# Patient Record
Sex: MALE | Race: BLACK OR AFRICAN AMERICAN | Employment: OTHER | URBAN - METROPOLITAN AREA
[De-identification: names, ages, dates, MRNs, and addresses within clinical notes are randomized per-mention and may not be internally consistent; named-entity substitution may affect disease eponyms.]

---

## 2017-03-22 ENCOUNTER — HOSPITAL ENCOUNTER (OUTPATIENT)
Dept: LAB | Age: 72
Discharge: HOME | End: 2017-03-22

## 2017-03-22 ENCOUNTER — OFFICE VISIT (OUTPATIENT)
Dept: CARDIOLOGY CLINIC | Age: 72
End: 2017-03-22

## 2017-03-22 VITALS
DIASTOLIC BLOOD PRESSURE: 74 MMHG | BODY MASS INDEX: 23.37 KG/M2 | OXYGEN SATURATION: 99 % | HEIGHT: 66 IN | WEIGHT: 145.4 LBS | HEART RATE: 68 BPM | SYSTOLIC BLOOD PRESSURE: 109 MMHG

## 2017-03-22 DIAGNOSIS — I10 ESSENTIAL HYPERTENSION, BENIGN: Primary | ICD-10-CM

## 2017-03-22 DIAGNOSIS — I25.10 ATHEROSCLEROSIS OF NATIVE CORONARY ARTERY OF NATIVE HEART WITHOUT ANGINA PECTORIS: ICD-10-CM

## 2017-03-22 DIAGNOSIS — Z98.61 S/P PTCA (PERCUTANEOUS TRANSLUMINAL CORONARY ANGIOPLASTY): ICD-10-CM

## 2017-03-22 DIAGNOSIS — E78.5 DYSLIPIDEMIA: ICD-10-CM

## 2017-03-22 DIAGNOSIS — M15.9 PRIMARY OSTEOARTHRITIS INVOLVING MULTIPLE JOINTS: ICD-10-CM

## 2017-03-22 PROCEDURE — 99001 SPECIMEN HANDLING PT-LAB: CPT | Performed by: PHYSICIAN ASSISTANT

## 2017-03-22 NOTE — PROGRESS NOTES
[facility]   [address]                                          NEW PATIENT HPI/FOLLOW-UP      NAME:  Carolina Pickard   :   1945   MRN:   [id number]   PCP:  Isela Duque MD           Subjective: The patient is a 66y.o. year old male  who returns for a routine follow-up. Since the last visit, patient reports no new symptoms. Denies change in exercise tolerance, chest pain, edema, medication intolerance, palpitations, shortness of breath, PND/orthopnea wheezing, sputum, syncope, dizziness or light headedness. Doing satisfactorily. Review of Systems  General: Pt denies excessive weight gain or loss. Pt is able to conduct ADL's. Respiratory: Denies shortness of breath, ÁLVAREZ, wheezing or stridor.   Cardiovascular: Denies precordial pain, palpitations, edema or PND  Gastrointestinal: Denies poor appetite, indigestion, abdominal pain or blood in stool  Peripheral vascular: Denies claudication, leg cramps  Neuropsychiatric: Denies paresthesias,tingling,numbness,anxiety,depression,fatigue  Musculoskeletal: Denies pain,tenderness, soreness,swelling      Past Medical History:   Diagnosis Date    CAD (coronary artery disease)     Chest pain, unspecified     Chest pain, unspecified     Chest pain, unspecified     Coronary atherosclerosis of native coronary artery     Coronary atherosclerosis of native coronary artery     Essential hypertension     Essential hypertension, benign     Mixed hyperlipidemia     Other ill-defined conditions(799.89)     HIGH CHOLESTEROL    Other malaise and fatigue     Pure hypercholesterolemia      Patient Active Problem List    Diagnosis Date Noted    Drug therapy 2015    Fatigue 2015    Dizziness 2014    Encephalopathy, unspecified 2013    Intractable pain 2013    Right lumbar radiculopathy 2013    S/P PTCA (percutaneous transluminal coronary angioplasty) 08/03/2013    Chest pain, unspecified 03/05/2013    Essential hypertension, benign 03/05/2013    Dyslipidemia 03/05/2013    Esophageal reflux 03/05/2013    DJD (degenerative joint disease) 07/17/2012    Postsurgical percutaneous transluminal coronary angioplasty status 07/17/2012    Coronary atherosclerosis of native coronary artery 07/17/2012    Carotid sinus syndrome 07/17/2012      Past Surgical History:   Procedure Laterality Date    HX BACK SURGERY      THORACIC SPINE SURGERY  X2    HX GI      COLONOSCOPY    HX HEART CATHETERIZATION      STENT (DR Pebbles Nuñez)    HX LUMBAR LAMINECTOMY  8/2013    HX PTCA       Allergies   Allergen Reactions    Statins-Hmg-Coa Reductase Inhibitors Other (comments)     Body aches      Family History   Problem Relation Age of Onset    Cancer Mother      OVARIAN    Cancer Father      PROSTATE    Anesth Problems Neg Hx       Social History     Social History    Marital status:      Spouse name: N/A    Number of children: N/A    Years of education: N/A     Occupational History    Not on file. Social History Main Topics    Smoking status: Never Smoker    Smokeless tobacco: Never Used    Alcohol use No    Drug use: No    Sexual activity: Yes     Partners: Female     Other Topics Concern    Not on file     Social History Narrative      Current Outpatient Prescriptions   Medication Sig    DOCUSATE CALCIUM (STOOL SOFTENER PO) Take 100 mg by mouth as needed.  lisinopril-hydrochlorothiazide (PRINZIDE, ZESTORETIC) 20-12.5 mg per tablet TAKE ONE TABLET BY MOUTH ONCE DAILY    MULTIVITAMIN (MULTIPLE VITAMIN PO) Take  by mouth daily.  cholestyramine-sucrose (QUESTRAN) 4 gram powder Take 4 g by mouth two (2) times daily (with meals).  red yeast rice extract 600 mg cap Take 600 mg by mouth daily.  aspirin delayed-release 81 mg tablet Take 81 mg by mouth daily.  ibuprofen (MOTRIN) 200 mg tablet Take  by mouth daily as needed.     flaxseed 1,000 mg cap Take 1 Cap by mouth daily.  omega-3 fatty acids-vitamin e (FISH OIL) 1,000 mg Cap Take 1 Cap by mouth as needed. No current facility-administered medications for this visit. I have reviewed the MAs notes, vitals, problem list, allergy list, medical history, family medical, social history and medications. Objective:     Physical Exam:     Vitals:    03/22/17 0928   BP: 109/74   Pulse: 68   SpO2: 99%   Weight: 145 lb 6.4 oz (66 kg)   Height: 5' 6\" (1.676 m)    Body mass index is 23.47 kg/(m^2). General: WDWN elderly male, in no acute distress. Pleasant affect. HEENT: No carotid bruits, no JVD, trach is midline. Heart:  Normal S1/S2 negative S3 or S4. Regular, no murmur, gallop or rub.   Respiratory: Clear bilaterally, no wheezing or rales  Abdomen:   Soft, non-tender, bowel sounds are active.   Extremities:  No edema, normal cap refill, no cyanosis. Neuro: A&Ox3, speech clear, gait stable. Skin: Skin color is normal. No rashes or lesions. No diaphoresis. Vascular: 2+ pulses symmetric in all extremities      Data Review:       Cardiographics:    EKG: NSR, old anterior infarct, early repolarization    Cardiology Labs:    Results for orders placed or performed during the hospital encounter of 08/03/13   EKG, 12 LEAD, INITIAL   Result Value Ref Range    Ventricular Rate 102 BPM    Atrial Rate 102 BPM    P-R Interval 150 ms    QRS Duration 84 ms    Q-T Interval 336 ms    QTC Calculation (Bezet) 437 ms    Calculated P Axis 64 degrees    Calculated R Axis -7 degrees    Calculated T Axis 53 degrees    Diagnosis       Sinus tachycardia  When compared with ECG of 04-AUG-2013 23:45,  Vent.  rate has increased BY  44 BPM  Nonspecific T wave abnormality no longer evident in Inferior leads  Confirmed by Coletta Krabbe, M.D., [facility] ([id number]) on 8/9/2013 12:47:22 PM       Lab Results   Component Value Date/Time    Cholesterol, total 181 07/21/2016 09:23 AM    HDL Cholesterol 45 07/21/2016 09:23 AM    LDL, calculated 114 07/21/2016 09:23 AM    Triglyceride 109 07/21/2016 09:23 AM    CHOL/HDL Ratio 3.2 08/07/2013 04:24 AM       Lab Results   Component Value Date/Time    Sodium 144 03/07/2016 12:00 AM    Potassium 4.0 03/07/2016 12:00 AM    Chloride 104 03/07/2016 12:00 AM    CO2 23 03/07/2016 12:00 AM    Anion gap 7 01/10/2014 05:17 PM    Glucose 99 03/07/2016 12:00 AM    BUN 14 03/07/2016 12:00 AM    Creatinine 1.15 03/07/2016 12:00 AM    BUN/Creatinine ratio 12 03/07/2016 12:00 AM    GFR est AA 73 03/07/2016 12:00 AM    GFR est non-AA 63 03/07/2016 12:00 AM    Calcium 9.2 03/07/2016 12:00 AM    Bilirubin, total 0.4 07/21/2016 09:23 AM    AST (SGOT) 15 07/21/2016 09:23 AM    Alk. phosphatase 58 07/21/2016 09:23 AM    Protein, total 6.6 07/21/2016 09:23 AM    Albumin 4.2 07/21/2016 09:23 AM    Globulin 3.5 01/10/2014 05:17 PM    A-G Ratio 1.9 03/07/2016 12:00 AM    ALT (SGPT) 12 07/21/2016 09:23 AM          Assessment:       ICD-10-CM ICD-9-CM    1. Essential hypertension, benign I10 401.1 AMB POC EKG ROUTINE W/ 12 LEADS, INTER & REP      LIPID PANEL      METABOLIC PANEL, COMPREHENSIVE   2. Chest pain, unspecified R07.9 786.50 AMB POC EKG ROUTINE W/ 12 LEADS, INTER & REP      LIPID PANEL      METABOLIC PANEL, COMPREHENSIVE   3. Atherosclerosis of native coronary artery of native heart with angina pectoris ([facility] [address] I25.119 414.01 LIPID PANEL     650.2 METABOLIC PANEL, COMPREHENSIVE   4. S/P PTCA (percutaneous transluminal coronary angioplasty) Z98.61 V45.82 LIPID PANEL      METABOLIC PANEL, COMPREHENSIVE         Discussion: Patient presents at this time stable from a cardiac perspective. Pleased with present status. Plan: 1. Continue same meds. Lipid profile and labs followed by this office. -- Repeat Lipids and CMP   -- Will follow up any test results by phone and/or f/u here in office if needed. Pal Kaiser       2.Encouraged to exercise to tolerance,  and follow low fat, low cholesterol, low sodium predominantly Plant-based (consider Mediterranean) diet. 3.Follow up: 6 months   -- Call with questions or concerns. I have discussed the diagnosis with the patient and the intended plan as seen in the above orders. The patient has received an after-visit summary and questions were answered concerning future plans. I have discussed any concerning medication side effects and warnings with the patient as well. Patient seen and examined. All pertinent data reviewed. I have reviewed detailed note as outlined by Irwin Henry. Case discussed with Nursing/medical assistant staff and Irwin Henry. Patient cardiac stable. Plans as outlined.     Carlota Valentin PA-C  3/22/2017     LISANDRA Blackwell

## 2017-03-22 NOTE — MR AVS SNAPSHOT
Visit Information Date & Time Provider Department Dept. Phone Encounter #  
 3/22/2017  9:30 AM Julio Pittman MD [facility] [address] [address] [id number] Upcoming Health Maintenance Date Due DTaP/Tdap/Td series (1 - Tdap) 10/4/1964 FOBT Q 1 YEAR AGE 50-75 10/4/1993 ZOSTER VACCINE AGE 60> 10/4/2003 GLAUCOMA SCREENING Q2Y 10/4/2008 MEDICARE YEARLY EXAM 10/4/2008 Pneumococcal 65+ Low/Medium Risk (2 of 2 - PCV13) 8/13/2014 INFLUENZA AGE 9 TO ADULT 8/1/2016 Allergies as of 3/22/2017  Review Complete On: 3/22/2017 By: Chanel Hernandez Severity Noted Reaction Type Reactions Statins-hmg-coa Reductase Inhibitors  07/17/2012    Other (comments) Body aches Current Immunizations  Reviewed on 8/12/2013 Name Date Pneumococcal Polysaccharide (PPSV-23) 8/13/2013  1:04 PM  
  
 Not reviewed this visit You Were Diagnosed With   
  
 Codes Comments Essential hypertension, benign    -  Primary ICD-10-CM: I10 
ICD-9-CM: 251. 1 Chest pain, unspecified     ICD-10-CM: R07.9 ICD-9-CM: 786.50 Atherosclerosis of native coronary artery of native heart with angina pectoris ([facility] [address]     ICD-10-CM: I25.119 ICD-9-CM: 414.01, 413.9 S/P PTCA (percutaneous transluminal coronary angioplasty)     ICD-10-CM: Z98.61 ICD-9-CM: V45.82 Vitals BP Pulse Height(growth percentile) Weight(growth percentile) SpO2 BMI  
 109/74 68 5' 6\" (1.676 m) 145 lb 6.4 oz (66 kg) 99% 23.47 kg/m2 Smoking Status Never Smoker Vitals History BMI and BSA Data Body Mass Index Body Surface Area  
 23.47 kg/m 2 1.75 m 2 Preferred Pharmacy Pharmacy Name Phone [facility] [address] [phone] Your Updated Medication List  
  
   
This list is accurate as of: 3/22/17 10:09 AM.  Always use your most recent med list.  
  
  
  
  
 aspirin delayed-release 81 mg tablet Take 81 mg by mouth daily. cholestyramine-sucrose 4 gram powder Commonly known as:  Reese Form Take 4 g by mouth two (2) times daily (with meals). FISH OIL 1,000 mg Cap Generic drug:  omega-3 fatty acids-vitamin e Take 1 Cap by mouth as needed. flaxseed 1,000 mg Cap Take 1 Cap by mouth daily. ibuprofen 200 mg tablet Commonly known as:  MOTRIN Take  by mouth daily as needed. lisinopril-hydroCHLOROthiazide 20-12.5 mg per tablet Commonly known as:  PRINZIDE, ZESTORETIC  
TAKE ONE TABLET BY MOUTH ONCE DAILY MULTIPLE VITAMIN PO Take  by mouth daily. red yeast rice extract 600 mg Cap Take 600 mg by mouth daily. STOOL SOFTENER PO Take 100 mg by mouth as needed. We Performed the Following AMB POC EKG ROUTINE W/ 12 LEADS, INTER & REP [69025 CPT(R)] LIPID PANEL [90792 CPT(R)] METABOLIC PANEL, COMPREHENSIVE [51296 CPT(R)] Introducing [facility]! [facility] introduces 7-bites patient portal. Now you can access parts of your medical record, email your doctor's office, and request medication refills online. 1. In your internet browser, go to [email or web address] 2. Click on the First Time User? Click Here link in the Sign In box. You will see the New Member Sign Up page. 3. Enter your 7-bites Access Code exactly as it appears below. You will not need to use this code after youve completed the sign-up process. If you do not sign up before the expiration date, you must request a new code. · 7-bites Access Code: [email or web address] Expires: 6/20/2017  9:52 AM 
 
4. Enter the last four digits of your Social Security Number (xxxx) and Date of Birth (mm/dd/yyyy) as indicated and click Submit. You will be taken to the next sign-up page. 5. Create a 7-bites ID.  This will be your 7-bites login ID and cannot be changed, so think of one that is secure and easy to remember. 6. Create a Socialare password. You can change your password at any time. 7. Enter your Password Reset Question and Answer. This can be used at a later time if you forget your password. 8. Enter your e-mail address. You will receive e-mail notification when new information is available in [address] 9. Click Sign Up. You can now view and download portions of your medical record. 10. Click the Download Summary menu link to download a portable copy of your medical information. If you have questions, please visit the Frequently Asked Questions section of the Socialare website. Remember, Socialare is NOT to be used for urgent needs. For medical emergencies, dial 911. Now available from your iPhone and Android! Please provide this summary of care documentation to your next provider. Your primary care clinician is listed as Elvis Gillette. If you have any questions after today's visit, please call [phone].

## 2017-03-23 LAB
ALBUMIN SERPL-MCNC: 4.6 G/DL (ref 3.5–4.8)
ALBUMIN/GLOB SERPL: 1.7 {RATIO} (ref 1.2–2.2)
ALP SERPL-CCNC: 61 IU/L (ref 39–117)
ALT SERPL-CCNC: 23 IU/L (ref 0–44)
AST SERPL-CCNC: 30 IU/L (ref 0–40)
BILIRUB SERPL-MCNC: 0.6 MG/DL (ref 0–1.2)
BUN SERPL-MCNC: 13 MG/DL (ref 8–27)
BUN/CREAT SERPL: 11 (ref 10–22)
CALCIUM SERPL-MCNC: 9.8 MG/DL (ref 8.6–10.2)
CHLORIDE SERPL-SCNC: 99 MMOL/L (ref 96–106)
CHOLEST SERPL-MCNC: 233 MG/DL (ref 100–199)
CO2 SERPL-SCNC: 25 MMOL/L (ref 18–29)
CREAT SERPL-MCNC: 1.2 MG/DL (ref 0.76–1.27)
GLOBULIN SER CALC-MCNC: 2.7 G/DL (ref 1.5–4.5)
GLUCOSE SERPL-MCNC: 89 MG/DL (ref 65–99)
HDLC SERPL-MCNC: 69 MG/DL
LDLC SERPL CALC-MCNC: 149 MG/DL (ref 0–99)
POTASSIUM SERPL-SCNC: 4.2 MMOL/L (ref 3.5–5.2)
PROT SERPL-MCNC: 7.3 G/DL (ref 6–8.5)
SODIUM SERPL-SCNC: 142 MMOL/L (ref 134–144)
TRIGL SERPL-MCNC: 74 MG/DL (ref 0–149)
VLDLC SERPL CALC-MCNC: 15 MG/DL (ref 5–40)

## 2017-03-28 DIAGNOSIS — E78.5 DYSLIPIDEMIA: Primary | ICD-10-CM

## 2017-03-28 RX ORDER — FENOFIBRATE 48 MG/1
48 TABLET, COATED ORAL DAILY
Qty: 30 TAB | Refills: 3 | Status: SHIPPED | OUTPATIENT
Start: 2017-03-28 | End: 2017-12-26 | Stop reason: SDUPTHER

## 2017-04-04 DIAGNOSIS — E78.5 DYSLIPIDEMIA: ICD-10-CM

## 2017-04-04 RX ORDER — CHOLESTYRAMINE 4 G/9G
4 POWDER, FOR SUSPENSION ORAL 2 TIMES DAILY WITH MEALS
Qty: 30 G | Refills: 11 | Status: SHIPPED | OUTPATIENT
Start: 2017-04-04 | End: 2018-03-20 | Stop reason: SDUPTHER

## 2017-04-19 ENCOUNTER — TELEPHONE (OUTPATIENT)
Dept: CARDIOLOGY CLINIC | Age: 72
End: 2017-04-19

## 2017-04-19 RX ORDER — LISINOPRIL AND HYDROCHLOROTHIAZIDE 12.5; 2 MG/1; MG/1
TABLET ORAL
Qty: 90 TAB | Refills: 1 | Status: SHIPPED | OUTPATIENT
Start: 2017-04-19 | End: 2017-12-26 | Stop reason: SDUPTHER

## 2017-04-19 NOTE — TELEPHONE ENCOUNTER
Outgoing call to patient to confirm that he's currently taking Triicor 45 mg daily per. Dr. Danny Lagunas. Patient stated \"that he's currently taking medication.

## 2017-09-20 ENCOUNTER — OFFICE VISIT (OUTPATIENT)
Dept: CARDIOLOGY CLINIC | Age: 72
End: 2017-09-20

## 2017-09-20 VITALS
HEIGHT: 66 IN | BODY MASS INDEX: 23.43 KG/M2 | WEIGHT: 145.8 LBS | HEART RATE: 59 BPM | DIASTOLIC BLOOD PRESSURE: 70 MMHG | OXYGEN SATURATION: 95 % | SYSTOLIC BLOOD PRESSURE: 108 MMHG

## 2017-09-20 DIAGNOSIS — E78.5 DYSLIPIDEMIA: ICD-10-CM

## 2017-09-20 DIAGNOSIS — I25.10 ATHEROSCLEROSIS OF NATIVE CORONARY ARTERY OF NATIVE HEART WITHOUT ANGINA PECTORIS: ICD-10-CM

## 2017-09-20 DIAGNOSIS — I10 ESSENTIAL HYPERTENSION, BENIGN: Primary | ICD-10-CM

## 2017-09-20 DIAGNOSIS — Z98.61 S/P PTCA (PERCUTANEOUS TRANSLUMINAL CORONARY ANGIOPLASTY): ICD-10-CM

## 2017-09-20 DIAGNOSIS — R07.9 CHEST PAIN, UNSPECIFIED: ICD-10-CM

## 2017-09-20 RX ORDER — CHOLESTYRAMINE 4 G/9G
POWDER, FOR SUSPENSION ORAL
COMMUNITY
Start: 2017-07-24 | End: 2017-09-20 | Stop reason: SDUPTHER

## 2017-09-20 NOTE — MR AVS SNAPSHOT
Visit Information Date & Time Provider Department Dept. Phone Encounter #  
 9/20/2017 10:35 AM MD [facility] [address] [place] [address] [phone] [id number] Your Appointments 3/20/2018 10:20 AM  
ESTABLISHED PATIENT with MD [facility]) Appt Note: 6 MO. F/U  
 [address] [place]  
[phone] [address] Upcoming Health Maintenance Date Due DTaP/Tdap/Td series (1 - Tdap) 10/4/1964 FOBT Q 1 YEAR AGE 50-75 10/4/1993 ZOSTER VACCINE AGE 60> 8/4/2003 GLAUCOMA SCREENING Q2Y 10/4/2008 MEDICARE YEARLY EXAM 10/4/2008 Pneumococcal 65+ Low/Medium Risk (2 of 2 - PCV13) 8/13/2014 INFLUENZA AGE 9 TO ADULT 8/1/2017 Allergies as of 9/20/2017  Review Complete On: 3/22/2017 By: Grecia Schneider MD  
  
 Severity Noted Reaction Type Reactions Statins-hmg-coa Reductase Inhibitors  07/17/2012    Other (comments) Body aches Current Immunizations  Reviewed on 8/12/2013 Name Date Pneumococcal Polysaccharide (PPSV-23) 8/13/2013  1:04 PM  
  
 Not reviewed this visit You Were Diagnosed With   
  
 Codes Comments Essential hypertension, benign    -  Primary ICD-10-CM: I10 
ICD-9-CM: 563. 1 Chest pain, unspecified     ICD-10-CM: R07.9 ICD-9-CM: 786.50 Atherosclerosis of native coronary artery of native heart without angina pectoris     ICD-10-CM: I25.10 ICD-9-CM: 414.01 Dyslipidemia     ICD-10-CM: E78.5 ICD-9-CM: 272.4 S/P PTCA (percutaneous transluminal coronary angioplasty)     ICD-10-CM: Z98.61 ICD-9-CM: V45.82 Vitals BP Pulse Height(growth percentile) Weight(growth percentile) SpO2 BMI  
 108/70 (!) 59 5' 6\" (1.676 m) 145 lb 12.8 oz (66.1 kg) 95% 23.53 kg/m2 Smoking Status Never Smoker Vitals History BMI and BSA Data Body Mass Index Body Surface Area  
 23.53 kg/m 2 1.75 m 2 Preferred Pharmacy Pharmacy Name Phone [facility] [address] , [address] [place] [address] [phone] Your Updated Medication List  
  
   
This list is accurate as of: 9/20/17 12:28 PM.  Always use your most recent med list.  
  
  
  
  
 aspirin delayed-release 81 mg tablet Take 81 mg by mouth daily. cholestyramine-sucrose 4 gram powder Commonly known as:  Addis Teresa Take 4 g by mouth two (2) times daily (with meals). fenofibrate nanocrystallized 48 mg tablet Commonly known as:  [employer] Take 1 Tab by mouth daily. Indications: hyperlipidemia  
  
 flaxseed 1,000 mg Cap Take 1 Cap by mouth daily. ibuprofen 200 mg tablet Commonly known as:  MOTRIN Take  by mouth daily as needed. lisinopril-hydroCHLOROthiazide 20-12.5 mg per tablet Commonly known as:  PRINZIDE, ZESTORETIC  
TAKE ONE TABLET BY MOUTH ONCE DAILY MULTIPLE VITAMIN PO Take  by mouth daily. red yeast rice extract 600 mg Cap Take 600 mg by mouth daily. STOOL SOFTENER PO Take 100 mg by mouth as needed. We Performed the Following AMB POC EKG ROUTINE W/ 12 LEADS, INTER & REP [63847 CPT(R)] Introducing [facility]! Jessica Carroll introduces e[employer]ao[employer] patient portal. Now you can access parts of your medical record, email your doctor's office, and request medication refills online. 1. In your internet browser, go to [email or web address] 2. Click on the First Time User? Click Here link in the Sign In box. You will see the New Member Sign Up page. 3. Enter your eBaoTech Access Code exactly as it appears below. You will not need to use this code after youve completed the sign-up process. If you do not sign up before the expiration date, you must request a new code. · [employer]ao[employer] Access Code: [email or web address] Expires: 12/19/2017 12:28 PM 
 
4. Enter the last four digits of your Social Security Number (xxxx) and Date of Birth (mm/dd/yyyy) as indicated and click Submit. You will be taken to the next sign-up page. 5. Create a ioSemantics ID. This will be your ioSemantics login ID and cannot be changed, so think of one that is secure and easy to remember. 6. Create a ioSemantics password. You can change your password at any time. 7. Enter your Password Reset Question and Answer. This can be used at a later time if you forget your password. 8. Enter your e-mail address. You will receive e-mail notification when new information is available in [address] 9. Click Sign Up. You can now view and download portions of your medical record. 10. Click the Download Summary menu link to download a portable copy of your medical information. If you have questions, please visit the Frequently Asked Questions section of the ioSemantics website. Remember, ioSemantics is NOT to be used for urgent needs. For medical emergencies, dial 911. Now available from your iPhone and Android! Please provide this summary of care documentation to your next provider. Your primary care clinician is listed as Benoit Gross. If you have any questions after today's visit, please call [phone].

## 2017-09-20 NOTE — PROGRESS NOTES
[facility]   [address] [facility], [address] [place] [address]                                          NEW PATIENT HPI/FOLLOW-UP      NAME:  Laurann Harada.   :   1945   MRN:   [id number]   PCP:  Jene Cogan, MD           Subjective: The patient is a 66y.o. year old male h/o CAD, s/p angioplasty, carotid sinus syndrome, htn, dyslipidemia with statin intolerance, DJD, who returns for a routine follow-up. Since the last visit, patient reports no new symptoms. Denies change in exercise tolerance, chest pain, edema, medication intolerance, palpitations, shortness of breath, PND/orthopnea, wheezing, sputum, syncope, dizziness or light headedness. Doing satisfactorily. Review of Systems  General: Pt denies excessive weight gain or loss. Pt is able to conduct ADL's. Respiratory: Denies shortness of breath, ÁLVAREZ, wheezing or stridor.   Cardiovascular: Denies precordial pain, palpitations, edema or PND  Gastrointestinal: Denies poor appetite, indigestion, abdominal pain or blood in stool  Peripheral vascular: Denies claudication, leg cramps  Neuropsychiatric: Denies paresthesias,tingling,numbness,anxiety,depression,fatigue  Musculoskeletal: Denies pain,tenderness, soreness,swelling      Past Medical History:   Diagnosis Date    CAD (coronary artery disease)     Chest pain, unspecified     Chest pain, unspecified     Chest pain, unspecified     Coronary atherosclerosis of native coronary artery     Coronary atherosclerosis of native coronary artery     Essential hypertension     Essential hypertension, benign     Mixed hyperlipidemia     Other ill-defined conditions     HIGH CHOLESTEROL    Other malaise and fatigue     Pure hypercholesterolemia      Patient Active Problem List    Diagnosis Date Noted    Drug therapy 2015    Fatigue 2015    Dizziness 2014    Encephalopathy, unspecified 2013    Intractable pain 2013    Right lumbar radiculopathy 08/03/2013    S/P PTCA (percutaneous transluminal coronary angioplasty) 08/03/2013    Chest pain, unspecified 03/05/2013    Essential hypertension, benign 03/05/2013    Dyslipidemia 03/05/2013    Esophageal reflux 03/05/2013    DJD (degenerative joint disease) 07/17/2012    Postsurgical percutaneous transluminal coronary angioplasty status 07/17/2012    Coronary atherosclerosis of native coronary artery 07/17/2012    Carotid sinus syndrome 07/17/2012      Past Surgical History:   Procedure Laterality Date    HX BACK SURGERY      THORACIC SPINE SURGERY  X2    HX GI      COLONOSCOPY    HX HEART CATHETERIZATION      STENT (DR Alpa Hernandez)    HX LUMBAR LAMINECTOMY  8/2013    HX PTCA       Allergies   Allergen Reactions    Statins-Hmg-Coa Reductase Inhibitors Other (comments)     Body aches      Family History   Problem Relation Age of Onset    Cancer Mother      OVARIAN    Cancer Father      PROSTATE    Anesth Problems Neg Hx       Social History     Social History    Marital status:      Spouse name: N/A    Number of children: N/A    Years of education: N/A     Occupational History    Not on file. Social History Main Topics    Smoking status: Never Smoker    Smokeless tobacco: Never Used    Alcohol use No    Drug use: No    Sexual activity: Yes     Partners: Female     Other Topics Concern    Not on file     Social History Narrative      Current Outpatient Prescriptions   Medication Sig    lisinopril-hydroCHLOROthiazide (PRINZIDE, ZESTORETIC) 20-12.5 mg per tablet TAKE ONE TABLET BY MOUTH ONCE DAILY    cholestyramine-sucrose (QUESTRAN) 4 gram powder Take 4 g by mouth two (2) times daily (with meals).  fenofibrate nanocrystallized (TRICOR) 48 mg tablet Take 1 Tab by mouth daily. Indications: hyperlipidemia    DOCUSATE CALCIUM (STOOL SOFTENER PO) Take 100 mg by mouth as needed.  MULTIVITAMIN (MULTIPLE VITAMIN PO) Take  by mouth daily.     red yeast rice extract 600 mg cap Take 600 mg by mouth daily.  aspirin delayed-release 81 mg tablet Take 81 mg by mouth daily.  ibuprofen (MOTRIN) 200 mg tablet Take  by mouth daily as needed.  flaxseed 1,000 mg cap Take 1 Cap by mouth daily. No current facility-administered medications for this visit. I have reviewed the MAs notes, vitals, problem list, allergy list, medical history, family medical, social history and medications. Objective:     Physical Exam:     Vitals:    09/20/17 1041   BP: 108/70   Pulse: (!) 59   SpO2: 95%   Weight: 145 lb 12.8 oz (66.1 kg)   Height: 5' 6\" (1.676 m)    Body mass index is 23.53 kg/(m^2). General: WDWN adult male, in no acute distress. Pleasant affect. HEENT: No carotid bruits, no JVD, trach is midline. Heart:  Normal S1/S2 negative S3 or S4. Regular but slow, no murmur, gallop or rub.   Respiratory: Clear bilaterally, no wheezing or rales  Abdomen:   Soft, non-tender, bowel sounds are active.   Extremities:  No edema, normal cap refill, no cyanosis. Neuro: A&Ox3, speech clear, gait stable. Skin: Skin color is normal. No rashes or lesions. No diaphoresis. Vascular: 2+ pulses symmetric in all extremities      Data Review:       Cardiographics:    EKG: Sinus  Bradycardia. Normal DE. Normal axis. Inferolateral ST-elevation -possible repolarization variant. ST segment consistent with EKG 3/22/17    Cardiology Labs:    Results for orders placed or performed during the hospital encounter of 08/03/13   EKG, 12 LEAD, INITIAL   Result Value Ref Range    Ventricular Rate 102 BPM    Atrial Rate 102 BPM    P-R Interval 150 ms    QRS Duration 84 ms    Q-T Interval 336 ms    QTC Calculation (Bezet) 437 ms    Calculated P Axis 64 degrees    Calculated R Axis -7 degrees    Calculated T Axis 53 degrees    Diagnosis       Sinus tachycardia  When compared with ECG of 04-AUG-2013 23:45,  Vent.  rate has increased BY  44 BPM  Nonspecific T wave abnormality no longer evident in Inferior leads  Confirmed by Benji Thomas M.D., Stevo Pearson ([id number]) on 8/9/2013 12:47:22 PM       Lab Results   Component Value Date/Time    Cholesterol, total 233 03/22/2017 12:00 AM    HDL Cholesterol 69 03/22/2017 12:00 AM    LDL, calculated 149 03/22/2017 12:00 AM    Triglyceride 74 03/22/2017 12:00 AM    CHOL/HDL Ratio 3.2 08/07/2013 04:24 AM       Lab Results   Component Value Date/Time    Sodium 142 03/22/2017 12:00 AM    Potassium 4.2 03/22/2017 12:00 AM    Chloride 99 03/22/2017 12:00 AM    CO2 25 03/22/2017 12:00 AM    Anion gap 7 01/10/2014 05:17 PM    Glucose 89 03/22/2017 12:00 AM    BUN 13 03/22/2017 12:00 AM    Creatinine 1.20 03/22/2017 12:00 AM    BUN/Creatinine ratio 11 03/22/2017 12:00 AM    GFR est AA 69 03/22/2017 12:00 AM    GFR est non-AA 60 03/22/2017 12:00 AM    Calcium 9.8 03/22/2017 12:00 AM    Bilirubin, total 0.6 03/22/2017 12:00 AM    AST (SGOT) 30 03/22/2017 12:00 AM    Alk. phosphatase 61 03/22/2017 12:00 AM    Protein, total 7.3 03/22/2017 12:00 AM    Albumin 4.6 03/22/2017 12:00 AM    Globulin 3.5 01/10/2014 05:17 PM    A-G Ratio 1.7 03/22/2017 12:00 AM    ALT (SGPT) 23 03/22/2017 12:00 AM          Assessment:       ICD-10-CM ICD-9-CM    1. Essential hypertension, benign I10 401.1 AMB POC EKG ROUTINE W/ 12 LEADS, INTER & REP   2. Chest pain, unspecified R07.9 786.50 AMB POC EKG ROUTINE W/ 12 LEADS, INTER & REP   3. Atherosclerosis of native coronary artery of native heart without angina pectoris I25.10 414.01    4. Dyslipidemia E78.5 272.4    5. S/P PTCA (percutaneous transluminal coronary angioplasty) Z98.61 V45.82          Discussion: Patient presents at this time stable from a cardiac perspective. Lipids not at goal. Pleased with present status. Plan: 1. Continue same meds. Lipid profile and labs followed by this office. -- Redraw in 6 months    2. Encouraged to exercise to tolerance, lose weight, and follow low fat, low cholesterol, low sodium predominantly Plant-based (consider Mediterranean) diet. 3.Follow up: 6 months    I have discussed the diagnosis with the patient and the intended plan as seen in the above orders. The patient has received an after-visit summary and questions were answered concerning future plans. I have discussed any concerning medication side effects and warnings with the patient as well. Patient seen and examined. All pertinent data reviewed. I have reviewed detailed note as outlined by Royal Zuniga. Case discussed with Nursing/medical assistant staff and Royal Zuniga. Patient presents with lipids not at goal. Consider Ropatha injectable. Patient will \"think about it\" and call with decision but concerned about cost. Plans as outlined.       Elidia Thakur PA-C  9/20/2017     LISANDRA Page

## 2017-09-29 ENCOUNTER — OFFICE VISIT (OUTPATIENT)
Dept: INTERNAL MEDICINE CLINIC | Age: 72
End: 2017-09-29

## 2017-09-29 VITALS
SYSTOLIC BLOOD PRESSURE: 111 MMHG | HEART RATE: 71 BPM | OXYGEN SATURATION: 98 % | HEIGHT: 66 IN | WEIGHT: 146.2 LBS | DIASTOLIC BLOOD PRESSURE: 70 MMHG | TEMPERATURE: 98 F | BODY MASS INDEX: 23.5 KG/M2 | RESPIRATION RATE: 18 BRPM

## 2017-09-29 DIAGNOSIS — R79.9 ABNORMAL FINDING OF BLOOD CHEMISTRY: ICD-10-CM

## 2017-09-29 DIAGNOSIS — R31.1 BENIGN ESSENTIAL MICROSCOPIC HEMATURIA: ICD-10-CM

## 2017-09-29 DIAGNOSIS — Z12.11 SCREEN FOR COLON CANCER: ICD-10-CM

## 2017-09-29 DIAGNOSIS — Z98.61 S/P PTCA (PERCUTANEOUS TRANSLUMINAL CORONARY ANGIOPLASTY): ICD-10-CM

## 2017-09-29 DIAGNOSIS — I10 ESSENTIAL HYPERTENSION, BENIGN: Primary | ICD-10-CM

## 2017-09-29 DIAGNOSIS — Z98.61 POSTSURGICAL PERCUTANEOUS TRANSLUMINAL CORONARY ANGIOPLASTY STATUS: ICD-10-CM

## 2017-09-29 DIAGNOSIS — M15.9 PRIMARY OSTEOARTHRITIS INVOLVING MULTIPLE JOINTS: ICD-10-CM

## 2017-09-29 NOTE — PROGRESS NOTES
1. Have you been to the ER, urgent care clinic since your last visit? Hospitalized since your last visit? No    2. Have you seen or consulted any other health care providers outside of the [address] [place] [address] since your last visit? Include any pap smears or colon screening.  No    Wants to have colonoscopy

## 2017-09-29 NOTE — PROGRESS NOTES
[facility]  Roger Castillo MD, [address] [address]  Phone:  [phone]  Fax: [phone]    Chief Complaint   Patient presents with    Hypertension       SUBJECTIVE:    Osmin Vargas is a 66 y.o. male Patient returns today ambulatory, alert, has the capacity to give an accurate history. We recall that we last saw him when hospitalized under the services of Britt Johnson. He had been admitted on 08/03/13, discharged on 08/13/13 with intractable pain due to lumbar spinal stenosis and spondylosis and underwent a lumbar laminectomy with instrumentation by Dr. Meredith Berrios on 08/03/13. More recently he was seen by Sonali Ledezma last week with history of coronary artery disease, status post angioplasty,  syndrome, hypertension, dyslipidemia with statin intolerance, DJD. He was advised to continue his current medications. He was encouraged to exercise to tolerance, use a plant based or Mediterranean diet and see him again in six months. Patient received a letter from the gastroenterologist requesting a repeat colonoscopy. He comes in today for evaluation in preparation for that. Current Outpatient Prescriptions   Medication Sig Dispense Refill    lisinopril-hydroCHLOROthiazide (PRINZIDE, ZESTORETIC) 20-12.5 mg per tablet TAKE ONE TABLET BY MOUTH ONCE DAILY 90 Tab 1    cholestyramine-sucrose (QUESTRAN) 4 gram powder Take 4 g by mouth two (2) times daily (with meals). 30 g 11    fenofibrate nanocrystallized (TRICOR) 48 mg tablet Take 1 Tab by mouth daily. Indications: hyperlipidemia 30 Tab 3    DOCUSATE CALCIUM (STOOL SOFTENER PO) Take 100 mg by mouth as needed.  MULTIVITAMIN (MULTIPLE VITAMIN PO) Take  by mouth daily.  red yeast rice extract 600 mg cap Take 600 mg by mouth daily.  aspirin delayed-release 81 mg tablet Take 81 mg by mouth daily.  ibuprofen (MOTRIN) 200 mg tablet Take  by mouth daily as needed.  flaxseed 1,000 mg cap Take 1 Cap by mouth daily.        Past Medical History:   Diagnosis Date    CAD (coronary artery disease)     Chest pain, unspecified     Chest pain, unspecified     Chest pain, unspecified     Coronary atherosclerosis of native coronary artery 07/17/2012    stent    Coronary atherosclerosis of native coronary artery     Essential hypertension     Essential hypertension, benign     Lumbar spinal stenosis 08/03/2013    SPINE LUMBAR LAMINECTOMY WITH INSTRUMENTATION - p tamiko    Mixed hyperlipidemia     Other ill-defined conditions     HIGH CHOLESTEROL    Other malaise and fatigue     Pure hypercholesterolemia      Past Surgical History:   Procedure Laterality Date    HX BACK SURGERY      THORACIC SPINE SURGERY  X2    HX GI      COLONOSCOPY    HX HEART CATHETERIZATION      STENT (DR Froylan Angelucci)    HX LUMBAR LAMINECTOMY  8/2013    HX PTCA       Allergies   Allergen Reactions    Statins-Hmg-Coa Reductase Inhibitors Other (comments)     Body aches       REVIEW OF SYSTEMS:  General: negative for - chills or fever  ENT: negative for - headaches, nasal congestion or tinnitus  Respiratory: negative for - cough, hemoptysis, shortness of breath or wheezing  Cardiovascular : negative for - chest pain, edema, palpitations or shortness of breath  Gastrointestinal: negative for - abdominal pain, blood in stools, heartburn or nausea/vomiting  Genito-Urinary: no dysuria, trouble voiding, or hematuria  Musculoskeletal: negative for - gait disturbance, joint pain, joint stiffness or joint swelling  Neurological: no TIA or stroke symptoms  Hematologic: no bruises, no bleeding, no swollen glands  Integument: no lumps, mole changes, nail changes or rash  Endocrine:no malaise/lethargy or unexpected weight changes      Social History     Social History    Marital status:      Spouse name: N/A    Number of children: N/A    Years of education: N/A     Social History Main Topics    Smoking status: Never Smoker    Smokeless tobacco: Never Used    Alcohol use No    Drug use: No    Sexual activity: Yes     Partners: Female     Other Topics Concern    None     Social History Narrative    Medical History: BPHEDelevated PSACholesterolHypertensionMRI 2011 leftw bonnie convexity along the mid to low er lumbar spine    and a rightw bonnie convexity along the thoracolumbar junction. 2 the patient has multilevel degenerative disc disease. 3 there is moderate spinal    stenosis at L3-4 and L4-5 levels. In addition there is facet arthropathy and asymmetrical disc protrusions at L3-4 and L4-5 levels. There is a    narrow ing of the lateral recesses w ith the potential for extrinsic compression upon the origins of the L4 and the L5 nerve roots respectivelyCT    abdomen 2003 malrotation left kidney otherw ise normalCarotid sinus syndrome    Surgical History: colonoscopy by Josefina Morin M.D. [id number] quiles md [id number] S1 laminectomy 2013    Hospitalization/Major Diagnostic Procedure: prolonged syncop'e 06    Family History: Mother: , cancerFather: , unknow nSister(s): aliveBrother(s): aliveDaughter(s): aliveGrandmother:    , natural causesGrandfather: , natural causes1 sister(s) - healthy. 3 son(s) , 3 daughter(s) . Social History: Alcohol Use Patient does not use alcohol. Smoking Status Patient is a never smoker. Caffeine: per day, coffee, soda, tea. Marital Status: . Lives w ith: alone. Children: yes, children. 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      Family History   Problem Relation Age of Onset    Cancer Mother      OVARIAN    Cancer Father      PROSTATE    Anesth Problems Neg Hx        OBJECTIVE:     Visit Vitals    /70    Pulse 71    Temp 98 °F (36.7 °C) (Oral)    Resp 18    Ht 5' 6\" (1.676 m)    Wt 146 lb 3.2 oz (66.3 kg)    SpO2 98%    BMI 23.6 kg/m2 CONSTITUTIONAL: well , well nourished, appears age appropriate  EYES: perrla, eom intact  ENMT:moist mucous membranes, pharynx clear  NECK: supple. Thyroid normal  RESPIRATORY: Chest: clear bilaterally  CARDIOVASCULAR: Heart: regular rate and rhythm  GASTROINTESTINAL: Abdomen: soft, bowel sounds active  HEMATOLOGIC: no pathological lymph nodes palpated  MUSCULOSKELETAL: Extremities: no edema, pulse 1+   INTEGUMENT: No unusual rashes or suspicious skin lesions noted. Nails appear normal.  NEUROLOGIC: non-focal exam   MENTAL STATUS: alert and oriented, appropriate affect     No visits with results within 3 Month(s) from this visit. Latest known visit with results is:    Office Visit on 03/22/2017   Component Date Value Ref Range Status    Cholesterol, total 03/22/2017 233* 100 - 199 mg/dL Final    Triglyceride 03/22/2017 74  0 - 149 mg/dL Final    HDL Cholesterol 03/22/2017 69  >39 mg/dL Final    VLDL, calculated 03/22/2017 15  5 - 40 mg/dL Final    LDL, calculated 03/22/2017 149* 0 - 99 mg/dL Final    Glucose 03/22/2017 89  65 - 99 mg/dL Final    BUN 03/22/2017 13  8 - 27 mg/dL Final    Creatinine 03/22/2017 1.20  0.76 - 1.27 mg/dL Final    GFR est non-AA 03/22/2017 60  >59 mL/min/1.73 Final    GFR est AA 03/22/2017 69  >59 mL/min/1.73 Final    BUN/Creatinine ratio 03/22/2017 11  10 - 22 Final    Sodium 03/22/2017 142  134 - 144 mmol/L Final    Potassium 03/22/2017 4.2  3.5 - 5.2 mmol/L Final    Chloride 03/22/2017 99  96 - 106 mmol/L Final    CO2 03/22/2017 25  18 - 29 mmol/L Final    Calcium 03/22/2017 9.8  8.6 - 10.2 mg/dL Final    Protein, total 03/22/2017 7.3  6.0 - 8.5 g/dL Final    Albumin 03/22/2017 4.6  3.5 - 4.8 g/dL Final    GLOBULIN, TOTAL 03/22/2017 2.7  1.5 - 4.5 g/dL Final    A-G Ratio 03/22/2017 1.7  1.2 - 2.2 Final                  **Please note reference interval change**    Bilirubin, total 03/22/2017 0.6  0.0 - 1.2 mg/dL Final    Alk.  phosphatase 03/22/2017 61  39 - 117 IU/L Final    AST (SGOT) 03/22/2017 30  0 - 40 IU/L Final    ALT (SGPT) 03/22/2017 23  0 - 44 IU/L Final       ASSESSMENT:   1. Essential hypertension, benign    2. Postsurgical percutaneous transluminal coronary angioplasty status    3. Primary osteoarthritis involving multiple joints    4. S/P PTCA (percutaneous transluminal coronary angioplasty)    5. Benign essential microscopic hematuria     6. Abnormal finding of blood chemistry     7. Screen for colon cancer      Patient's medical status is stable. Dr. Fiorella Cage is working on his cholesterol. Blood pressure control is at goal.    His BMI reflects ideal body weight. We suggest he not lose any further weight. He had a colonoscopy by Dr. Annette Mccartney in 2001, had another colonoscopy by a GI group, he doesn't recall the physician. Patient will be going to cardiologist on [address] [place] [address] for cardiac clearance and subsequently will be seen by the gastroenterologist.  He will give us both names for referrals. He'll return to see us in six months to a year, sooner if he has any problems. Appropriate laboratory studies were requested. PLAN:  .  Orders Placed This Encounter    URINALYSIS W/ RFLX MICROSCOPIC    CBC WITH AUTOMATED DIFF    METABOLIC PANEL, COMPREHENSIVE    LIPID PANEL    PROSTATE SPECIFIC AG    HEMOGLOBIN A1C WITH EAG    REFERRAL TO GASTROENTEROLOGY       Follow-up Disposition:  Return in about 6 months (around 3/29/2018). ATTENTION:   This medical record was transcribed using an electronic medical records system. Although proofread, it may and can contain electronic and spelling errors. Other human spelling and other errors may be present. Corrections may be executed at a later time. Please feel free to contact us for any clarifications as needed.

## 2017-09-29 NOTE — MR AVS SNAPSHOT
Visit Information Date & Time Provider Department Dept. Phone Encounter #  
 9/29/2017 11:00 AM Chente Lumakaylajenna Abmaia [facility] [phone] [id number] Follow-up Instructions Return in about 6 months (around 3/29/2018). Follow-up and Disposition History Your Appointments 3/20/2018 10:20 AM  
ESTABLISHED PATIENT with Lupe Roberts MD  
[facility]) Appt Note: 6 MO. F/U  
 [address]  
[phone] [address] Upcoming Health Maintenance Date Due DTaP/Tdap/Td series (1 - Tdap) 10/4/1964 FOBT Q 1 YEAR AGE 50-75 10/4/1993 ZOSTER VACCINE AGE 60> 8/4/2003 GLAUCOMA SCREENING Q2Y 10/4/2008 MEDICARE YEARLY EXAM 10/4/2008 Pneumococcal 65+ Low/Medium Risk (2 of 2 - PCV13) 8/13/2014 INFLUENZA AGE 9 TO ADULT 8/1/2017 Allergies as of 9/29/2017  Review Complete On: 9/29/2017 By: Cody Soto MD  
  
 Severity Noted Reaction Type Reactions Statins-hmg-coa Reductase Inhibitors  07/17/2012    Other (comments) Body aches Current Immunizations  Reviewed on 8/12/2013 Name Date Pneumococcal Polysaccharide (PPSV-23) 8/13/2013  1:04 PM  
  
 Not reviewed this visit You Were Diagnosed With   
  
 Codes Comments Essential hypertension, benign    -  Primary ICD-10-CM: I10 
ICD-9-CM: 401.1 Postsurgical percutaneous transluminal coronary angioplasty status     ICD-10-CM: Z98.61 ICD-9-CM: V45.82 Primary osteoarthritis involving multiple joints     ICD-10-CM: M15.0 ICD-9-CM: 715.09 S/P PTCA (percutaneous transluminal coronary angioplasty)     ICD-10-CM: Z98.61 ICD-9-CM: V45.82 Benign essential microscopic hematuria     ICD-10-CM: R31.1 ICD-9-CM: 599.72 Abnormal finding of blood chemistry     ICD-10-CM: R79.9 ICD-9-CM: 790.6 Screen for colon cancer     ICD-10-CM: Z12.11 ICD-9-CM: V76.51 Vitals BP Pulse Temp Resp Height(growth percentile) Weight(growth percentile) 111/70 71 98 °F (36.7 °C) (Oral) 18 5' 6\" (1.676 m) 146 lb 3.2 oz (66.3 kg) SpO2 BMI Smoking Status 98% 23.6 kg/m2 Never Smoker BMI and BSA Data Body Mass Index Body Surface Area  
 23.6 kg/m 2 1.76 m 2 Preferred Pharmacy Pharmacy Name Phone [facility] [address] , [address] [place] [address] [phone] Your Updated Medication List  
  
   
This list is accurate as of: 9/29/17  1:13 PM.  Always use your most recent med list.  
  
  
  
  
 aspirin delayed-release 81 mg tablet Take 81 mg by mouth daily. cholestyramine-sucrose 4 gram powder Commonly known as:  Juan Curet Take 4 g by mouth two (2) times daily (with meals). fenofibrate nanocrystallized 48 mg tablet Commonly known as:  [employer] Take 1 Tab by mouth daily. Indications: hyperlipidemia  
  
 flaxseed 1,000 mg Cap Take 1 Cap by mouth daily. ibuprofen 200 mg tablet Commonly known as:  MOTRIN Take  by mouth daily as needed. lisinopril-hydroCHLOROthiazide 20-12.5 mg per tablet Commonly known as:  PRINZIDE, ZESTORETIC  
TAKE ONE TABLET BY MOUTH ONCE DAILY MULTIPLE VITAMIN PO Take  by mouth daily. red yeast rice extract 600 mg Cap Take 600 mg by mouth daily. STOOL SOFTENER PO Take 100 mg by mouth as needed. We Performed the Following CBC WITH AUTOMATED DIFF [61629 CPT(R)] HEMOGLOBIN A1C WITH EAG [84339 CPT(R)] LIPID PANEL [86901 CPT(R)] METABOLIC PANEL, COMPREHENSIVE [82698 CPT(R)] TX COLLECTION VENOUS BLOOD,VENIPUNCTURE E3759069 CPT(R)] PSA, DIAGNOSTIC (PROSTATE SPECIFIC AG) B488338 CPT(R)] REFERRAL TO GASTROENTEROLOGY [CLT79 Custom] Comments:  
 Please evaluate patient for colonoscopy. URINALYSIS W/ RFLX MICROSCOPIC [60414 CPT(R)] Follow-up Instructions Return in about 6 months (around 3/29/2018). Referral Information Referral ID Referred By Referred To  
  
 [id number] Flex GLOVER Not Available Visits Status Start Date End Date 1 New Request 9/29/17 9/29/18 If your referral has a status of pending review or denied, additional information will be sent to support the outcome of this decision. Introducing \[facility]\"" & HEALTH SERVICES! Raiza Joaquin introduces [employer] patient portal. Now you can access parts of your medical record, email your doctor's office, and request medication refills online. 1. In your internet browser, go to [email or web address] 2. Click on the First Time User? Click Here link in the Sign In box. You will see the New Member Sign Up page. 3. Enter your YES[employer]TAP Access Code exactly as it appears below. You will not need to use this code after youve completed the sign-up process. If you do not sign up before the expiration date, you must request a new code. · YES.TAP Access Code: [email or web address] Expires: 12/19/2017 12:28 PM 
 
4. Enter the last four digits of your Social Security Number (xxxx) and Date of Birth (mm/dd/yyyy) as indicated and click Submit. You will be taken to the next sign-up page. 5. Create a YES.TAP ID. This will be your YES.TAP login ID and cannot be changed, so think of one that is secure and easy to remember. 6. Create a YES[employer]TAP password. You can change your password at any time. 7. Enter your Password Reset Question and Answer. This can be used at a later time if you forget your password. 8. Enter your e-mail address. You will receive e-mail notification when new information is available in [address] 9. Click Sign Up. You can now view and download portions of your medical record. 10. Click the Download Summary menu link to download a portable copy of your medical information. If you have questions, please visit the Frequently Asked Questions section of the [employer] website. Remember, [employer] is NOT to be used for urgent needs. For medical emergencies, dial 911. Now available from your iPhone and Android! Please provide this summary of care documentation to your next provider. Your primary care clinician is listed as Bhargavi Rangel. If you have any questions after today's visit, please call [phone].

## 2017-09-30 LAB
ALBUMIN SERPL-MCNC: 4.1 G/DL (ref 3.5–4.8)
ALBUMIN/GLOB SERPL: 1.4 {RATIO} (ref 1.2–2.2)
ALP SERPL-CCNC: 50 IU/L (ref 39–117)
ALT SERPL-CCNC: 18 IU/L (ref 0–44)
APPEARANCE UR: CLEAR
AST SERPL-CCNC: 23 IU/L (ref 0–40)
BASOPHILS # BLD AUTO: 0 X10E3/UL (ref 0–0.2)
BASOPHILS NFR BLD AUTO: 0 %
BILIRUB SERPL-MCNC: 0.4 MG/DL (ref 0–1.2)
BILIRUB UR QL STRIP: NEGATIVE
BUN SERPL-MCNC: 11 MG/DL (ref 8–27)
BUN/CREAT SERPL: 9 (ref 10–24)
CALCIUM SERPL-MCNC: 9.7 MG/DL (ref 8.6–10.2)
CHLORIDE SERPL-SCNC: 104 MMOL/L (ref 96–106)
CHOLEST SERPL-MCNC: 192 MG/DL (ref 100–199)
CO2 SERPL-SCNC: 27 MMOL/L (ref 18–29)
COLOR UR: YELLOW
CREAT SERPL-MCNC: 1.22 MG/DL (ref 0.76–1.27)
EOSINOPHIL # BLD AUTO: 0.1 X10E3/UL (ref 0–0.4)
EOSINOPHIL NFR BLD AUTO: 2 %
ERYTHROCYTE [DISTWIDTH] IN BLOOD BY AUTOMATED COUNT: 13.9 % (ref 12.3–15.4)
EST. AVERAGE GLUCOSE BLD GHB EST-MCNC: 97 MG/DL
GLOBULIN SER CALC-MCNC: 2.9 G/DL (ref 1.5–4.5)
GLUCOSE SERPL-MCNC: 85 MG/DL (ref 65–99)
GLUCOSE UR QL: NEGATIVE
HBA1C MFR BLD: 5 % (ref 4.8–5.6)
HCT VFR BLD AUTO: 37.9 % (ref 37.5–51)
HDLC SERPL-MCNC: 61 MG/DL
HGB BLD-MCNC: 13.1 G/DL (ref 12.6–17.7)
HGB UR QL STRIP: NEGATIVE
IMM GRANULOCYTES # BLD: 0 X10E3/UL (ref 0–0.1)
IMM GRANULOCYTES NFR BLD: 0 %
KETONES UR QL STRIP: NEGATIVE
LDLC SERPL CALC-MCNC: 113 MG/DL (ref 0–99)
LEUKOCYTE ESTERASE UR QL STRIP: NEGATIVE
LYMPHOCYTES # BLD AUTO: 2.1 X10E3/UL (ref 0.7–3.1)
LYMPHOCYTES NFR BLD AUTO: 42 %
MCH RBC QN AUTO: 29.9 PG (ref 26.6–33)
MCHC RBC AUTO-ENTMCNC: 34.6 G/DL (ref 31.5–35.7)
MCV RBC AUTO: 87 FL (ref 79–97)
MICRO URNS: NORMAL
MONOCYTES # BLD AUTO: 0.3 X10E3/UL (ref 0.1–0.9)
MONOCYTES NFR BLD AUTO: 6 %
NEUTROPHILS # BLD AUTO: 2.5 X10E3/UL (ref 1.4–7)
NEUTROPHILS NFR BLD AUTO: 50 %
NITRITE UR QL STRIP: NEGATIVE
PH UR STRIP: 6 [PH] (ref 5–7.5)
PLATELET # BLD AUTO: 274 X10E3/UL (ref 150–379)
POTASSIUM SERPL-SCNC: 4.1 MMOL/L (ref 3.5–5.2)
PROT SERPL-MCNC: 7 G/DL (ref 6–8.5)
PROT UR QL STRIP: NEGATIVE
PSA SERPL-MCNC: 2.6 NG/ML (ref 0–4)
RBC # BLD AUTO: 4.38 X10E6/UL (ref 4.14–5.8)
SODIUM SERPL-SCNC: 143 MMOL/L (ref 134–144)
SP GR UR: 1.02 (ref 1–1.03)
TRIGL SERPL-MCNC: 88 MG/DL (ref 0–149)
UROBILINOGEN UR STRIP-MCNC: 0.2 MG/DL (ref 0.2–1)
VLDLC SERPL CALC-MCNC: 18 MG/DL (ref 5–40)
WBC # BLD AUTO: 5 X10E3/UL (ref 3.4–10.8)

## 2017-12-07 DIAGNOSIS — Z12.11 SCREEN FOR COLON CANCER: Primary | ICD-10-CM

## 2017-12-26 DIAGNOSIS — E78.5 DYSLIPIDEMIA: ICD-10-CM

## 2018-01-02 RX ORDER — FENOFIBRATE 48 MG/1
TABLET, COATED ORAL
Qty: 90 TAB | Refills: 3 | Status: SHIPPED | OUTPATIENT
Start: 2018-01-02 | End: 2018-09-25 | Stop reason: SDUPTHER

## 2018-01-02 RX ORDER — LISINOPRIL AND HYDROCHLOROTHIAZIDE 12.5; 2 MG/1; MG/1
TABLET ORAL
Qty: 90 TAB | Refills: 1 | Status: SHIPPED | OUTPATIENT
Start: 2018-01-02 | End: 2018-10-21 | Stop reason: SDUPTHER

## 2018-01-17 ENCOUNTER — ANESTHESIA (OUTPATIENT)
Dept: ENDOSCOPY | Age: 73
End: 2018-01-17
Payer: MEDICARE

## 2018-01-17 ENCOUNTER — ANESTHESIA EVENT (OUTPATIENT)
Dept: ENDOSCOPY | Age: 73
End: 2018-01-17
Payer: MEDICARE

## 2018-01-17 ENCOUNTER — HOSPITAL ENCOUNTER (OUTPATIENT)
Age: 73
Setting detail: OUTPATIENT SURGERY
Discharge: HOME | End: 2018-01-17
Attending: INTERNAL MEDICINE | Admitting: INTERNAL MEDICINE
Payer: MEDICARE

## 2018-01-17 VITALS
HEART RATE: 58 BPM | DIASTOLIC BLOOD PRESSURE: 77 MMHG | TEMPERATURE: 98 F | SYSTOLIC BLOOD PRESSURE: 116 MMHG | HEIGHT: 66 IN | BODY MASS INDEX: 23.71 KG/M2 | WEIGHT: 147.56 LBS | OXYGEN SATURATION: 100 % | RESPIRATION RATE: 14 BRPM

## 2018-01-17 LAB
H PYLORI FROM TISSUE: NEGATIVE
KIT LOT NO., HCLOLOT: NORMAL
NEGATIVE CONTROL: NEGATIVE
POSITIVE CONTROL: POSITIVE

## 2018-01-17 PROCEDURE — 74011250636 HC RX REV CODE- 250/636

## 2018-01-17 PROCEDURE — 74011000250 HC RX REV CODE- 250

## 2018-01-17 PROCEDURE — 76060000031 HC ANESTHESIA FIRST 0.5 HR: Performed by: INTERNAL MEDICINE

## 2018-01-17 PROCEDURE — 77030027957 HC TBNG IRR ENDOGTR BUSS -B: Performed by: INTERNAL MEDICINE

## 2018-01-17 PROCEDURE — 77030009426 HC FCPS BIOP ENDOSC BSC -B: Performed by: INTERNAL MEDICINE

## 2018-01-17 PROCEDURE — 88305 TISSUE EXAM BY PATHOLOGIST: CPT | Performed by: INTERNAL MEDICINE

## 2018-01-17 PROCEDURE — 76040000019: Performed by: INTERNAL MEDICINE

## 2018-01-17 PROCEDURE — 74011250637 HC RX REV CODE- 250/637: Performed by: INTERNAL MEDICINE

## 2018-01-17 PROCEDURE — 87077 CULTURE AEROBIC IDENTIFY: CPT | Performed by: INTERNAL MEDICINE

## 2018-01-17 RX ORDER — SODIUM CHLORIDE 0.9 % (FLUSH) 0.9 %
5-10 SYRINGE (ML) INJECTION EVERY 8 HOURS
Status: DISCONTINUED | OUTPATIENT
Start: 2018-01-17 | End: 2018-01-17 | Stop reason: HOSPADM

## 2018-01-17 RX ORDER — SODIUM CHLORIDE 9 MG/ML
50 INJECTION, SOLUTION INTRAVENOUS CONTINUOUS
Status: DISCONTINUED | OUTPATIENT
Start: 2018-01-17 | End: 2018-01-17 | Stop reason: HOSPADM

## 2018-01-17 RX ORDER — MIDAZOLAM HYDROCHLORIDE 1 MG/ML
.25-5 INJECTION, SOLUTION INTRAMUSCULAR; INTRAVENOUS
Status: DISCONTINUED | OUTPATIENT
Start: 2018-01-17 | End: 2018-01-17 | Stop reason: HOSPADM

## 2018-01-17 RX ORDER — NALOXONE HYDROCHLORIDE 0.4 MG/ML
0.4 INJECTION, SOLUTION INTRAMUSCULAR; INTRAVENOUS; SUBCUTANEOUS
Status: DISCONTINUED | OUTPATIENT
Start: 2018-01-17 | End: 2018-01-17 | Stop reason: HOSPADM

## 2018-01-17 RX ORDER — PROPOFOL 10 MG/ML
INJECTION, EMULSION INTRAVENOUS AS NEEDED
Status: DISCONTINUED | OUTPATIENT
Start: 2018-01-17 | End: 2018-01-17 | Stop reason: HOSPADM

## 2018-01-17 RX ORDER — ATROPINE SULFATE 0.1 MG/ML
0.5 INJECTION INTRAVENOUS
Status: DISCONTINUED | OUTPATIENT
Start: 2018-01-17 | End: 2018-01-17 | Stop reason: HOSPADM

## 2018-01-17 RX ORDER — FENTANYL CITRATE 50 UG/ML
100 INJECTION, SOLUTION INTRAMUSCULAR; INTRAVENOUS
Status: DISCONTINUED | OUTPATIENT
Start: 2018-01-17 | End: 2018-01-17 | Stop reason: HOSPADM

## 2018-01-17 RX ORDER — LIDOCAINE HYDROCHLORIDE 20 MG/ML
INJECTION, SOLUTION EPIDURAL; INFILTRATION; INTRACAUDAL; PERINEURAL AS NEEDED
Status: DISCONTINUED | OUTPATIENT
Start: 2018-01-17 | End: 2018-01-17 | Stop reason: HOSPADM

## 2018-01-17 RX ORDER — DEXTROMETHORPHAN/PSEUDOEPHED 2.5-7.5/.8
1.2 DROPS ORAL
Status: DISCONTINUED | OUTPATIENT
Start: 2018-01-17 | End: 2018-01-17 | Stop reason: HOSPADM

## 2018-01-17 RX ORDER — SODIUM CHLORIDE 9 MG/ML
INJECTION, SOLUTION INTRAVENOUS
Status: DISCONTINUED | OUTPATIENT
Start: 2018-01-17 | End: 2018-01-17 | Stop reason: HOSPADM

## 2018-01-17 RX ORDER — EPINEPHRINE 0.1 MG/ML
1 INJECTION INTRACARDIAC; INTRAVENOUS
Status: DISCONTINUED | OUTPATIENT
Start: 2018-01-17 | End: 2018-01-17 | Stop reason: HOSPADM

## 2018-01-17 RX ORDER — SODIUM CHLORIDE 0.9 % (FLUSH) 0.9 %
5-10 SYRINGE (ML) INJECTION AS NEEDED
Status: DISCONTINUED | OUTPATIENT
Start: 2018-01-17 | End: 2018-01-17 | Stop reason: HOSPADM

## 2018-01-17 RX ORDER — FLUMAZENIL 0.1 MG/ML
0.2 INJECTION INTRAVENOUS
Status: DISCONTINUED | OUTPATIENT
Start: 2018-01-17 | End: 2018-01-17 | Stop reason: HOSPADM

## 2018-01-17 RX ADMIN — SODIUM CHLORIDE: 9 INJECTION, SOLUTION INTRAVENOUS at 12:43

## 2018-01-17 RX ADMIN — PROPOFOL 25 MG: 10 INJECTION, EMULSION INTRAVENOUS at 13:28

## 2018-01-17 RX ADMIN — LIDOCAINE HYDROCHLORIDE 50 MG: 20 INJECTION, SOLUTION EPIDURAL; INFILTRATION; INTRACAUDAL; PERINEURAL at 13:18

## 2018-01-17 RX ADMIN — PROPOFOL 25 MG: 10 INJECTION, EMULSION INTRAVENOUS at 13:34

## 2018-01-17 RX ADMIN — PROPOFOL 20 MG: 10 INJECTION, EMULSION INTRAVENOUS at 13:19

## 2018-01-17 RX ADMIN — PROPOFOL 20 MG: 10 INJECTION, EMULSION INTRAVENOUS at 13:21

## 2018-01-17 RX ADMIN — PROPOFOL 25 MG: 10 INJECTION, EMULSION INTRAVENOUS at 13:31

## 2018-01-17 RX ADMIN — PROPOFOL 70 MG: 10 INJECTION, EMULSION INTRAVENOUS at 13:18

## 2018-01-17 RX ADMIN — PROPOFOL 20 MG: 10 INJECTION, EMULSION INTRAVENOUS at 13:26

## 2018-01-17 RX ADMIN — PROPOFOL 20 MG: 10 INJECTION, EMULSION INTRAVENOUS at 13:24

## 2018-01-17 NOTE — ANESTHESIA PREPROCEDURE EVALUATION
Anesthetic History               Review of Systems / Medical History  Patient summary reviewed, nursing notes reviewed and pertinent labs reviewed    Pulmonary            Asthma        Neuro/Psych              Cardiovascular    Hypertension          CAD    Exercise tolerance: >4 METS  Comments: Normal stress 2015   GI/Hepatic/Renal     GERD          Comments: screen Endo/Other        Arthritis     Other Findings            Physical Exam    Airway  Mallampati: I  TM Distance: > 6 cm  Neck ROM: normal range of motion   Mouth opening: Normal     Cardiovascular    Rhythm: regular  Rate: normal         Dental         Pulmonary  Breath sounds clear to auscultation               Abdominal         Other Findings            Anesthetic Plan    ASA: 2  Anesthesia type: MAC          Induction: Intravenous  Anesthetic plan and risks discussed with: Patient

## 2018-01-17 NOTE — ANESTHESIA POSTPROCEDURE EVALUATION
Post-Anesthesia Evaluation and Assessment    Patient: Loyd Riedel. MRN: [id number]  SSN: [id number]    YOB: 1945  Age: 74 y.o. Sex: male       Cardiovascular Function/Vital Signs  Visit Vitals    BP 91/67    Pulse 63    Temp 36.9 °C (98.5 °F)    Resp 17    Ht 5' 6\" (1.676 m)    Wt 66.9 kg (147 lb 9 oz)    SpO2 97%    BMI 23.82 kg/m2       Patient is status post MAC anesthesia for Procedure(s):  ESOPHAGOGASTRODUODENOSCOPY (EGD), COLONOSCOPY  COLONOSCOPY  ESOPHAGOGASTRODUODENAL (EGD) BIOPSY. Nausea/Vomiting: None    Postoperative hydration reviewed and adequate. Pain:  Pain Scale 1: Numeric (0 - 10) (01/17/18 1305)  Pain Intensity 1: 0 (01/17/18 1305)   Managed    Neurological Status: At baseline    Mental Status and Level of Consciousness: Arousable    Pulmonary Status:   O2 Device: CO2 nasal cannula (01/17/18 1334)   Adequate oxygenation and airway patent    Complications related to anesthesia: None    Post-anesthesia assessment completed.  No concerns    Signed By: Glenn Mendoza MD     January 17, 2018

## 2018-01-17 NOTE — H&P
Jesse [address]  [address]                 Danyell Pretty is a  74 y.o.  male who presents with reflux symptoms and for screening.  .        Past Medical History:   Diagnosis Date    Asthma     hx asthma - no attack in 40+ yrs    CAD (coronary artery disease)     Chest pain, unspecified     Chest pain, unspecified     Chest pain, unspecified     Coronary atherosclerosis of native coronary artery 07/17/2012    stent    Coronary atherosclerosis of native coronary artery     Essential hypertension     Essential hypertension, benign     Lumbar spinal stenosis 08/03/2013    SPINE LUMBAR LAMINECTOMY WITH INSTRUMENTATION - p tamiko    Mixed hyperlipidemia     Other ill-defined conditions(799.89)     HIGH CHOLESTEROL    Other malaise and fatigue     Pure hypercholesterolemia      Past Surgical History:   Procedure Laterality Date    HX BACK SURGERY      THORACIC SPINE SURGERY  X2    HX GI      COLONOSCOPY    HX HEART CATHETERIZATION      STENT (DR LARSON)    HX LUMBAR LAMINECTOMY  8/2013    HX PTCA       Allergies   Allergen Reactions    Statins-Hmg-Coa Reductase Inhibitors Other (comments)     Body aches     Current Facility-Administered Medications   Medication Dose Route Frequency Provider Last Rate Last Dose    0.9% sodium chloride infusion  50 mL/hr IntraVENous CONTINUOUS Lanana Never, MD        sodium chloride (NS) flush 5-10 mL  5-10 mL IntraVENous Q8H Delphina Never, MD        sodium chloride (NS) flush 5-10 mL  5-10 mL IntraVENous PRN Delphina Never, MD        midazolam (VERSED) injection 0.25-5 mg  0.25-5 mg IntraVENous Multiple Lanana Never, MD        fentaNYL citrate (PF) injection 100 mcg  100 mcg IntraVENous Multiple Lanana Never, MD        naloxone [facility]) injection 0.4 mg  0.4 mg IntraVENous Multiple Lanana Never, MD        flumazenil (ROMAZICON) 0.1 mg/mL injection 0.2 mg  0.2 mg IntraVENous Karol Brown MD        [facility]) 21CT/8.3MR oral drops 80 mg  1.2 mL Oral Karol Brown MD        atropine injection 0.5 mg  0.5 mg IntraVENous ONCE PRN Cheo Brown MD        EPINEPHrine (ADRENALIN) 0.1 mg/mL syringe 1 mg  1 mg Endoscopically ONCE PRN Cheo Brown MD         Facility-Administered Medications Ordered in Other Encounters   Medication Dose Route Frequency Provider Last Rate Last Dose    0.9% sodium chloride infusion   IntraVENous CONTINUOUS Inell MARA Poole           Visit Vitals    /75    Pulse 62    Temp 98.5 °F (36.9 °C)    Resp 15    Ht 5' 6\" (1.676 m)    Wt 66.9 kg (147 lb 9 oz)    SpO2 97%    BMI 23.82 kg/m2           PHYSICAL EXAM:  General: WD, WN. Alert, cooperative, no acute distress    HEENT: NC, Atraumatic. PERRLA, EOMI. Anicteric sclerae. Mallampati score 2  Lungs:  CTA Bilaterally. No Wheezing/Rhonchi/Rales. Heart:  Regular  rhythm,  No murmur (), No Rubs, No Gallops  Abdomen: Soft, Non distended, Non tender.  +Bowel sounds, no HSM  Extremities: No c/c/e  Neurologic:  CN 2-12 gi, Alert and oriented X 3. No acute neurological distress   Psych:   Good insight. Not anxious nor agitated. Plan:   Endoscopic procedure with MAC.     Cheo Brown MD  1/17/2018  1:11 PM

## 2018-01-17 NOTE — PROCEDURES
[address]  [address] [facility], [address] [place] [address]                  :  King Robertson MD    Referring Provider: Ignacio Terry MD    Sedation:  MAC anesthesia Propofol      Prior to the procedure its objectives, risks, consequences and alternatives were discussed with the patient who then elected to proceed. The patient had the opportunity to ask questions and those questions were answered. A physical exam was performed. The heart, lungs, and mental status were examined prior to the procedure and found to be satisfactory for conscious sedation and for the procedure. Conscious sedation was initiated by the physician. Continuous pulse oximetry and blood pressure monitoring were used throughout the procedure. After appropriate pharyngeal anesthesia, the endoscope was passed into the esophagus without difficulty. The proximal esophagus is normal. In the distal esophagus there is grade 1 reflux esophagitis and a question of diamond's esophagus, multiple biopsies obtained. . The scope was then passed into the stomach without difficulty. The fundus is normal.  In the body and antrum there is mild gastritis. The pylorus, bulb and were unremarkable. There is a post bulbar narrowing, that would allow scope to pass. I elected not to dilate as it is not causing symptoms. A biopsy was obtained for Pyloritek from the gastric antrum and body. On slow withdrawal of the scope, retroflexion confirmed a normal cardia and fundus. He tolerated the procedure without complications and will be discharged later today. Specimen:  Biopsy for Pyloritek was obtained, biopsies of distal esophagus    Complications: None. EBL:  None.     King Robertson MD  1/17/2018 1:37 PM

## 2018-01-17 NOTE — PROCEDURES
Jesse 62  [address] [facility], [facility]         Procedure:  Colonoscopy    :  Laura Gillespie MD    Referring Provider: Armond Fu MD    Sedation:  MAC anesthesia Propofol      Prior to the procedure its objectives, risks, consequences and alternatives were discussed with the patient who then elected to proceed. The patient had the opportunity to ask questions and those questions were answered. A physical exam was performed. The heart, lungs, and mental status were examined prior to the procedure and found to be satisfactory for conscious sedation and for the procedure. Conscious sedation was initiated by the physician. Continuous pulse oximetry and blood pressure monitoring were used throughout the procedure. After appropriate analgesia and rectal exam, the colonoscope was passed into the anus and passed to the cecum without difficulty. The prep was good. On slow withdrawal of the scope, the cecum, ascending, colon, hepatic flexure, transverse colon, splenic flexure and descending colon were normal. In the sigmoid there were mild diverticulosis. The rectum was normal. Retroflexion exam of the rectum was normal He tolerated the procedure without complication and I recommend a repeat colonoscopy in 10 years. Specimen Removed:  None    Complications: None. EBL:  None.     Laura Gillespie MD  1/17/2018  1:41 PM

## 2018-01-17 NOTE — IP AVS SNAPSHOT
[address] 
[phone] Patient: Bettie Davila Sr. MRN: [id number] WBZ:[birth year] About your hospitalization You were admitted on:  January 17, 2018 You last received care in the:  [facility] ENDOSCOPY You were discharged on:  January 17, 2018 Why you were hospitalized Your primary diagnosis was:  Not on File Follow-up Information None Discharge Orders None A check abner indicates which time of day the medication should be taken. My Medications CONTINUE taking these medications Instructions Each Dose to Equal  
 Morning Noon Evening Bedtime  
 aspirin delayed-release 81 mg tablet Your last dose was: Your next dose is: Take 81 mg by mouth daily. 81 mg  
    
   
   
   
  
 cholestyramine-sucrose 4 gram powder Commonly known as:  Power Holgate Your last dose was: Your next dose is: Take 4 g by mouth two (2) times daily (with meals). 4 g  
    
   
   
   
  
 fenofibrate nanocrystallized 48 mg tablet Commonly known as:  [employer] Your last dose was: Your next dose is: TAKE ONE TABLET BY MOUTH ONCE DAILY  
     
   
   
   
  
 flaxseed 1,000 mg Cap Your last dose was: Your next dose is: Take 1 Cap by mouth daily. 1 Cap  
    
   
   
   
  
 ibuprofen 200 mg tablet Commonly known as:  MOTRIN Your last dose was: Your next dose is: Take  by mouth daily as needed. lisinopril-hydroCHLOROthiazide 20-12.5 mg per tablet Commonly known as:  Charlaine Kohut Your last dose was: Your next dose is: TAKE ONE TABLET BY MOUTH ONCE DAILY MULTIPLE VITAMIN PO Your last dose was: Your next dose is: Take  by mouth daily. red yeast rice extract 600 mg Cap Your last dose was: Your next dose is: Take 600 mg by mouth daily. 600 mg STOOL SOFTENER PO Your last dose was: Your next dose is: Take 100 mg by mouth as needed. 100 mg Discharge Instructions Spike Rice. 
       
   
   DISCHARGE INSTRUCTIONS Alexander West 
[id number] 
1945 DISCOMFORT: 
Redness at IV site- apply warm compress to area; if redness or soreness persist- contact your physician There may be a slight amount of blood passed from the rectum Gaseous discomfort- walking, belching will help relieve any discomfort You may not operate a vehicle for 12 hours You may not  engage in an occupation involving machinery or appliances for rest of today You may not  drink alcoholic beverages for at least 12 hours Avoid making any critical decisions for at least 24 hour DIET: 
 High fiber diet.  however -  remember your colon is empty and a heavy meal will produce gas. Avoid these foods:  vegetables, fried / greasy foods, carbonated drinks for today ACTIVITY You may resume your normal daily activities Spend the remainder of the day resting -  avoid any strenuous activity. CALL M.D. ANY SIGN OF Increasing pain, nausea, vomiting Abdominal distension (swelling) New increased bleeding (oral or rectal) Fever (chills) Pain in chest area Bloody discharge from nose or mouth Shortness of breath ENDOSCOPY FINDINGS: 
 Your endoscopy showed reflux esophagitis and gastritis, biopsies are pending, colonoscopy revealed only diverticulosis. You may  take any Advil, Aspirin, Ibuprofen, Motrin, Aleve, or Goodys for 10 days, ONLY  Tylenol as needed for pain. Post procedure diagnosis:  1.- Reflux/Possible barretts Esophagus 2.- Gastritis 3.- Mild Diverticulosis 2.- Reflux esophagitis Follow-up Instructions: 
 Call Dr. yAo Hodges for any questions or problems. If we took a biopsy please call the office within 2 weeks to discuss your                             pathology results. Telephone # [phone] Introducing hospitals & [facility] SERVICES! [facility] introduces [employer] patient portal. Now you can access parts of your medical record, email your doctor's office, and request medication refills online. 1. In your internet browser, go to [email or web address] 2. Click on the First Time User? Click Here link in the Sign In box. You will see the New Member Sign Up page. 3. Enter your [employer] Access Code exactly as it appears below. You will not need to use this code after youve completed the sign-up process. If you do not sign up before the expiration date, you must request a new code. · [employer] Access Code: [email or web address] Expires: 4/17/2018  1:48 PM 
 
4. Enter the last four digits of your Social Security Number (xxxx) and Date of Birth (mm/dd/yyyy) as indicated and click Submit. You will be taken to the next sign-up page. 5. Create a [employer] ID. This will be your [employer] login ID and cannot be changed, so think of one that is secure and easy to remember. 6. Create a [employer] password. You can change your password at any time. 7. Enter your Password Reset Question and Answer. This can be used at a later time if you forget your password. 8. Enter your e-mail address. You will receive e-mail notification when new information is available in [address] 9. Click Sign Up. You can now view and download portions of your medical record. 10. Click the Download Summary menu link to download a portable copy of your medical information. If you have questions, please visit the Frequently Asked Questions section of the [employer] website. Remember, [employer] is NOT to be used for urgent needs. For medical emergencies, dial 911. Now available from your iPhone and Android! Providers Seen During Your Hospitalization Provider Specialty Primary office phone Leo Zavala MD Gastroenterology [phone] Your Primary Care Physician (PCP) Primary Care Physician Office Phone Office Fax Annika Meléndez [phone] You are allergic to the following Allergen Reactions Statins-Hmg-Coa Reductase Inhibitors Other (comments) Body aches Recent Documentation Height Weight BMI Smoking Status 1.676 m 66.9 kg 23.82 kg/m2 Never Smoker Emergency Contacts Name Discharge Info Relation Home Work Mobile Ismael Kauffman DISCHARGE CAREGIVER [3] Child [2] [phone] Edin Sen  Child [2]   [phone] Perez Sen  Child [2] [phone] Patient Belongings The following personal items are in your possession at time of discharge: 
  Dental Appliances: None  Visual Aid: None Please provide this summary of care documentation to your next provider. Signatures-by signing, you are acknowledging that this After Visit Summary has been reviewed with you and you have received a copy. Patient Signature:  ____________________________________________________________ Date:  ____________________________________________________________  
  
Nathan Ochoa Provider Signature:  ____________________________________________________________ Date:  ____________________________________________________________

## 2018-01-17 NOTE — PROGRESS NOTES

## 2018-01-17 NOTE — DISCHARGE INSTRUCTIONS
[address] [facility]  [id number]  1945    DISCOMFORT:  Redness at IV site- apply warm compress to area; if redness or soreness persist- contact your physician  There may be a slight amount of blood passed from the rectum  Gaseous discomfort- walking, belching will help relieve any discomfort  You may not operate a vehicle for 12 hours  You may not  engage in an occupation involving machinery or appliances for rest of today  You may not  drink alcoholic beverages for at least 12 hours  Avoid making any critical decisions for at least 24 hour    DIET:   High fiber diet. - however -  remember your colon is empty and a heavy meal will produce gas. Avoid these foods:  vegetables, fried / greasy foods, carbonated drinks for today      ACTIVITY  You may resume your normal daily activities   Spend the remainder of the day resting -  avoid any strenuous activity. CALL M.D. ANY SIGN OF   Increasing pain, nausea, vomiting  Abdominal distension (swelling)  New increased bleeding (oral or rectal)  Fever (chills)  Pain in chest area  Bloody discharge from nose or mouth  Shortness of breath        ENDOSCOPY FINDINGS:   Your endoscopy showed reflux esophagitis and gastritis, biopsies are pending, colonoscopy revealed only diverticulosis. You may  take any Advil, Aspirin, Ibuprofen, Motrin, Aleve, or Goodys for 10 days, ONLY  Tylenol as needed for pain. Post procedure diagnosis:  1.- Reflux/Possible barretts Esophagus  2.- Gastritis  3.- Mild Diverticulosis  2.- Reflux esophagitis    Follow-up Instructions:   Call Dr. Peggy Toro for any questions or problems. If we took a biopsy please call the office within 2 weeks to discuss your                             pathology results.  Telephone # [phone]

## 2018-01-17 NOTE — ROUTINE PROCESS
Cristóbal Borrego Sr.  1945  [id number]    Situation:  Verbal report received from: Adan Chan  Procedure: Procedure(s):  ESOPHAGOGASTRODUODENOSCOPY (EGD), COLONOSCOPY  COLONOSCOPY  ESOPHAGOGASTRODUODENAL (EGD) BIOPSY    Background:    Preoperative diagnosis: REFLUX, SCREENING  Postoperative diagnosis: 1.- Reflux/Possible barretts Esophagus  2.- Gastritis  3.- Mild Diverticulosis  2.-     :  Dr. Karen Rosenbaum  Assistant(s): Endoscopy Technician-1: Sylwia Logan  Endoscopy RN-1: Fadumo Adhikari RN    Specimens:   ID Type Source Tests Collected by Time Destination   1 : Distal Esophagus Biopsy Preservative   Jen Lowry MD 1/17/2018 1327 Pathology     H. Pylori  yes    Assessment:  Intra-procedure medications     Anesthesia gave intra-procedure sedation and medications, see anesthesia flow sheet yes    Intravenous fluids: NS@ KVO     Vital signs stable     Abdominal assessment: round and soft     Recommendation:  Discharge patient per MD order.     Family or Friend   Permission to share finding with family or friend yes

## 2018-03-20 ENCOUNTER — OFFICE VISIT (OUTPATIENT)
Dept: CARDIOLOGY CLINIC | Age: 73
End: 2018-03-20

## 2018-03-20 VITALS
HEART RATE: 56 BPM | SYSTOLIC BLOOD PRESSURE: 116 MMHG | WEIGHT: 158 LBS | DIASTOLIC BLOOD PRESSURE: 78 MMHG | OXYGEN SATURATION: 99 % | HEIGHT: 66 IN | BODY MASS INDEX: 25.39 KG/M2 | RESPIRATION RATE: 12 BRPM

## 2018-03-20 DIAGNOSIS — G89.29 CHRONIC LEFT-SIDED LOW BACK PAIN WITH LEFT-SIDED SCIATICA: Primary | ICD-10-CM

## 2018-03-20 DIAGNOSIS — M54.42 CHRONIC LEFT-SIDED LOW BACK PAIN WITH LEFT-SIDED SCIATICA: Primary | ICD-10-CM

## 2018-03-20 DIAGNOSIS — G90.01 CAROTID SINUS SYNDROME: ICD-10-CM

## 2018-03-20 DIAGNOSIS — E78.5 DYSLIPIDEMIA: ICD-10-CM

## 2018-03-20 DIAGNOSIS — I25.10 ATHEROSCLEROSIS OF NATIVE CORONARY ARTERY OF NATIVE HEART WITHOUT ANGINA PECTORIS: ICD-10-CM

## 2018-03-20 DIAGNOSIS — K21.9 GASTROESOPHAGEAL REFLUX DISEASE WITHOUT ESOPHAGITIS: ICD-10-CM

## 2018-03-20 DIAGNOSIS — Z98.61 S/P PTCA (PERCUTANEOUS TRANSLUMINAL CORONARY ANGIOPLASTY): ICD-10-CM

## 2018-03-20 DIAGNOSIS — I10 ESSENTIAL HYPERTENSION, BENIGN: ICD-10-CM

## 2018-03-20 PROBLEM — M54.40 CHRONIC LEFT-SIDED LOW BACK PAIN WITH SCIATICA: Status: ACTIVE | Noted: 2018-03-20

## 2018-03-20 RX ORDER — CHOLESTYRAMINE 4 G/9G
4 POWDER, FOR SUSPENSION ORAL 2 TIMES DAILY WITH MEALS
Qty: 30 G | Refills: 11 | Status: SHIPPED | OUTPATIENT
Start: 2018-03-20 | End: 2018-04-05 | Stop reason: ALTCHOICE

## 2018-03-20 RX ORDER — OMEPRAZOLE 20 MG/1
20 CAPSULE, DELAYED RELEASE ORAL DAILY
COMMUNITY
Start: 2018-03-06 | End: 2020-04-17

## 2018-03-20 NOTE — MR AVS SNAPSHOT
[facility] 
 
 
 [address] [place] 
[phone] Patient: Vu Kern Sr. MRN: [id number] Visit Information Date & Time Provider Department Dept. Phone Encounter #  
 3/20/2018 10:20 AM MD Kassie Abdalla [facility] [place] [address] [id number] Your Appointments 3/23/2018 10:45 AM  
Any with Amaris Maher MD  
[address] [place] [address] and Primary Care [facility]) Appt Note: 6 month f/u htn  
 [address]  
[phone]  
  
   
 [phone] [id number]  
  
    
 9/25/2018 10:15 AM  
ESTABLISHED PATIENT with MD Kassie Abdalla [facility]) Appt Note: 6 MO. F/U FOLLOWING LABS  
 [address] [place]  
[phone] [address] Upcoming Health Maintenance Date Due DTaP/Tdap/Td series (1 - Tdap) 10/4/1964 FOBT Q 1 YEAR AGE 50-75 10/4/1993 ZOSTER VACCINE AGE 60> 8/4/2003 GLAUCOMA SCREENING Q2Y 10/4/2008 Pneumococcal 65+ Low/Medium Risk (2 of 2 - PCV13) 8/13/2014 Influenza Age 5 to Adult 8/1/2017 MEDICARE YEARLY EXAM 3/14/2018 Allergies as of 3/20/2018  Review Complete On: 3/20/2018 By: Kee Uriostegui LPN Severity Noted Reaction Type Reactions Statins-hmg-coa Reductase Inhibitors  07/17/2012    Other (comments) Body aches Current Immunizations  Reviewed on 8/12/2013 Name Date Pneumococcal Polysaccharide (PPSV-23) 8/13/2013  1:04 PM  
  
 Not reviewed this visit You Were Diagnosed With   
  
 Codes Comments Dyslipidemia    -  Primary ICD-10-CM: E78.5 ICD-9-CM: 272.4 Atherosclerosis of native coronary artery of native heart without angina pectoris     ICD-10-CM: I25.10 ICD-9-CM: 414.01   
 Essential hypertension, benign     ICD-10-CM: I10 
ICD-9-CM: 401.1 S/P PTCA (percutaneous transluminal coronary angioplasty)     ICD-10-CM: Z98.61 ICD-9-CM: V45.82 Carotid sinus syndrome     ICD-10-CM: G90.01 
ICD-9-CM: 337.01 Vitals BP Pulse Resp Height(growth percentile) Weight(growth percentile) SpO2  
 116/78 (BP 1 Location: Right arm, BP Patient Position: Sitting) (!) 56 12 5' 6\" (1.676 m) 158 lb (71.7 kg) 99% BMI Smoking Status 25.5 kg/m2 Never Smoker Vitals History BMI and BSA Data Body Mass Index Body Surface Area 25.5 kg/m 2 1.83 m 2 Preferred Pharmacy Pharmacy Name Phone [address] [facility] [address] [place] [address] [phone] Your Updated Medication List  
  
   
This list is accurate as of 3/20/18 11:51 AM.  Always use your most recent med list.  
  
  
  
  
 aspirin delayed-release 81 mg tablet Take 81 mg by mouth daily. cholestyramine-sucrose 4 gram powder Commonly known as:  [employer] Take 4 g by mouth two (2) times daily (with meals). fenofibrate nanocrystallized 48 mg tablet Commonly known as:  TRICOR  
TAKE ONE TABLET BY MOUTH ONCE DAILY  
  
 flaxseed 1,000 mg Cap Take 1 Cap by mouth daily. ibuprofen 200 mg tablet Commonly known as:  MOTRIN Take  by mouth daily as needed. lisinopril-hydroCHLOROthiazide 20-12.5 mg per tablet Commonly known as:  PRINZIDE, ZESTORETIC  
TAKE ONE TABLET BY MOUTH ONCE DAILY MULTIPLE VITAMIN PO Take  by mouth daily. omeprazole 20 mg capsule Commonly known as:  PRILOSEC  
  
 red yeast rice extract 600 mg Cap Take 600 mg by mouth daily. STOOL SOFTENER PO Take 100 mg by mouth as needed. Prescriptions Sent to Pharmacy Refills  
 cholestyramine-sucrose (QUESTRAN) 4 gram powder 11 Sig: Take 4 g by mouth two (2) times daily (with meals).   
 Class: Normal  
 Pharmacy: [facility] [address], [address] [facility] #: [phone] Route: Oral  
  
We Performed the Following AMB POC EKG ROUTINE W/ 12 LEADS, INTER & REP [03160 CPT(R)] CK [id number] CPT(R)] LIPID PANEL [56784 CPT(R)] METABOLIC PANEL, COMPREHENSIVE [77377 CPT(R)] Patient Instructions -- 6 month follow up 
-- Please get labs drawn Heart-Healthy Diet: Care Instructions Your Care Instructions A heart-healthy diet has lots of vegetables, fruits, nuts, beans, and whole grains, and is low in salt. It limits foods that are high in saturated fat, such as meats, cheeses, and fried foods. It may be hard to change your diet, but even small changes can lower your risk of heart attack and heart disease. Follow-up care is a key part of your treatment and safety. Be sure to make and go to all appointments, and call your doctor if you are having problems. It's also a good idea to know your test results and keep a list of the medicines you take. How can you care for yourself at home? Watch your portions · Learn what a serving is. A \"serving\" and a \"portion\" are not always the same thing. Make sure that you are not eating larger portions than are recommended. For example, a serving of pasta is ½ cup. A serving size of meat is 2 to 3 ounces. A 3-ounce serving is about the size of a deck of cards. Measure serving sizes until you are good at [place]" them. Keep in mind that restaurants often serve portions that are 2 or 3 times the size of one serving. · To keep your energy level up and keep you from feeling hungry, eat often but in smaller portions. · Eat only the number of calories you need to stay at a healthy weight. If you need to lose weight, eat fewer calories than your body burns (through exercise and other physical activity). Eat more fruits and vegetables · Eat a variety of fruit and vegetables every day.  Dark green, deep orange, red, or yellow fruits and vegetables are especially good for you. Examples include spinach, carrots, peaches, and berries. · Keep carrots, celery, and other veggies handy for snacks. Buy fruit that is in season and store it where you can see it so that you will be tempted to eat it. · Cook dishes that have a lot of veggies in them, such as stir-fries and soups. Limit saturated and trans fat · Read food labels, and try to avoid saturated and trans fats. They increase your risk of heart disease. Trans fat is found in many processed foods such as cookies and crackers. · Use olive or canola oil when you cook. Try cholesterol-lowering spreads, such as Benecol or Take Control. · Bake, broil, grill, or steam foods instead of frying them. · Choose lean meats instead of high-fat meats such as hot dogs and sausages. Cut off all visible fat when you prepare meat. · Eat fish, skinless poultry, and meat alternatives such as soy products instead of high-fat meats. Soy products, such as tofu, may be especially good for your heart. · Choose low-fat or fat-free milk and dairy products. Eat fish · Eat at least two servings of fish a week. Certain fish, such as salmon and tuna, contain omega-3 fatty acids, which may help reduce your risk of heart attack. Eat foods high in fiber · Eat a variety of grain products every day. Include whole-grain foods that have lots of fiber and nutrients. Examples of whole-grain foods include oats, whole wheat bread, and brown rice. · Buy whole-grain breads and cereals, instead of white bread or pastries. Limit salt and sodium · Limit how much salt and sodium you eat to help lower your blood pressure. · Taste food before you salt it. Add only a little salt when you think you need it. With time, your taste buds will adjust to less salt. · Eat fewer snack items, fast foods, and other high-salt, processed foods. Check food labels for the amount of sodium in packaged foods. · Choose low-sodium versions of canned goods (such as soups, vegetables, and beans). Limit sugar · Limit drinks and foods with added sugar. These include candy, desserts, and soda pop. Limit alcohol · Limit alcohol to no more than 2 drinks a day for men and 1 drink a day for women. Too much alcohol can cause health problems. When should you call for help? Watch closely for changes in your health, and be sure to contact your doctor if: 
? · You would like help planning heart-healthy meals. Where can you learn more? Go to http://valerieOxtexvivian.info/. Enter V137 in the search box to learn more about \"Heart-Healthy Diet: Care Instructions. \" Current as of: September 21, 2016 Content Version: 11.4 © 4582-3009 [employer]io[employer]ide. Care instructions adapted under license by [employer] (which disclaims liability or warranty for this information). If you have questions about a medical condition or this instruction, always ask your healthcare professional. [place] [address] any warranty or liability for your use of this information. Introducing [facility]! Ricardo Gadiel introduces AccuVein patient portal. Now you can access parts of your medical record, email your doctor's office, and request medication refills online. 1. In your internet browser, go to [email or web address] 2. Click on the First Time User? Click Here link in the Sign In box. You will see the New Member Sign Up page. 3. Enter your AccuVein Access Code exactly as it appears below. You will not need to use this code after youve completed the sign-up process. If you do not sign up before the expiration date, you must request a new code. · AccuVein Access Code: [email or web address] Expires: 4/17/2018  2:48 PM 
 
4. Enter the last four digits of your Social Security Number (xxxx) and Date of Birth (mm/dd/yyyy) as indicated and click Submit.  You will be taken to the next sign-up page. 5. Create a Chic by Choice ID. This will be your Chic by Choice login ID and cannot be changed, so think of one that is secure and easy to remember. 6. Create a Ch[employer] Choice password. You can change your password at any time. 7. Enter your Password Reset Question and Answer. This can be used at a later time if you forget your password. 8. Enter your e-mail address. You will receive e-mail notification when new information is available in [address] 9. Click Sign Up. You can now view and download portions of your medical record. 10. Click the Download Summary menu link to download a portable copy of your medical information. If you have questions, please visit the Frequently Asked Questions section of the Chic [employer] Choice website. Remember, Ch[employer] Choice is NOT to be used for urgent needs. For medical emergencies, dial 911. Now available from your iPhone and Android! Please provide this summary of care documentation to your next provider. Your primary care clinician is listed as Sylvain Pantoja. If you have any questions after today's visit, please call [phone].

## 2018-03-20 NOTE — PATIENT INSTRUCTIONS
-- 6 month follow up  -- Please get labs drawn         Heart-Healthy Diet: Care Instructions  Your Care Instructions    A heart-healthy diet has lots of vegetables, fruits, nuts, beans, and whole grains, and is low in salt. It limits foods that are high in saturated fat, such as meats, cheeses, and fried foods. It may be hard to change your diet, but even small changes can lower your risk of heart attack and heart disease. Follow-up care is a key part of your treatment and safety. Be sure to make and go to all appointments, and call your doctor if you are having problems. It's also a good idea to know your test results and keep a list of the medicines you take. How can you care for yourself at home? Watch your portions  · Learn what a serving is. A \"serving\" and a \"portion\" are not always the same thing. Make sure that you are not eating larger portions than are recommended. For example, a serving of pasta is ½ cup. A serving size of meat is 2 to 3 ounces. A 3-ounce serving is about the size of a deck of cards. Measure serving sizes until you are good at [place]" them. Keep in mind that restaurants often serve portions that are 2 or 3 times the size of one serving. · To keep your energy level up and keep you from feeling hungry, eat often but in smaller portions. · Eat only the number of calories you need to stay at a healthy weight. If you need to lose weight, eat fewer calories than your body burns (through exercise and other physical activity). Eat more fruits and vegetables  · Eat a variety of fruit and vegetables every day. Dark green, deep orange, red, or yellow fruits and vegetables are especially good for you. Examples include spinach, carrots, peaches, and berries. · Keep carrots, celery, and other veggies handy for snacks. Buy fruit that is in season and store it where you can see it so that you will be tempted to eat it.   · Cook dishes that have a lot of veggies in them, such as stir-fries and soups.  Limit saturated and trans fat  · Read food labels, and try to avoid saturated and trans fats. They increase your risk of heart disease. Trans fat is found in many processed foods such as cookies and crackers. · Use olive or canola oil when you cook. Try cholesterol-lowering spreads, such as Benecol or Take Control. · Bake, broil, grill, or steam foods instead of frying them. · Choose lean meats instead of high-fat meats such as hot dogs and sausages. Cut off all visible fat when you prepare meat. · Eat fish, skinless poultry, and meat alternatives such as soy products instead of high-fat meats. Soy products, such as tofu, may be especially good for your heart. · Choose low-fat or fat-free milk and dairy products. Eat fish  · Eat at least two servings of fish a week. Certain fish, such as salmon and tuna, contain omega-3 fatty acids, which may help reduce your risk of heart attack. Eat foods high in fiber  · Eat a variety of grain products every day. Include whole-grain foods that have lots of fiber and nutrients. Examples of whole-grain foods include oats, whole wheat bread, and brown rice. · Buy whole-grain breads and cereals, instead of white bread or pastries. Limit salt and sodium  · Limit how much salt and sodium you eat to help lower your blood pressure. · Taste food before you salt it. Add only a little salt when you think you need it. With time, your taste buds will adjust to less salt. · Eat fewer snack items, fast foods, and other high-salt, processed foods. Check food labels for the amount of sodium in packaged foods. · Choose low-sodium versions of canned goods (such as soups, vegetables, and beans). Limit sugar  · Limit drinks and foods with added sugar. These include candy, desserts, and soda pop. Limit alcohol  · Limit alcohol to no more than 2 drinks a day for men and 1 drink a day for women. Too much alcohol can cause health problems. When should you call for help?   Watch closely for changes in your health, and be sure to contact your doctor if:  ? · You would like help planning heart-healthy meals. Where can you learn more? Go to http://valerie-vivian.info/. Enter V137 in the search box to learn more about \"Heart-Healthy Diet: Care Instructions. \"  Current as of: September 21, 2016  Content Version: 11.4  © 9008-3799 [employer]. Care instructions adapted under license by [employer] (which disclaims liability or warranty for this information). If you have questions about a medical condition or this instruction, always ask your healthcare professional. [address] any warranty or liability for your use of this information.

## 2018-03-20 NOTE — PROGRESS NOTES
Chief Complaint   Patient presents with    Hypertension     no other complaints. 1. Have you been to the ER, urgent care clinic since your last visit? Hospitalized since your last visit? No    2. Have you seen or consulted any other health care providers outside of the [address] [place] [address] since your last visit? Include any pap smears or colon screening.  No

## 2018-03-20 NOTE — PROGRESS NOTES
[facility]   [address] [facility], [address] [place] [address] Road                                          NEW PATIENT HPI/FOLLOW-UP      NAME:  Nehemias Senior.   :   1945   MRN:   [id number]   PCP:  Abelardo Aase, MD           Subjective: The patient is a 74y.o. year old male with h/o HTN, CAD, s/p PTCA, carotid sinus syndrome, chest pain HTN, Dyslipidemia, DJD and right lumbar radiculopathy who returns for a routine follow-up. Since the last visit, patient reports left flank pain x 3 months. Worst when moving from a laying position to standing. Tylenol, Motrin and BC powders moderate the pain. He questions if the new lipid lowering regimen he is on is to blame. There is no radiation of the pain. There is no hematuria, dysuria or frequency of urination. Pt reports no new cardiac symptoms. Denies change in exercise tolerance, chest pain, edema, medication intolerance, palpitations, shortness of breath, PND/orthopnea wheezing, sputum, syncope, dizziness or light headedness. Doing satisfactorily. Review of Systems  General: Pt denies excessive weight gain or loss. Pt is able to conduct ADL's. Respiratory: Denies shortness of breath, ÁLVAREZ, wheezing or stridor.   Cardiovascular: Denies precordial pain, palpitations, edema or PND  Gastrointestinal: Denies poor appetite, indigestion, abdominal pain or blood in stool  Peripheral vascular: Denies claudication, leg cramps  Neuropsychiatric: Denies paresthesias,tingling,numbness,anxiety,depression,fatigue  Musculoskeletal: +pain,tenderness, Denies soreness,swelling      Past Medical History:   Diagnosis Date    Asthma     hx asthma - no attack in 40+ yrs    CAD (coronary artery disease)     Chest pain, unspecified     Chest pain, unspecified     Chest pain, unspecified     Coronary atherosclerosis of native coronary artery 2012    stent    Coronary atherosclerosis of native coronary artery     Essential hypertension     Essential hypertension, benign     Lumbar spinal stenosis 08/03/2013    SPINE LUMBAR LAMINECTOMY WITH INSTRUMENTATION - p tamiko    Mixed hyperlipidemia     Other ill-defined conditions(799.89)     HIGH CHOLESTEROL    Other malaise and fatigue     Pure hypercholesterolemia      Patient Active Problem List    Diagnosis Date Noted    Drug therapy 06/02/2015    Fatigue 05/29/2015    Dizziness 04/03/2014    Encephalopathy, unspecified 08/13/2013    Intractable pain 08/03/2013    Right lumbar radiculopathy 08/03/2013    S/P PTCA (percutaneous transluminal coronary angioplasty) 08/03/2013    Chest pain, unspecified 03/05/2013    Essential hypertension, benign 03/05/2013    Dyslipidemia 03/05/2013    Esophageal reflux 03/05/2013    DJD (degenerative joint disease) 07/17/2012    Postsurgical percutaneous transluminal coronary angioplasty status 07/17/2012    Coronary atherosclerosis of native coronary artery 07/17/2012    Carotid sinus syndrome 07/17/2012      Past Surgical History:   Procedure Laterality Date    COLONOSCOPY N/A 1/17/2018    COLONOSCOPY performed by Cheo Brown MD at [facility] ENDOSCOPY    HX [facility]  X2    HX GI      COLONOSCOPY    HX HEART CATHETERIZATION      STENT (DR Jb Hamilton)    HX LUMBAR LAMINECTOMY  8/2013    HX PTCA       Allergies   Allergen Reactions    Statins-Hmg-Coa Reductase Inhibitors Other (comments)     Body aches      Family History   Problem Relation Age of Onset    Cancer Mother      female   Delona Face Anesth Problems Neg Hx       Social History     Social History    Marital status:      Spouse name: N/A    Number of children: N/A    Years of education: N/A     Occupational History    Not on file.      Social History Main Topics    Smoking status: Never Smoker    Smokeless tobacco: Never Used    Alcohol use No    Drug use: No    Sexual activity: Yes     Partners: Female     Other Topics Concern  Not on file     Social History Narrative    Medical History: BPHEDelevated PSACholesterolHypertensionMRI 2011 leftw bonnie convexity along the mid to low er lumbar spine    and a rightw bonnie convexity along the thoracolumbar junction. 2 the patient has multilevel degenerative disc disease. 3 there is moderate spinal    stenosis at L3-4 and L4-5 levels. In addition there is facet arthropathy and asymmetrical disc protrusions at L3-4 and L4-5 levels. There is a    narrow ing of the lateral recesses w ith the potential for extrinsic compression upon the origins of the L4 and the L5 nerve roots respectivelyCT    abdomen 2003 malrotation left kidney otherw ise normalCarotid sinus syndrome    Surgical History: colonoscopy by Brad Wade M.D. [id number] quiles md [id number] S1 laminectomy 2013    Hospitalization/Major Diagnostic Procedure: prolonged syncop'e 06    Family History: Mother: , cancerFather: , unknow nSister(s): aliveBrother(s): aliveDaughter(s): aliveGrandmother:    , natural causesGrandfather: , natural causes1 sister(s) - healthy. 3 son(s) , 3 daughter(s) . Social History: Alcohol Use Patient does not use alcohol. Smoking Status Patient is a never smoker. Caffeine: per day, coffee, soda, tea. Marital Status: . Lives w ith: alone. Children: yes, children. 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 Current Outpatient Prescriptions   Medication Sig    omeprazole (PRILOSEC) 20 mg capsule     fenofibrate nanocrystallized (TRICOR) 48 mg tablet TAKE ONE TABLET BY MOUTH ONCE DAILY    lisinopril-hydroCHLOROthiazide (PRINZIDE, ZESTORETIC) 20-12.5 mg per tablet TAKE ONE TABLET BY MOUTH ONCE DAILY    cholestyramine-sucrose (QUESTRAN) 4 gram powder Take 4 g by mouth two (2) times daily (with meals).  DOCUSATE CALCIUM (STOOL SOFTENER PO) Take 100 mg by mouth as needed.     red yeast rice extract 600 mg cap Take 600 mg by mouth daily.  aspirin delayed-release 81 mg tablet Take 81 mg by mouth daily.  ibuprofen (MOTRIN) 200 mg tablet Take  by mouth daily as needed.  flaxseed 1,000 mg cap Take 1 Cap by mouth daily.  MULTIVITAMIN (MULTIPLE VITAMIN PO) Take  by mouth daily. No current facility-administered medications for this visit. I have reviewed the nurses notes, vitals, problem list, allergy list, medical history, family medical, social history and medications. Objective:     Physical Exam:     Vitals:    03/20/18 1050 03/20/18 1052   BP: 121/82 116/78   Pulse: (!) 59 (!) 56   Resp: 12    SpO2: 99%    Weight: 158 lb (71.7 kg)    Height: 5' 6\" (1.676 m)     Body mass index is 25.5 kg/(m^2). General: WDWN elderly male, in no acute distress. Pleasant affect. HEENT: No carotid bruits, no JVD, trach is midline. Heart:  Normal S1/S2 negative S3 or S4. Regular, no murmur, gallop or rub.   Respiratory: Clear bilaterally, no wheezing or rales  Abdomen:   Soft, non-tender, bowel sounds are active.   Extremities:  No edema, normal cap refill, no cyanosis. Neuro: A&Ox3, speech clear, gait stable; moderate tenderness left lower longissimus back muscle area   Skin: Skin color is normal. No rashes or lesions. No diaphoresis. Vascular: 2+ pulses symmetric in all extremities        Data Review:       Cardiographics:    EKG interpretation:  Rhythm: normal sinus rhythm; and regular . Rate (approx.): 55; Axis: leftward axis; P wave: normal; QRS interval: normal ; ST/T wave: normal; Low voltage in  Lead aVF. This EKG was interpreted by Sherwin Gonzalez PA-C.         Cardiology Labs:    Results for orders placed or performed during the hospital encounter of 08/03/13   EKG, 12 LEAD, INITIAL   Result Value Ref Range    Ventricular Rate 102 BPM    Atrial Rate 102 BPM    P-R Interval 150 ms    QRS Duration 84 ms    Q-T Interval 336 ms    QTC Calculation (Bezet) 437 ms    Calculated P Axis 64 degrees Calculated R Axis -7 degrees    Calculated T Axis 53 degrees    Diagnosis       Sinus tachycardia  When compared with ECG of 04-AUG-2013 23:45,  Vent. rate has increased BY  44 BPM  Nonspecific T wave abnormality no longer evident in Inferior leads  Confirmed by Whit Dove M.D., Rand Capes ([id number]) on 8/9/2013 12:47:22 PM       Lab Results   Component Value Date/Time    Cholesterol, total 192 09/29/2017 01:06 PM    HDL Cholesterol 61 09/29/2017 01:06 PM    LDL, calculated 113 (H) 09/29/2017 01:06 PM    Triglyceride 88 09/29/2017 01:06 PM    CHOL/HDL Ratio 3.2 08/07/2013 04:24 AM       Lab Results   Component Value Date/Time    Sodium 143 09/29/2017 01:06 PM    Potassium 4.1 09/29/2017 01:06 PM    Chloride 104 09/29/2017 01:06 PM    CO2 27 09/29/2017 01:06 PM    Anion gap 7 01/10/2014 05:17 PM    Glucose 85 09/29/2017 01:06 PM    BUN 11 09/29/2017 01:06 PM    Creatinine 1.22 09/29/2017 01:06 PM    BUN/Creatinine ratio 9 (L) 09/29/2017 01:06 PM    GFR est AA 68 09/29/2017 01:06 PM    GFR est non-AA 58 (L) 09/29/2017 01:06 PM    Calcium 9.7 09/29/2017 01:06 PM    Bilirubin, total 0.4 09/29/2017 01:06 PM    AST (SGOT) 23 09/29/2017 01:06 PM    Alk. phosphatase 50 09/29/2017 01:06 PM    Protein, total 7.0 09/29/2017 01:06 PM    Albumin 4.1 09/29/2017 01:06 PM    Globulin 3.5 01/10/2014 05:17 PM    A-G Ratio 1.4 09/29/2017 01:06 PM    ALT (SGPT) 18 09/29/2017 01:06 PM          Assessment:       ICD-10-CM ICD-9-CM    1. Chronic left-sided low back pain with left-sided sciatica M54.42 724.2 AMB POC EKG ROUTINE W/ 12 LEADS, INTER & REP    G89.29 724.3 omeprazole (PRILOSEC) 20 mg capsule     338.29 LIPID PANEL      METABOLIC PANEL, COMPREHENSIVE      CK      cholestyramine-sucrose (QUESTRAN) 4 gram powder   2.  Dyslipidemia E78.5 272.4 AMB POC EKG ROUTINE W/ 12 LEADS, INTER & REP      omeprazole (PRILOSEC) 20 mg capsule      LIPID PANEL      METABOLIC PANEL, COMPREHENSIVE      CK      cholestyramine-sucrose (QUESTRAN) 4 gram powder   3. Atherosclerosis of native coronary artery of native heart without angina pectoris I25.10 414.01 AMB POC EKG ROUTINE W/ 12 LEADS, INTER & REP      omeprazole (PRILOSEC) 20 mg capsule      LIPID PANEL      METABOLIC PANEL, COMPREHENSIVE      CK      cholestyramine-sucrose (QUESTRAN) 4 gram powder   4. Essential hypertension, benign I10 401.1 AMB POC EKG ROUTINE W/ 12 LEADS, INTER & REP      omeprazole (PRILOSEC) 20 mg capsule      LIPID PANEL      METABOLIC PANEL, COMPREHENSIVE      CK      cholestyramine-sucrose (QUESTRAN) 4 gram powder   5. S/P PTCA (percutaneous transluminal coronary angioplasty) Z98.61 V45.82 AMB POC EKG ROUTINE W/ 12 LEADS, INTER & REP      omeprazole (PRILOSEC) 20 mg capsule      LIPID PANEL      METABOLIC PANEL, COMPREHENSIVE      CK      cholestyramine-sucrose (QUESTRAN) 4 gram powder   6. Carotid sinus syndrome G90.01 337.01 AMB POC EKG ROUTINE W/ 12 LEADS, INTER & REP      omeprazole (PRILOSEC) 20 mg capsule      LIPID PANEL      METABOLIC PANEL, COMPREHENSIVE      CK      cholestyramine-sucrose (QUESTRAN) 4 gram powder   7. Gastroesophageal reflux disease without esophagitis K21.9 530.81 AMB POC EKG ROUTINE W/ 12 LEADS, INTER & REP      omeprazole (PRILOSEC) 20 mg capsule      LIPID PANEL      METABOLIC PANEL, COMPREHENSIVE      CK      cholestyramine-sucrose (QUESTRAN) 4 gram powder         Discussion: Patient presents at this time stable from a cardiac perspective. BP well controlled. Pt's left flank area tenderness/pain is likely musculoskeletal in origin given no CVA tenderness, no urinary complaints and pain/tenderness is exacerbated by moving positions. Do not think it is related to cholesterol meds. Will refer back to PCP. Pleased with present cardiac status. Discussed/seen with Dr. Naomi Suarez: 1. Continue same meds. 2. Lipid profile and labs followed by this office. -- Will get updated labs    3. Encouraged to exercise to tolerance, lose weight, stop smoking and follow low fat, low cholesterol, low sodium predominantly Plant-based (consider Mediterranean) diet. 4.Follow up: 6 months   --  Call with questions or concerns. -- Will follow up any test results by phone and/or f/u here in office if needed. .        I have discussed the diagnosis with the patient and the intended plan as seen in the above orders. The patient has received an after-visit summary and questions were answered concerning future plans. I have discussed any concerning medication side effects and warnings with the patient as well. Patient seen and examined. All pertinent data reviewed. I have reviewed detailed note as outlined by Pro Lowry. Case discussed with Nursing/medical assistant staff and rPo Lowry. Patient presents with chronic intermittent left flank tenderness/pain exacerbated by movement. Appears to be MS in origin. Reassured not cardiac or med related. Advised f/u with PCP to discuss further options. Plans as outlined. Dina Lucio, PA-C  3/20/2018     LISANDRA LARSON MD,FACC. [facility]

## 2018-03-21 LAB
ALBUMIN SERPL-MCNC: 4.8 G/DL (ref 3.5–4.8)
ALBUMIN/GLOB SERPL: 2 {RATIO} (ref 1.2–2.2)
ALP SERPL-CCNC: 57 IU/L (ref 39–117)
ALT SERPL-CCNC: 48 IU/L (ref 0–44)
AST SERPL-CCNC: 31 IU/L (ref 0–40)
BILIRUB SERPL-MCNC: 0.5 MG/DL (ref 0–1.2)
BUN SERPL-MCNC: 21 MG/DL (ref 8–27)
BUN/CREAT SERPL: 14 (ref 10–24)
CALCIUM SERPL-MCNC: 9.8 MG/DL (ref 8.6–10.2)
CHLORIDE SERPL-SCNC: 102 MMOL/L (ref 96–106)
CHOLEST SERPL-MCNC: 232 MG/DL (ref 100–199)
CK SERPL-CCNC: 569 U/L (ref 24–204)
CO2 SERPL-SCNC: 26 MMOL/L (ref 18–29)
CREAT SERPL-MCNC: 1.45 MG/DL (ref 0.76–1.27)
GFR SERPLBLD CREATININE-BSD FMLA CKD-EPI: 47 ML/MIN/1.73
GFR SERPLBLD CREATININE-BSD FMLA CKD-EPI: 54 ML/MIN/1.73
GLOBULIN SER CALC-MCNC: 2.4 G/DL (ref 1.5–4.5)
GLUCOSE SERPL-MCNC: 89 MG/DL (ref 65–99)
HDLC SERPL-MCNC: 59 MG/DL
LDLC SERPL CALC-MCNC: 156 MG/DL (ref 0–99)
POTASSIUM SERPL-SCNC: 4.4 MMOL/L (ref 3.5–5.2)
PROT SERPL-MCNC: 7.2 G/DL (ref 6–8.5)
SODIUM SERPL-SCNC: 141 MMOL/L (ref 134–144)
TRIGL SERPL-MCNC: 87 MG/DL (ref 0–149)
VLDLC SERPL CALC-MCNC: 17 MG/DL (ref 5–40)

## 2018-03-21 NOTE — PROGRESS NOTES
CK and total cholesterol elevated. But pretty stable when compared with prior results. May need further dietary guidance, cannot tolerate statins.

## 2018-03-22 ENCOUNTER — TELEPHONE (OUTPATIENT)
Dept: CARDIOLOGY CLINIC | Age: 73
End: 2018-03-22

## 2018-03-22 DIAGNOSIS — E78.5 DYSLIPIDEMIA: Primary | ICD-10-CM

## 2018-03-22 NOTE — TELEPHONE ENCOUNTER
Spoke with pt . Verified 2 identifers. Told pt per Zen Kumra : total cholesterol (232) and LDL cholesterol (156) are both elevated. As is the CK (569) - a measurement of skeletal muscle breakdown and he has high liver enzymes. Per Dr. Leslie Odonnell, pt should stop Tricor and continue the Questran powder. repeat labs in one month. Provider has placed orders already. pt to be fasting when completing the labs. Patient verbalize understanding .

## 2018-03-23 ENCOUNTER — OFFICE VISIT (OUTPATIENT)
Dept: INTERNAL MEDICINE CLINIC | Age: 73
End: 2018-03-23

## 2018-03-23 VITALS
TEMPERATURE: 97.7 F | BODY MASS INDEX: 25.58 KG/M2 | HEIGHT: 66 IN | SYSTOLIC BLOOD PRESSURE: 114 MMHG | OXYGEN SATURATION: 95 % | HEART RATE: 60 BPM | WEIGHT: 159.2 LBS | RESPIRATION RATE: 16 BRPM | DIASTOLIC BLOOD PRESSURE: 75 MMHG

## 2018-03-23 DIAGNOSIS — Z00.00 MEDICARE ANNUAL WELLNESS VISIT, SUBSEQUENT: ICD-10-CM

## 2018-03-23 DIAGNOSIS — M54.42 CHRONIC LEFT-SIDED LOW BACK PAIN WITH LEFT-SIDED SCIATICA: ICD-10-CM

## 2018-03-23 DIAGNOSIS — G90.01 CAROTID SINUS SYNDROME: ICD-10-CM

## 2018-03-23 DIAGNOSIS — Z98.890 S/P COLONOSCOPY: ICD-10-CM

## 2018-03-23 DIAGNOSIS — M54.16 RIGHT LUMBAR RADICULOPATHY: ICD-10-CM

## 2018-03-23 DIAGNOSIS — Z13.39 SCREENING FOR ALCOHOLISM: ICD-10-CM

## 2018-03-23 DIAGNOSIS — Z13.31 SCREENING FOR DEPRESSION: ICD-10-CM

## 2018-03-23 DIAGNOSIS — I25.10 ATHEROSCLEROSIS OF NATIVE CORONARY ARTERY OF NATIVE HEART WITHOUT ANGINA PECTORIS: ICD-10-CM

## 2018-03-23 DIAGNOSIS — I10 ESSENTIAL HYPERTENSION, BENIGN: ICD-10-CM

## 2018-03-23 DIAGNOSIS — K21.9 GASTROESOPHAGEAL REFLUX DISEASE WITHOUT ESOPHAGITIS: ICD-10-CM

## 2018-03-23 DIAGNOSIS — G89.29 CHRONIC LEFT-SIDED LOW BACK PAIN WITH LEFT-SIDED SCIATICA: ICD-10-CM

## 2018-03-23 DIAGNOSIS — M15.9 PRIMARY OSTEOARTHRITIS INVOLVING MULTIPLE JOINTS: Primary | ICD-10-CM

## 2018-03-23 NOTE — PATIENT INSTRUCTIONS

## 2018-03-23 NOTE — PROGRESS NOTES
1. Have you been to the ER, urgent care clinic since your last visit? Hospitalized since your last visit? No    2. Have you seen or consulted any other health care providers outside of the [address] [place] [address] since your last visit? Include any pap smears or colon screening. No     Complaints of back pain      This is the Subsequent Medicare Annual Wellness Exam, performed 12 months or more after the Initial AWV or the last Subsequent AWV    I have reviewed the patient's medical history in detail and updated the computerized patient record. History     Past Medical History:   Diagnosis Date    Asthma     hx asthma - no attack in 40+ yrs    CAD (coronary artery disease)     Chest pain, unspecified     Chest pain, unspecified     Chest pain, unspecified     Coronary atherosclerosis of native coronary artery 07/17/2012    stent    Coronary atherosclerosis of native coronary artery     Essential hypertension     Essential hypertension, benign     Lumbar spinal stenosis 08/03/2013    SPINE LUMBAR LAMINECTOMY WITH INSTRUMENTATION - p tamiko    Mixed hyperlipidemia     Other ill-defined conditions(799.89)     HIGH CHOLESTEROL    Other malaise and fatigue     Pure hypercholesterolemia       Past Surgical History:   Procedure Laterality Date    COLONOSCOPY N/A 1/17/2018    COLONOSCOPY performed by Mallory Laughlin MD at [address] [facility]  X2    HX GI      COLONOSCOPY    HX HEART CATHETERIZATION      STENT (DR LARSON)    HX LUMBAR LAMINECTOMY  8/2013    HX PTCA       Current Outpatient Prescriptions   Medication Sig Dispense Refill    omeprazole (PRILOSEC) 20 mg capsule       cholestyramine-sucrose (QUESTRAN) 4 gram powder Take 4 g by mouth two (2) times daily (with meals).  30 g 11    fenofibrate nanocrystallized (TRICOR) 48 mg tablet TAKE ONE TABLET BY MOUTH ONCE DAILY 90 Tab 3    DOCUSATE CALCIUM (STOOL SOFTENER PO) Take 100 mg by mouth as needed.  MULTIVITAMIN (MULTIPLE VITAMIN PO) Take  by mouth daily.  red yeast rice extract 600 mg cap Take 600 mg by mouth daily.  aspirin delayed-release 81 mg tablet Take 81 mg by mouth daily.  ibuprofen (MOTRIN) 200 mg tablet Take  by mouth daily as needed.  flaxseed 1,000 mg cap Take 1 Cap by mouth daily.  lisinopril-hydroCHLOROthiazide (PRINZIDE, ZESTORETIC) 20-12.5 mg per tablet TAKE ONE TABLET BY MOUTH ONCE DAILY 90 Tab 1     Allergies   Allergen Reactions    Statins-Hmg-Coa Reductase Inhibitors Other (comments)     Body aches     Family History   Problem Relation Age of Onset    Cancer Mother      female   [facility] Anesth Problems Neg Hx      Social History   Substance Use Topics    Smoking status: Never Smoker    Smokeless tobacco: Never Used    Alcohol use No     Patient Active Problem List   Diagnosis Code    DJD (degenerative joint disease) M19.90    Postsurgical percutaneous transluminal coronary angioplasty status Z98.61    Coronary atherosclerosis of native coronary artery I25.10    Carotid sinus syndrome G90.01    Chest pain, unspecified R07.9    Essential hypertension, benign I10    Dyslipidemia E78.5    Esophageal reflux K21.9    Intractable pain R52    Right lumbar radiculopathy M54.16    S/P PTCA (percutaneous transluminal coronary angioplasty) Z98.61    Encephalopathy, unspecified G93.40    Dizziness R42    Fatigue R53.83    Drug therapy Z79.899    Chronic left-sided low back pain with sciatica M54.40, G89.29       Depression Risk Factor Screening:     PHQ over the last two weeks 3/23/2018   Little interest or pleasure in doing things Not at all   Feeling down, depressed or hopeless Not at all   Total Score PHQ 2 0     Alcohol Risk Factor Screening: You do not drink alcohol or very rarely. Functional Ability and Level of Safety:   Hearing Loss  Hearing is good. The patient needs further evaluation.     Activities of Daily Living  The home contains: no safety equipment. Patient does total self care    Fall Risk  Fall Risk Assessment, last 12 mths 3/23/2018   Able to walk? Yes   Fall in past 12 months? No       Abuse Screen  Patient is not abused    Cognitive Screening   Evaluation of Cognitive Function:  Has your family/caregiver stated any concerns about your memory: no  Normal    Patient Care Team   Patient Care Team:  Stewart Bill MD as PCP - General (Internal Medicine)  Luc Carlisle MD as Physician (Cardiology)    Assessment/Plan   Education and counseling provided:  Are appropriate based on today's review and evaluation    Diagnoses and all orders for this visit:    1. Medicare annual wellness visit, subsequent    2. Screening for alcoholism  -     Annual  Alcohol Screen 15 min ()    3. Screening for depression  -     Depression Screen Annual    Other orders  -     REFERRAL TO [id number] [place] [address] Due   Topic Date Due    GLAUCOMA SCREENING Q2Y  10/04/2008    MEDICARE YEARLY EXAM  03/14/2018     [facility]  Tash Hopson MD, [address] [place] [address],[address] [address]  Phone:  [phone]  Fax: [phone]      Chief Complaint   Patient presents with    Annual Wellness Visit         SUBECTIVE:    Bolivar Gan is a 74 y.o. male Patient returns today with known history of coronary artery disease, and is followed by Dr. Davide Bansal, who he saw in March, earlier this month, with coronary artery disease status post PTCA, carotid sinus syndrome, and was stable from a cardiac perspective. He noted his blood pressure was well controlled and he had some left flank pain at the time that was felt to be musculoskeletal in origin. He wanted us to address his cholesterol.   Since we last saw him he was also seen by Dr. Chandrakant Santiago, who performed a colonoscopy on January 17th with the findings of diverticulosis and otherwise unremarkable, and upper endoscopy on the same date for abdominal pain with the findings of reflux esophagitis, acute superficial gastritis without hemorrhage and a duodenal stricture. Patient comes in today for evaluation. Patient had discomfort in his back, but over the past two weeks it seems to be better, and he is seen for evaluation. Current Outpatient Prescriptions   Medication Sig Dispense Refill    omeprazole (PRILOSEC) 20 mg capsule       cholestyramine-sucrose (QUESTRAN) 4 gram powder Take 4 g by mouth two (2) times daily (with meals). 30 g 11    fenofibrate nanocrystallized (TRICOR) 48 mg tablet TAKE ONE TABLET BY MOUTH ONCE DAILY 90 Tab 3    DOCUSATE CALCIUM (STOOL SOFTENER PO) Take 100 mg by mouth as needed.  MULTIVITAMIN (MULTIPLE VITAMIN PO) Take  by mouth daily.  red yeast rice extract 600 mg cap Take 600 mg by mouth daily.  aspirin delayed-release 81 mg tablet Take 81 mg by mouth daily.  ibuprofen (MOTRIN) 200 mg tablet Take  by mouth daily as needed.  flaxseed 1,000 mg cap Take 1 Cap by mouth daily.       lisinopril-hydroCHLOROthiazide (PRINZIDE, ZESTORETIC) 20-12.5 mg per tablet TAKE ONE TABLET BY MOUTH ONCE DAILY 80 Tab 1     Past Medical History:   Diagnosis Date    Asthma     hx asthma - no attack in 40+ yrs    CAD (coronary artery disease)     Chest pain, unspecified     Chest pain, unspecified     Chest pain, unspecified     Coronary atherosclerosis of native coronary artery 07/17/2012    stent    Coronary atherosclerosis of native coronary artery     Essential hypertension     Essential hypertension, benign     Lumbar spinal stenosis 08/03/2013    SPINE LUMBAR LAMINECTOMY WITH INSTRUMENTATION - p tamiko    Mixed hyperlipidemia     Other ill-defined conditions(799.89)     HIGH CHOLESTEROL    Other malaise and fatigue     Pure hypercholesterolemia     S/P colonoscopy 01/17/2018    md hanh tics     Past Surgical History:   Procedure Laterality Date    COLONOSCOPY N/A 1/17/2018    COLONOSCOPY performed by Ryder Franklin MD at [address] HX BACK SURGERY      THORACIC SPINE SURGERY  X2    HX GI      COLONOSCOPY    HX HEART CATHETERIZATION      STENT (DR LARSON)    HX LUMBAR LAMINECTOMY  2013    HX PTCA       Allergies   Allergen Reactions    Statins-Hmg-Coa Reductase Inhibitors Other (comments)     Body aches       REVIEW OF SYSTEMS:   There has been no chest pain or shortness of breath. Social History     Social History    Marital status:      Spouse name: N/A    Number of children: N/A    Years of education: N/A     Social History Main Topics    Smoking status: Never Smoker    Smokeless tobacco: Never Used    Alcohol use No    Drug use: No    Sexual activity: Not Currently     Partners: Female     Other Topics Concern    None     Social History Narrative    Medical History: BPHEDelevated PSACholesterolHypertensionMRI 2011 leftw bonnie convexity along the mid to low er lumbar spine    and a rightw bonnie convexity along the thoracolumbar junction. 2 the patient has multilevel degenerative disc disease. 3 there is moderate spinal    stenosis at L3-4 and L4-5 levels. In addition there is facet arthropathy and asymmetrical disc protrusions at L3-4 and L4-5 levels. There is a    narrow ing of the lateral recesses w ith the potential for extrinsic compression upon the origins of the L4 and the L5 nerve roots respectivelyCT    abdomen 2003 malrotation left kidney otherw ise normalCarotid sinus syndrome    Surgical History: colonoscopy by Rose Gonzalez M.D. [id number] - md kb [id number] S1 laminectomy 2013    Hospitalization/Major Diagnostic Procedure: prolonged syncop'e 06    Family History: Mother: , cancerFather: , unknow nSister(s): aliveBrother(s): aliveDaughter(s): aliveGrandmother:    , natural causesGrandfather: , natural causes1 sister(s) - healthy. 3 son(s) , 3 daughter(s) . Social History: Alcohol Use Patient does not use alcohol. Smoking Status Patient is a never smoker. Caffeine: per day, coffee, soda, tea. Marital Status: . Lives w ith: alone. Children: yes, children. 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   r  Family History   Problem Relation Age of Onset    Cancer Mother      female    Anesth Problems Neg Hx        OBJECTIVE:  Visit Vitals    /75    Pulse 60    Temp 97.7 °F (36.5 °C) (Oral)    Resp 16    Ht 5' 6\" (1.676 m)    Wt 159 lb 3.2 oz (72.2 kg)    SpO2 95%    BMI 25.7 kg/m2     ENT: perrla,  eom intact  NECK: supple. Thyroid normal  CHEST: clear to ascultation and percussion   HEART: regular rate and rhythm  ABD: soft, bowel sounds active  EXTREMITIES: no edema, pulse 1+     Office Visit on 03/20/2018   Component Date Value Ref Range Status    Cholesterol, total 03/20/2018 232* 100 - 199 mg/dL Final    Triglyceride 03/20/2018 87  0 - 149 mg/dL Final    HDL Cholesterol 03/20/2018 59  >39 mg/dL Final    VLDL, calculated 03/20/2018 17  5 - 40 mg/dL Final    LDL, calculated 03/20/2018 156* 0 - 99 mg/dL Final    Glucose 03/20/2018 89  65 - 99 mg/dL Final    BUN 03/20/2018 21  8 - 27 mg/dL Final    Creatinine 03/20/2018 1.45* 0.76 - 1.27 mg/dL Final    GFR est non-AA 03/20/2018 47* >59 mL/min/1.73 Final    GFR est AA 03/20/2018 54* >59 mL/min/1.73 Final    BUN/Creatinine ratio 03/20/2018 14  10 - 24 Final    Sodium 03/20/2018 141  134 - 144 mmol/L Final    Potassium 03/20/2018 4.4  3.5 - 5.2 mmol/L Final    Chloride 03/20/2018 102  96 - 106 mmol/L Final    CO2 03/20/2018 26  18 - 29 mmol/L Final    Calcium 03/20/2018 9.8  8.6 - 10.2 mg/dL Final    Protein, total 03/20/2018 7.2  6.0 - 8.5 g/dL Final    Albumin 03/20/2018 4.8  3.5 - 4.8 g/dL Final    GLOBULIN, TOTAL 03/20/2018 2.4  1.5 - 4.5 g/dL Final    A-G Ratio 03/20/2018 2.0  1.2 - 2.2 Final    Bilirubin, total 03/20/2018 0.5  0.0 - 1.2 mg/dL Final    Alk. phosphatase 03/20/2018 57  39 - 117 IU/L Final    AST (SGOT) 03/20/2018 31  0 - 40 IU/L Final    ALT (SGPT) 03/20/2018 48* 0 - 44 IU/L Final    Creatine Kinase,Total 03/20/2018 569* 24 - 204 U/L Final   Admission on 01/17/2018, Discharged on 01/17/2018   Component Date Value Ref Range Status    H. pylori from tissue 01/17/2018 Negative  Negative Final    Positive control 01/17/2018 positive   Final    Negative control 01/17/2018 negative   Final    Lot no. 01/17/2018 [id number]   Final          ASSESSMENT:  1. Medicare annual wellness visit, subsequent    2. Screening for alcoholism    3. Screening for depression    4. S/P colonoscopy      He's intolerant to statins manifest by myalgias. He tells me that he's tried different statins, doesn't know the ones he's tried, but is not very willing to try another one because of the myalgias. Therefore we do not make any adjustments in his medications. His blood pressure control is at goal.    His BMI reflects ideal body weight. We encouraged physical activity 30 minutes five days a week and continue his heart healthy lifestyle. He'll return to see us in about six months, sooner if he has any problems. PLAN:  . No orders of the defined types were placed in this encounter. Follow-up Disposition: Not on File      ATTENTION:   This medical record was transcribed using an electronic medical records system. Although proofread, it may and can contain electronic and spelling errors. Other human spelling and other errors may be present. Corrections may be executed at a later time. Please feel free to contact us for any clarifications as needed.

## 2018-03-23 NOTE — MR AVS SNAPSHOT
Senwill Ramses 
 
 
 [address] [id number] 
[phone] Patient: Vanessa Vargas Sr. MRN: [id number] Visit Information Date & Time Provider Department Dept. Phone Encounter #  
 3/23/2018 10:45 AM MD Ti Samaniego [facility] [phone] [id number] Follow-up Instructions Return in about 6 months (around 2018). Follow-up and Disposition History Your Appointments 2018 10:15 AM  
ESTABLISHED PATIENT with Maikol Interiano MD  
[facility]) Appt Note: 6 MO. F/U FOLLOWING LABS  
 [address] [place]  
[phone] [address]  
  
    
 2018  9:00 AM  
Any with Lakshmi Nelson MD  
[address] [place] [address] and Primary Care [facility]) Appt Note: 6 month f/u  
 [address] [facility]  
  
   
 [address] [id number] Upcoming Health Maintenance Date Due  
 GLAUCOMA SCREENING Q2Y 10/4/2008 MEDICARE YEARLY EXAM 3/14/2018 COLONOSCOPY 2028 DTaP/Tdap/Td series (2 - Td) 3/23/2028 Allergies as of 3/23/2018  Review Complete On: 3/23/2018 By: Lakshmi Nelson MD  
  
 Severity Noted Reaction Type Reactions Statins-hmg-coa Reductase Inhibitors  2012    Other (comments) Body aches Current Immunizations  Reviewed on 2013 Name Date Pneumococcal Polysaccharide (PPSV-23) 2013  1:04 PM  
  
 Not reviewed this visit You Were Diagnosed With   
  
 Codes Comments Primary osteoarthritis involving multiple joints    -  Primary ICD-10-CM: M15.0 ICD-9-CM: 715.09 Medicare annual wellness visit, subsequent     ICD-10-CM: Z00.00 ICD-9-CM: V70.0 Screening for alcoholism     ICD-10-CM: Z13.89 ICD-9-CM: V79.1 Screening for depression     ICD-10-CM: Z13.89 ICD-9-CM: V79.0 S/P colonoscopy     ICD-10-CM: Y99.295 ICD-9-CM: V45.89 Right lumbar radiculopathy     ICD-10-CM: M54.16 
ICD-9-CM: 724.4 Atherosclerosis of native coronary artery of native heart without angina pectoris     ICD-10-CM: I25.10 ICD-9-CM: 414.01 Carotid sinus syndrome     ICD-10-CM: G90.01 
ICD-9-CM: 337.01 Essential hypertension, benign     ICD-10-CM: I10 
ICD-9-CM: 401.1 Gastroesophageal reflux disease without esophagitis     ICD-10-CM: K21.9 ICD-9-CM: 530.81 Chronic left-sided low back pain with left-sided sciatica     ICD-10-CM: M54.42, G89.29 ICD-9-CM: 724.2, 724.3, 338.29 Vitals BP Pulse Temp Resp Height(growth percentile) Weight(growth percentile) 114/75 60 97.7 °F (36.5 °C) (Oral) 16 5' 6\" (1.676 m) 159 lb 3.2 oz (72.2 kg) SpO2 BMI Smoking Status 95% 25.7 kg/m2 Never Smoker Vitals History BMI and BSA Data Body Mass Index Body Surface Area 25.7 kg/m 2 1.83 m 2 Preferred Pharmacy Pharmacy Name Phone [address] [place] [phone] Your Updated Medication List  
  
   
This list is accurate as of 3/23/18 12:21 PM.  Always use your most recent med list.  
  
  
  
  
 aspirin delayed-release 81 mg tablet Take 81 mg by mouth daily. cholestyramine-sucrose 4 gram powder Commonly known as:  Roderick Pickles Take 4 g by mouth two (2) times daily (with meals). fenofibrate nanocrystallized 48 mg tablet Commonly known as:  TRICOR  
TAKE ONE TABLET BY MOUTH ONCE DAILY  
  
 flaxseed 1,000 mg Cap Take 1 Cap by mouth daily. ibuprofen 200 mg tablet Commonly known as:  MOTRIN Take  by mouth daily as needed. lisinopril-hydroCHLOROthiazide 20-12.5 mg per tablet Commonly known as:  PRINZIDE, ZESTORETIC  
TAKE ONE TABLET BY MOUTH ONCE DAILY MULTIPLE VITAMIN PO Take  by mouth daily. omeprazole 20 mg capsule Commonly known as:  PRILOSEC  
  
 red yeast rice extract 600 mg Cap Take 600 mg by mouth daily. STOOL SOFTENER PO Take 100 mg by mouth as needed. We Performed the Following Amberly 68 [[id number] [facility]] [place] ANNUAL ALCOHOL SCREEN 15 MIN [id number] [facility]] REFERRAL TO OPHTHALMOLOGY [REF57 Custom] Follow-up Instructions Return in about 6 months (around 9/23/2018). Referral Information Referral ID Referred By Referred To  
  
 [id number] Delayne Maillard E Not Available Visits Status Start Date End Date 1 New Request 3/23/18 3/23/19 If your referral has a status of pending review or denied, additional information will be sent to support the outcome of this decision. Patient Instructions Medicare Wellness Visit, Male The best way to live healthy is to have a healthy lifestyle by eating a well-balanced diet, exercising regularly, limiting alcohol and stopping smoking. Regular physical exams and screening tests are another way to keep healthy. Preventive exams provided by your health care provider can find health problems before they become diseases or illnesses. Preventive services including immunizations, screening tests, monitoring and exams can help you take care of your own health. All people over age 72 should have a pneumovax  and and a prevnar shot to prevent pneumonia. These are once in a lifetime unless you and your provider decide differently. All people over 65 should have a yearly flu shot and a tetanus vaccine every 10 years. Screening for diabetes mellitus with a blood sugar test should be done every year.  
 
Glaucoma is a disease of the eye due to increased ocular pressure that can lead to blindness and it should be done every year by an eye professional. 
 
Cardiovascular screening tests that check for elevated lipids (fatty part of blood) which can lead to heart disease and strokes should be done every 5 years. Colorectal screening that evaluates for blood or polyps in your colon should be done yearly as a stool test or every five years as a flexible sigmoidoscope or every 10 years as a colonoscopy up to age 76. Men up to age 76 may need a screening blood test for prostate cancer at certain intervals, depending on their personal and family history. This decision is between the patient and his provider. If you have been a smoker or had family history of abdominal aortic aneurysms, you and your provider may decide to schedule an ultrasound test of your aorta. Hepatitis C screening is also recommended for anyone born between [birth year] through [address]. A shingles vaccine is also recommended once in a lifetime after age 61. Your Medicare Wellness Exam is recommended annually. Here is a list of your current Health Maintenance items with a due date: 
Health Maintenance Due Topic Date Due  Glaucoma Screening   10/04/2008 [employer] Annual Well Visit  03/14/2018 Introducing [facility]! Jeremy Muñoz introduces [employer] patient portal. Now you can access parts of your medical record, email your doctor's office, and request medication refills online. 1. In your internet browser, go to [email or web address] 2. Click on the First Time User? Click Here link in the Sign In box. You will see the New Member Sign Up page. 3. Enter your [employer] Access Code exactly as it appears below. You will not need to use this code after youve completed the sign-up process. If you do not sign up before the expiration date, you must request a new code. · [employer] Access Code: [email or web address] Expires: 4/17/2018  2:48 PM 
 
4. Enter the last four digits of your Social Security Number (xxxx) and Date of Birth (mm/dd/yyyy) as indicated and click Submit. You will be taken to the next sign-up page. 5. Create a [employer] ID. This will be your [employer] login ID and cannot be changed, so think of one that is secure and easy to remember. 6. Create a [employer] password. You can change your password at any time. 7. Enter your Password Reset Question and Answer. This can be used at a later time if you forget your password. 8. Enter your e-mail address. You will receive e-mail notification when new information is available in [address] 9. Click Sign Up. You can now view and download portions of your medical record. 10. Click the Download Summary menu link to download a portable copy of your medical information. If you have questions, please visit the Frequently Asked Questions section of the [employer] website. Remember, [employer] is NOT to be used for urgent needs. For medical emergencies, dial 911. Now available from your iPhone and Android! Please provide this summary of care documentation to your next provider. Your primary care clinician is listed as Juliette Jackson. If you have any questions after today's visit, please call [phone].

## 2018-04-02 ENCOUNTER — TELEPHONE (OUTPATIENT)
Dept: CARDIOLOGY CLINIC | Age: 73
End: 2018-04-02

## 2018-04-02 NOTE — TELEPHONE ENCOUNTER
Spoke with pt . Verified 2 identifers. Pt states Questran powder is causing him back pain and muscle and and states he is miserable and wants to know is there something else he can take in replace of it. Pt wants call back at [phone]. told pt I would forward to provider for response and return his call. Patient verbalize understanding .

## 2018-04-03 NOTE — TELEPHONE ENCOUNTER
Spoke with pt . Verified 2 identifers. told pt per :STOP Qustran medication, Repatha too expensive last md believe he spoke about that med. WELCHOL 600 MG TID--asked pt if he tried pt  Pt stated no but will like to try told pt I would relay and return call Patient verbalize understanding .

## 2018-04-05 DIAGNOSIS — I25.10 ATHEROSCLEROSIS OF NATIVE CORONARY ARTERY OF NATIVE HEART WITHOUT ANGINA PECTORIS: ICD-10-CM

## 2018-04-05 DIAGNOSIS — I10 ESSENTIAL HYPERTENSION, BENIGN: Primary | ICD-10-CM

## 2018-04-05 DIAGNOSIS — Z98.61 S/P PTCA (PERCUTANEOUS TRANSLUMINAL CORONARY ANGIOPLASTY): ICD-10-CM

## 2018-04-05 DIAGNOSIS — E78.5 DYSLIPIDEMIA: ICD-10-CM

## 2018-04-05 RX ORDER — COLESEVELAM 180 1/1
1875 TABLET ORAL 2 TIMES DAILY WITH MEALS
Qty: 180 TAB | Refills: 6 | Status: SHIPPED | OUTPATIENT
Start: 2018-04-05 | End: 2019-02-04 | Stop reason: SDUPTHER

## 2018-04-06 ENCOUNTER — TELEPHONE (OUTPATIENT)
Dept: CARDIOLOGY CLINIC | Age: 73
End: 2018-04-06

## 2018-04-06 NOTE — TELEPHONE ENCOUNTER
Spoke with pt . Verified 2 identifers. told pt per Melina. 49 order sent to pharmacy yesterday for welchol 625mg tab and lipids lab to be done in 3 months . Patient verbalize understanding .

## 2018-06-25 ENCOUNTER — OFFICE VISIT (OUTPATIENT)
Dept: INTERNAL MEDICINE CLINIC | Age: 73
End: 2018-06-25

## 2018-06-25 VITALS
HEIGHT: 66 IN | HEART RATE: 58 BPM | DIASTOLIC BLOOD PRESSURE: 86 MMHG | TEMPERATURE: 97.6 F | OXYGEN SATURATION: 96 % | RESPIRATION RATE: 16 BRPM | BODY MASS INDEX: 26.66 KG/M2 | WEIGHT: 165.9 LBS | SYSTOLIC BLOOD PRESSURE: 150 MMHG

## 2018-06-25 DIAGNOSIS — I10 ESSENTIAL HYPERTENSION, BENIGN: Primary | ICD-10-CM

## 2018-06-25 DIAGNOSIS — M15.9 PRIMARY OSTEOARTHRITIS INVOLVING MULTIPLE JOINTS: ICD-10-CM

## 2018-06-25 DIAGNOSIS — Z71.89 ACP (ADVANCE CARE PLANNING): ICD-10-CM

## 2018-06-25 DIAGNOSIS — Z98.61 S/P PTCA (PERCUTANEOUS TRANSLUMINAL CORONARY ANGIOPLASTY): ICD-10-CM

## 2018-06-25 DIAGNOSIS — R53.1 WEAKNESS: ICD-10-CM

## 2018-06-25 DIAGNOSIS — M54.16 RIGHT LUMBAR RADICULOPATHY: ICD-10-CM

## 2018-06-25 RX ORDER — CELECOXIB 200 MG/1
200 CAPSULE ORAL 2 TIMES DAILY
Qty: 60 CAP | Refills: 2 | Status: SHIPPED | OUTPATIENT
Start: 2018-06-25 | End: 2018-09-23

## 2018-06-25 RX ORDER — METHOCARBAMOL 750 MG/1
750 TABLET, FILM COATED ORAL 4 TIMES DAILY
Qty: 100 TAB | Refills: 2 | Status: SHIPPED | OUTPATIENT
Start: 2018-06-25 | End: 2018-06-26 | Stop reason: CLARIF

## 2018-06-25 NOTE — PROGRESS NOTES
SPORTS MEDICINE AND PRIMARY CARE  Arcenio Sanchez MD, [address] [address]  Phone:  [phone]  Fax: [phone]       Chief Complaint   Patient presents with    Hearing Problem     Patient states that he has been having trouble hearing in his right ear.  Back Pain   . SUBJECTIVE:    Mitchell Choi is a 74 y.o. male Patient returns today with known history of lumbar radiculopathy, coronary artery disease, primary hypertension, S/P lumbar laminectomy instrumentation by León Frank for lumbar spinal stenosis 08/03/13, dyslipidemia,  and is seen for evaluation. Patient complains of low back pain without radiation. No history of trauma to his back. He also complains of loss of hearing in his right ear and thinks it's filled with wax. Patient is seen for evaluation. Current Outpatient Prescriptions   Medication Sig Dispense Refill    celecoxib (CELEBREX) 200 mg capsule Take 1 Cap by mouth two (2) times a day for 90 days. 60 Cap 2    methocarbamol (ROBAXIN) 750 mg tablet Take 1 Tab by mouth four (4) times daily. 100 Tab 2    colesevelam (WELCHOL) 625 mg tablet Take 3 Tabs by mouth two (2) times daily (with meals). 180 Tab 6    omeprazole (PRILOSEC) 20 mg capsule Take 20 mg by mouth daily.  fenofibrate nanocrystallized (TRICOR) 48 mg tablet TAKE ONE TABLET BY MOUTH ONCE DAILY 90 Tab 3    DOCUSATE CALCIUM (STOOL SOFTENER PO) Take 100 mg by mouth as needed.  MULTIVITAMIN (MULTIPLE VITAMIN PO) Take  by mouth daily.  red yeast rice extract 600 mg cap Take 600 mg by mouth daily.  aspirin delayed-release 81 mg tablet Take 81 mg by mouth daily.  flaxseed 1,000 mg cap Take 1 Cap by mouth daily.       lisinopril-hydroCHLOROthiazide (PRINZIDE, ZESTORETIC) 20-12.5 mg per tablet TAKE ONE TABLET BY MOUTH ONCE DAILY 90 Tab 1     Past Medical History:   Diagnosis Date    Asthma     hx asthma - no attack in 40+ yrs    Back pain     CAD (coronary artery disease)     Chest pain, unspecified     Chest pain, unspecified     Chest pain, unspecified     Coronary atherosclerosis of native coronary artery 07/17/2012    stent    Coronary atherosclerosis of native coronary artery     Essential hypertension     Essential hypertension, benign     Lumbar spinal stenosis 08/03/2013    SPINE LUMBAR LAMINECTOMY WITH INSTRUMENTATION - p tamiko    Mixed hyperlipidemia     Other ill-defined conditions(799.89)     HIGH CHOLESTEROL    Other malaise and fatigue     Pure hypercholesterolemia     S/P colonoscopy 01/17/2018    md hanh tics     Past Surgical History:   Procedure Laterality Date    COLONOSCOPY N/A 1/17/2018    COLONOSCOPY performed by Becki Parsons MD at [facility] ENDOSCOPY    HX [facility]  X2    HX GI      COLONOSCOPY    HX HEART CATHETERIZATION      STENT (DR Warren Hopper)    HX LUMBAR LAMINECTOMY  8/2013    HX PTCA       Allergies   Allergen Reactions    Statins-Hmg-Coa Reductase Inhibitors Other (comments)     Body aches         REVIEW OF SYSTEMS:  General: negative for - chills or fever  ENT: negative for - headaches, nasal congestion or tinnitus  Respiratory: negative for - cough, hemoptysis, shortness of breath or wheezing  Cardiovascular : negative for - chest pain, edema, palpitations or shortness of breath  Gastrointestinal: negative for - abdominal pain, blood in stools, heartburn or nausea/vomiting  Genito-Urinary: no dysuria, trouble voiding, or hematuria  Musculoskeletal: negative for - gait disturbance, joint pain, joint stiffness or joint swelling  Neurological: no TIA or stroke symptoms  Hematologic: no bruises, no bleeding, no swollen glands  Integument: no lumps, mole changes, nail changes or rash  Endocrine: no malaise/lethargy or unexpected weight changes      Social History     Social History    Marital status:      Spouse name: N/A    Number of children: N/A    Years of education: N/A     Social History Main Topics    Smoking status: Never Smoker    Smokeless tobacco: Never Used    Alcohol use No    Drug use: No    Sexual activity: Not Currently     Partners: Female     Other Topics Concern    None     Social History Narrative    Medical History: BPHEDelevated PSACholesterolHypertensionMRI 2011 leftw bonnie convexity along the mid to low er lumbar spine    and a rightw bonnie convexity along the thoracolumbar junction. 2 the patient has multilevel degenerative disc disease. 3 there is moderate spinal    stenosis at L3-4 and L4-5 levels. In addition there is facet arthropathy and asymmetrical disc protrusions at L3-4 and L4-5 levels. There is a    narrow ing of the lateral recesses w ith the potential for extrinsic compression upon the origins of the L4 and the L5 nerve roots respectivelyCT    abdomen 2003 malrotation left kidney otherw ise normalCarotid sinus syndrome    Surgical History: colonoscopy by Dayday Neil M.D. [id number] quiles md [id number] S1 laminectomy 2013    Hospitalization/Major Diagnostic Procedure: prolonged syncop'e 06    Family History: Mother: , cancerFather: , unknow nSister(s): aliveBrother(s): aliveDaughter(s): aliveGrandmother:    , natural causesGrandfather: , natural causes1 sister(s) - healthy. 3 son(s) , 3 daughter(s) . Social History: Alcohol Use Patient does not use alcohol. Smoking Status Patient is a never smoker. Caffeine: per day, coffee, soda, tea. Marital Status: . Lives w ith: alone. Children: yes, children. 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      Family History   Problem Relation Age of Onset   Jey Fortune Cancer Mother      female    Anesth Problems Neg Hx        OBJECTIVE:    Visit Vitals    /86    Pulse (!) 58    Temp 97.6 °F (36.4 °C) (Oral)    Resp 16    Ht 5' 6\" (1.676 m)    Wt 165 lb 14.4 oz (75.3 kg)    SpO2 96%    BMI 26.78 kg/m2     CONSTITUTIONAL: well , well nourished, appears age appropriate  EYES: perrla, eom intact  ENMT:moist mucous membranes, pharynx clear  NECK: supple. Thyroid normal  RESPIRATORY: Chest: clear bilaterally   CARDIOVASCULAR: Heart: regular rate and rhythm  GASTROINTESTINAL: Abdomen: soft, bowel sounds active  HEMATOLOGIC: no pathological lymph nodes palpated  MUSCULOSKELETAL: Extremities: no edema, pulse 1+   INTEGUMENT: No unusual rashes or suspicious skin lesions noted. Nails appear normal.  NEUROLOGIC: non-focal exam   MENTAL STATUS: alert and oriented, appropriate affect           ASSESSMENT:  1. Essential hypertension, benign    2. Right lumbar radiculopathy    3. Primary osteoarthritis involving multiple joints    4. S/P PTCA (percutaneous transluminal coronary angioplasty)    5. Weakness     6. ACP (advance care planning)      Patient had acute lumbosacral strain. There is no radicular component as he had previously. Will treat him symptomatically with a muscle relaxant, Flexeril, Celebrex and PT. He'll return to the office in about a month. BP elevation is noted and felt to be related to associated pain. No evidence of symptomatic coronary artery disease. PLAN:  .  Orders Placed This Encounter    HEPATITIS PANEL, ACUTE    METABOLIC PANEL, COMPREHENSIVE    REFERRAL TO PHYSICAL THERAPY    celecoxib (CELEBREX) 200 mg capsule    methocarbamol (ROBAXIN) 750 mg tablet       Follow-up Disposition:  Return in about 4 weeks (around 7/23/2018). ATTENTION:   This medical record was transcribed using an electronic medical records system. Although proofread, it may and can contain electronic and spelling errors. Other human spelling and other errors may be present. Corrections may be executed at a later time. Please feel free to contact us for any clarifications as needed.

## 2018-06-25 NOTE — PROGRESS NOTES
Chief Complaint   Patient presents with    Hearing Problem     Patient states that he has been having trouble hearing in his right ear.  Back Pain     1. Have you been to the ER, urgent care clinic since your last visit? Hospitalized since your last visit? Yes When: 4/19/18 [facility] Reason for visit: body aches    2. Have you seen or consulted any other health care providers outside of the [facility] since your last visit? Include any pap smears or colon screening.  No

## 2018-06-25 NOTE — MR AVS SNAPSHOT
[facility] 
 
 
 [address] [address] 
[phone] Patient: Linden Lopez Sr. MRN: [id number] SFT:87/2/9238 Visit Information Date & Time Provider Department Dept. Phone Encounter #  
 6/25/2018  9:30 AM Amaya Bailon 80 [facility] 34 [id number] Follow-up Instructions Return in about 4 weeks (around 7/23/2018). Follow-up and Disposition History Your Appointments 9/25/2018 10:15 AM  
ESTABLISHED PATIENT with MD Kassie Holguin [facility]) Appt Note: 6 MO. F/U FOLLOWING LABS  
 [address]  
[phone] [address]  
  
    
 9/28/2018  9:00 AM  
Any with Nay Arrington MD  
[address] [place] [address] and Primary Care [facility]) Appt Note: 6 month f/u  
 [address] [facility]  
  
   
 [address] [id number] Upcoming Health Maintenance Date Due  
 GLAUCOMA SCREENING Q2Y 10/25/2018* Influenza Age 5 to Adult 8/1/2018 MEDICARE YEARLY EXAM 3/24/2019 COLONOSCOPY 1/17/2028 DTaP/Tdap/Td series (2 - Td) 3/23/2028 *Topic was postponed. The date shown is not the original due date. Allergies as of 6/25/2018  Review Complete On: 6/25/2018 By: Nay Arrington MD  
  
 Severity Noted Reaction Type Reactions Statins-hmg-coa Reductase Inhibitors  07/17/2012    Other (comments) Body aches Current Immunizations  Reviewed on 8/12/2013 Name Date Pneumococcal Polysaccharide (PPSV-23) 8/13/2013  1:04 PM  
  
 Not reviewed this visit You Were Diagnosed With   
  
 Codes Comments Essential hypertension, benign    -  Primary ICD-10-CM: I10 
ICD-9-CM: 401.1 Right lumbar radiculopathy     ICD-10-CM: M54.16 
ICD-9-CM: 724.4 Primary osteoarthritis involving multiple joints     ICD-10-CM: M15.0 ICD-9-CM: 715.09 S/P PTCA (percutaneous transluminal coronary angioplasty)     ICD-10-CM: Z98.61 ICD-9-CM: V45.82 Weakness     ICD-10-CM: R53.1 ICD-9-CM: 780.79 ACP (advance care planning)     ICD-10-CM: Z71.89 ICD-9-CM: V65.49 Vitals BP Pulse Temp Resp Height(growth percentile) Weight(growth percentile) 150/86 (!) 58 97.6 °F (36.4 °C) (Oral) 16 5' 6\" (1.676 m) 165 lb 14.4 oz (75.3 kg) SpO2 BMI Smoking Status 96% 26.78 kg/m2 Never Smoker Vitals History BMI and BSA Data Body Mass Index Body Surface Area  
 26.78 kg/m 2 1.87 m 2 Preferred Pharmacy Pharmacy Name Phone [address] [facility] [address] [place] [address] [phone] Your Updated Medication List  
  
   
This list is accurate as of 6/25/18 12:11 PM.  Always use your most recent med list.  
  
  
  
  
 aspirin delayed-release 81 mg tablet Take 81 mg by mouth daily. celecoxib 200 mg capsule Commonly known as:  CELEBREX Take 1 Cap by mouth two (2) times a day for 90 days. colesevelam 625 mg tablet Commonly known as:  [facility] Take 3 Tabs by mouth two (2) times daily (with meals). fenofibrate nanocrystallized 48 mg tablet Commonly known as:  TRICOR  
TAKE ONE TABLET BY MOUTH ONCE DAILY  
  
 flaxseed 1,000 mg Cap Take 1 Cap by mouth daily. lisinopril-hydroCHLOROthiazide 20-12.5 mg per tablet Commonly known as:  PRINZIDE, ZESTORETIC  
TAKE ONE TABLET BY MOUTH ONCE DAILY  
  
 methocarbamol 750 mg tablet Commonly known as:  ROBAXIN Take 1 Tab by mouth four (4) times daily. MULTIPLE VITAMIN PO Take  by mouth daily. omeprazole 20 mg capsule Commonly known as:  PRILOSEC Take 20 mg by mouth daily. red yeast rice extract 600 mg Cap Take 600 mg by mouth daily. STOOL SOFTENER PO Take 100 mg by mouth as needed. Prescriptions Sent to Pharmacy Refills  
 celecoxib (CELEBREX) 200 mg capsule 2 Sig: Take 1 Cap by mouth two (2) times a day for 90 days. Class: Normal  
 Pharmacy: [facility] [address] [facility],[address], [address] [place] [address] Ph #: [phone] Route: Oral  
 methocarbamol (ROBAXIN) 750 mg tablet 2 Sig: Take 1 Tab by mouth four (4) times daily. Class: Normal  
 Pharmacy: [facility] [address], [address] [place] [address] Ph #: [phone] Route: Oral  
  
We Performed the Following FULL CODE [COD2 Custom] HEPATITIS PANEL, ACUTE [22881 CPT(R)] METABOLIC PANEL, COMPREHENSIVE [20737 CPT(R)] REFERRAL TO PHYSICAL THERAPY [PSD07 Custom] Follow-up Instructions Return in about 4 weeks (around 7/23/2018). Referral Information Referral ID Referred By Referred To  
  
 [id number] Catherine GLOVER Not Available Visits Status Start Date End Date 1 New Request 6/25/18 6/25/19 If your referral has a status of pending review or denied, additional information will be sent to support the outcome of this decision. Introducing [facility]! Katy Senior introduces [employer] patient portal. Now you can access parts of your medical record, email your doctor's office, and request medication refills online. 1. In your internet browser, go to [email or web address] 2. Click on the First Time User? Click Here link in the Sign In box. You will see the New Member Sign Up page. 3. Enter your [employer] Access Code exactly as it appears below. You will not need to use this code after youve completed the sign-up process. If you do not sign up before the expiration date, you must request a new code. · [employer] Access Code: [email or web address] Expires: 9/23/2018 11:42 AM 
 
4. Enter the last four digits of your Social Security Number (xxxx) and Date of Birth (mm/dd/yyyy) as indicated and click Submit.  You will be taken to the next sign-up page. 5. Create a [employer] ID. This will be your [employer] login ID and cannot be changed, so think of one that is secure and easy to remember. 6. Create a [employer] password. You can change your password at any time. 7. Enter your Password Reset Question and Answer. This can be used at a later time if you forget your password. 8. Enter your e-mail address. You will receive e-mail notification when new information is available in [address] 9. Click Sign Up. You can now view and download portions of your medical record. 10. Click the Download Summary menu link to download a portable copy of your medical information. If you have questions, please visit the Frequently Asked Questions section of the [employer] website. Remember, [employer] is NOT to be used for urgent needs. For medical emergencies, dial 911. Now available from your iPhone and Android! Please provide this summary of care documentation to your next provider. Your primary care clinician is listed as Tena Flores. If you have any questions after today's visit, please call [phone].

## 2018-06-26 RX ORDER — TIZANIDINE 4 MG/1
TABLET ORAL
Qty: 60 TAB | Refills: 1 | Status: SHIPPED | OUTPATIENT
Start: 2018-06-26 | End: 2020-04-17

## 2018-07-03 ENCOUNTER — TELEPHONE (OUTPATIENT)
Dept: CARDIOLOGY CLINIC | Age: 73
End: 2018-07-03

## 2018-07-03 LAB
CHOLEST SERPL-MCNC: 218 MG/DL (ref 100–199)
HDLC SERPL-MCNC: 52 MG/DL
LDLC SERPL CALC-MCNC: 143 MG/DL (ref 0–99)
TRIGL SERPL-MCNC: 113 MG/DL (ref 0–149)
VLDLC SERPL CALC-MCNC: 23 MG/DL (ref 5–40)

## 2018-07-03 NOTE — TELEPHONE ENCOUNTER
----- Message from Litzy Arcos MD sent at 7/3/2018  3:07 PM EDT -----  Regarding: lipids  Slightly better lipids    We have tried every classification med available    Has not been all that effective    Continue same. .. Repatha too expensive for him    REPEAT LABS 6 MONTHS.  MAY  MG TRICOR AT THAT TIME      B  ----- Message -----     From: Mirtha Ballesteros Lab Results In     Sent: 7/3/2018   5:38 AM       To: Litzy Arcos MD

## 2018-07-03 NOTE — TELEPHONE ENCOUNTER
Spoke with patient. Verified patient with two patient identifiers. Discussed FLP, etc in depth with pt. He will watch diet, etc and repeat fasting labs in Jan 2019. Patient verbalized understanding.

## 2018-07-05 ENCOUNTER — HOSPITAL ENCOUNTER (OUTPATIENT)
Dept: PHYSICAL THERAPY | Age: 73
Discharge: HOME | End: 2018-07-05
Payer: MEDICARE

## 2018-07-05 DIAGNOSIS — E78.5 DYSLIPIDEMIA: ICD-10-CM

## 2018-07-05 DIAGNOSIS — Z98.61 S/P PTCA (PERCUTANEOUS TRANSLUMINAL CORONARY ANGIOPLASTY): ICD-10-CM

## 2018-07-05 DIAGNOSIS — I10 ESSENTIAL HYPERTENSION, BENIGN: ICD-10-CM

## 2018-07-05 DIAGNOSIS — I25.10 ATHEROSCLEROSIS OF NATIVE CORONARY ARTERY OF NATIVE HEART WITHOUT ANGINA PECTORIS: ICD-10-CM

## 2018-07-05 PROCEDURE — G8978 MOBILITY CURRENT STATUS: HCPCS

## 2018-07-05 PROCEDURE — G8979 MOBILITY GOAL STATUS: HCPCS

## 2018-07-05 PROCEDURE — 97110 THERAPEUTIC EXERCISES: CPT

## 2018-07-05 PROCEDURE — 97161 PT EVAL LOW COMPLEX 20 MIN: CPT

## 2018-07-05 NOTE — PROGRESS NOTES
PT INITIAL EVALUATION NOTE - [facility] 2-15    Patient Name: Apurva Arredondo.  Date:2018  : 1945  [x]  Patient  Verified  Payor: Rinku Helm / Plan: [address] [place] [address] HMO / Product Type: Managed Care Medicare /    In time:10:03 am  Out time:11:02 am  Total Treatment Time (min): 59 minutes  Total Timed Codes (min): 14 minutes  1:1 Treatment Time ( [address] only): 59 minutes   Visit #: 1     Treatment Area: Low back pain [M54.5]    SUBJECTIVE  Pain Level (0-10 scale): 7/10  Any medication changes, allergies to medications, adverse drug reactions, diagnosis change, or new procedure performed?: [] No    [x] Yes (see summary sheet for update)  Subjective:    Pt was referred to skilled PT for low back pain and R lumbar radiculopathy. Today, Pt reports primary complaints of constant ache pain in L low back with intermittent localized sharp pains. Pt denies any numbness/tingling or pain in legs bilaterally. 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 Pt owns a SPC, but does not use it at home. Aggravating factors include lifting heavy objects, prolonged sitting (60+ minutes), standing up, lifting up L leg/foot to put on socks, walking (5+ blocks), ascending/descending stairs. Relieving factors include ibuprofen, muscle relaxers, change positions. Pt able to sleep through the night, usually sleeps on L side. Patient reports functional limitations with: house chores, vacuuming, dusting, yard work. PLOF: Prior to 2017, Pt worked lifting cargo 6 days/week. Now, walking 1x/day, sitting/laying in bed for majority of time. Previous Treatment/Compliance: PT at [facility] S/P lumbar laminectomy ; reports full relief of symptoms. 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   Living Situation: Pt lives with son in 2-story house with 13 stairs to get to bedroom. PMHx/Surgical Hx: history of lumbar radiculopathy, coronary artery disease, primary hypertension, S/P lumbar laminectomy for lumbar spinal stenosis 08/03/13, dyslipidemia; Three back surgeries (2003, 2008, 2013)     Pt Goals: Feel better, and get back to activity with less pain. OBJECTIVE    Posture:  Slouched, forward head in sitting/standing  Other Observations:  NA  Gait and Functional Mobility:  Hunched posture  Palpation: TTP at L3 transverse process and lumbar paraspinal musculature at L3 level on L-side    R SLS: 5 seconds  L SLS: 1 second        Lumbar AROM:          R  L    Flexion    75% no change     Extension   25% significant pain in L low back      Side Bending   75% min pain  25% mod pain    Rotation   50% mod pain  50% min pain      Flexibility: Decreased hip flexor flexibility      MMT:               HIP Ext (knee bent): R: 4/5 (muscle spasm in R HS); L: 4-/5              HIP Abd: R: 4/5; L: 4/5 (pain in L low back)  Neurological: Reflexes / Sensations: NT  Special Tests:    Trendelenberg: (-)    FABERS: (+) R>L   Forward Bend: (-)    Slump: NT   H.S. SLR: (-)     Piriformis Ext: NT   Long Sit: NT     Lumbar Distraction: NT   SI Compression/Distraction: NT      14 min Therapeutic Exercise:  [x] See flow sheet :   Rationale: increase ROM and increase strength to improve the patients ability to perform ADLs with decreased pain or discomfort. With   [x] TE   [] TA   [] neuro   [x] other: Patient Education: [x] Review HEP    [] Progressed/Changed HEP based on:   [] positioning   [] body mechanics   [] transfers   [] heat/ice application    [x] other: Educated Pt regarding importance of increased activity and correlation with improved condition. Educated Pt regarding diagnosis, role of PT and POC.        Other Objective/Functional Measures:  FOTO Score: 60/100  Timed Stair Test: 2.5 times (15 steps) in 30 seconds  30 second sit to stand: 6: min pain with standing up in L low back    Pain Level (0-10 scale) post treatment: 5/10    ASSESSMENT/Changes in Function:     [x]  See Plan of [address] 7/5/2018  9:52 AM

## 2018-07-05 NOTE — PROGRESS NOTES
[address] [address] ([facility] Jeremie Lyle  Phone: [phone] Fax: [phone]     Plan of Care/Statement of Necessity for Physical Therapy Services  2-15    Patient name: Harry Sanchez.  : 1945  Provider#: [id number]  Referral source: Salomón Vallejo, *      Medical/Treatment Diagnosis: Low back pain [M54.5]     Prior Hospitalization: see medical history     Comorbidities: Back pain, Prior Surgery  Prior Level of Function: Patient completed 20 minutes of exercise once or twice a week. Medications: Verified on Patient Summary List  Start of Care: 18      Onset Date: 10/2018   The Plan of Care and following information is based on the information from the initial evaluation. Assessment/ key information: Pt is a very pleasant 74year old retired male who was referred to skilled PT for Low back pain. Based on examination, Pt presents with localized L-sided low back pain without radiculopathy, with a flexion directional preference. Pt presents with various impairments including decreased lumbar ROM (Extension/L SB>>flexion/rotation), impaired static/dynamic standing balance, decreased hip and core strength/stability, poor sitting and standing posture, and decreased functional activity tolerance. Pt would benefit from skilled PT to address muscle weakness, decrease pain, range of motion limitations, increase activity tolerance, and balance training.       Evaluation Complexity History MEDIUM  Complexity : 1-2 comorbidities / personal factors will impact the outcome/ POC ; Examination MEDIUM Complexity : 3 Standardized tests and measures addressing body structure, function, activity limitation and / or participation in recreation  ;Presentation LOW Complexity : Stable, uncomplicated  ;Clinical Decision Making MEDIUM Complexity : FOTO score of 26-74  Overall Complexity Rating: LOW     Problem List: pain affecting function, decrease ROM, decrease strength, impaired gait/ balance, decrease ADL/ functional abilitiies, decrease activity tolerance, decrease flexibility/ joint mobility and decrease transfer abilities   Treatment Plan may include any combination of the following: Therapeutic exercise, Therapeutic activities, Neuromuscular re-education, Physical agent/modality, Gait/balance training, Manual therapy, Patient education, Self Care training, Functional mobility training, Home safety training and Stair training  Patient / Family readiness to learn indicated by: asking questions, trying to perform skills and interest  Persons(s) to be included in education: patient (P)  Barriers to Learning/Limitations: yes;  sensory deficits-vision/hearing/speech  Patient Goal (s):  Feel better, and get back to activity with less pain.   Patient Self Reported Health Status: fair  Rehabilitation Potential: good    Short Term Goals: To be accomplished in 6 treatments:   1. Pt will be independent and compliant with HEP. 2. Pt will report walking >/= 3 times for at least 5 consecutive minutes in order to increase functional activity tolerance and decrease back pain. 3. Pt will improve SLS to >/= 10 seconds bilaterally demonstrating improved proprioceptive input in order to decrease fall risk. Long Term Goals: To be accomplished in 12 treatments:   1. Pt will be independent and compliant with HEP. 2. Pt will improve FOTO score by the MCID from 60/100 to 65.100 demonstrating improved overall function with decreased pain and discomfort. 3. Pt will report walking >/= 3 times for at least 15 consecutive minutes in order to increase functional activity tolerance and decrease back pain. 4.  Pt will improve SLS to >/= 20 seconds bilaterally demonstrating improved proprioceptive input in order to decrease fall risk.     5. Pt will improved timed stair test to 20 steps without use of handrail and without complaints of increased back pain demonstrating improved hip strength and efficiency with functional activity. 6. Pt will improve 30 second sit-to-stand test to 8 without complaints of back pain demonstrating improved hip strength and efficiency with functional activity. Frequency / Duration: Patient to be seen 2 times per week for 12 treatments. Patient/ Caregiver education and instruction: self care, activity modification and exercises    [x]  Plan of care has been reviewed with PTA    G-Codes (GP)  Mobility   Current  CK= 40-59%   Goal  CJ= 20-39%      The severity rating is based on clinical judgment and the FOTO Score. Certification Period: 7/5/18 - 10/5/18  Jr Taylor 7/5/2018 12:16 PM    ________________________________________________________________________    I certify that the above Therapy Services are being furnished while the patient is under my care. I agree with the treatment plan and certify that this therapy is necessary.     [de-identified] Signature:____________________  Date:____________Time: _________

## 2018-07-09 ENCOUNTER — HOSPITAL ENCOUNTER (OUTPATIENT)
Dept: PHYSICAL THERAPY | Age: 73
Discharge: HOME | End: 2018-07-09
Payer: MEDICARE

## 2018-07-09 PROCEDURE — 97110 THERAPEUTIC EXERCISES: CPT

## 2018-07-09 PROCEDURE — 97140 MANUAL THERAPY 1/> REGIONS: CPT

## 2018-07-09 NOTE — PROGRESS NOTES
PT DAILY TREATMENT NOTE - [facility] 2-15    Patient Name: Shantell Porter.  Date:2018  : 1945  [x]  Patient  Verified  Payor: Rachel Julian / Plan: [address] [place] [address] HMO / Product Type: Managed Care Medicare /    In time:9:29 am Out time:10:25 am  Total Treatment Time (min): 56 minutes  Total Timed Codes (min): 56 minutes  1:1 Treatment Time ( only): 45 minutes   Visit #: 2     Treatment Area: Low back pain [M54.5]    SUBJECTIVE  Pain Level (0-10 scale): 610  Any medication changes, allergies to medications, adverse drug reactions, diagnosis change, or new procedure performed?: [x] No    [] Yes (see summary sheet for update)  Subjective functional status/changes:   [] No changes reported  Pt reports he has been feeling much better. The severity of his low back pain has significantly improved. The exercises/stretches seem to be much helpful. The discomfort in L lower back seems to occur most often in the morning and with standing up. OBJECTIVE     38 min Therapeutic Exercise:  [x] See flow sheet :   Rationale: increase ROM and increase strength to improve the patients ability to perform ADLs with decreased pain or discomfort. 18 min Manual Therapy: STM of L lumbar paraspinal and QL musculature in R S/L; Contract-relax PNF of L QL    Rationale: decrease pain, increase ROM and increase tissue extensibility to improve the patients ability to perform ADLs with decreased pain or discomfort. With   [x] TE   [] TA   [] neuro   [] other: Patient Education: [x] Review HEP    [] Progressed/Changed HEP based on:   [] positioning   [] body mechanics   [] transfers   [] heat/ice application    [] other:      Other Objective/Functional Measures: NA     Pain Level (0-10 scale) post treatment: 4.5/10    ASSESSMENT/Changes in Function:   Pt required significant and extensive verbal and tactile cuing for posterior pelvic tilts to facilitate proper form and decrease stress on low back;  Pt had tendency to arch low back which increased low back pain or hold his breath; he eventually performed exercise correctly with some consistency, but will need to review next session (instructed to hold off at home if it causes any discomfort.)   Pt noted feeling discomfort in sitting up portion of repeated seated flexion; modified to incorporate swiss ball to minimize lumbar activation/stress on low back and Pt denied any further pain with activity. Patient will continue to benefit from skilled PT services to modify and progress therapeutic interventions, address functional mobility deficits, address ROM deficits, address strength deficits, analyze and address soft tissue restrictions, analyze and cue movement patterns, analyze and modify body mechanics/ergonomics, assess and modify postural abnormalities and instruct in home and community integration to attain remaining goals. []  See Plan of Care  []  See progress note/recertification  []  See Discharge Summary         Progress towards goals / Updated goals:  Short Term Goals: To be accomplished in 6 treatments:                        1. Pt will be independent and compliant with HEP. 2. Pt will report walking >/= 3 times for at least 5 consecutive minutes in order to increase functional activity tolerance and decrease back pain. 3. Pt will improve SLS to >/= 10 seconds bilaterally demonstrating improved proprioceptive input in order to decrease fall risk. Long Term Goals: To be accomplished in 12 treatments:                        1. Pt will be independent and compliant with HEP. 2. Pt will improve FOTO score by the MCID from 60/100 to 65.100 demonstrating improved overall function with decreased pain and discomfort. 3. Pt will report walking >/= 3 times for at least 15 consecutive minutes in order to increase functional activity tolerance and decrease back pain. 4.  Pt will improve SLS to >/= 20 seconds bilaterally demonstrating improved proprioceptive input in order to decrease fall risk. 5. Pt will improved timed stair test to 20 steps without use of handrail and without complaints of increased back pain demonstrating improved hip strength and efficiency with functional activity.                         6. Pt will improve 30 second sit-to-stand test to 8 without complaints of back pain demonstrating improved hip strength and efficiency with functional activity.        PLAN  [x]  Upgrade activities as tolerated     [x]  Continue plan of care  [x]  Update interventions per flow sheet       []  Discharge due to:_  []  Other:_      Traci Plants 7/9/2018  7:23 AM

## 2018-07-16 ENCOUNTER — APPOINTMENT (OUTPATIENT)
Dept: PHYSICAL THERAPY | Age: 73
End: 2018-07-16
Payer: MEDICARE

## 2018-07-23 ENCOUNTER — APPOINTMENT (OUTPATIENT)
Dept: PHYSICAL THERAPY | Age: 73
End: 2018-07-23
Payer: MEDICARE

## 2018-07-25 ENCOUNTER — OFFICE VISIT (OUTPATIENT)
Dept: INTERNAL MEDICINE CLINIC | Age: 73
End: 2018-07-25

## 2018-07-25 VITALS
SYSTOLIC BLOOD PRESSURE: 131 MMHG | RESPIRATION RATE: 18 BRPM | OXYGEN SATURATION: 95 % | HEIGHT: 66 IN | TEMPERATURE: 97.8 F | HEART RATE: 69 BPM | DIASTOLIC BLOOD PRESSURE: 77 MMHG | BODY MASS INDEX: 26.48 KG/M2 | WEIGHT: 164.8 LBS

## 2018-07-25 DIAGNOSIS — M54.42 CHRONIC LEFT-SIDED LOW BACK PAIN WITH LEFT-SIDED SCIATICA: ICD-10-CM

## 2018-07-25 DIAGNOSIS — I10 ESSENTIAL HYPERTENSION, BENIGN: ICD-10-CM

## 2018-07-25 DIAGNOSIS — R52 INTRACTABLE PAIN: ICD-10-CM

## 2018-07-25 DIAGNOSIS — M54.16 RIGHT LUMBAR RADICULOPATHY: Primary | ICD-10-CM

## 2018-07-25 DIAGNOSIS — G89.29 CHRONIC LEFT-SIDED LOW BACK PAIN WITH LEFT-SIDED SCIATICA: ICD-10-CM

## 2018-07-25 NOTE — PROGRESS NOTES
1. Have you been to the ER, urgent care clinic since your last visit? Hospitalized since your last visit? No    2. Have you seen or consulted any other health care providers outside of the [address] [place] [address] since your last visit? Include any pap smears or colon screening.  No

## 2018-07-25 NOTE — MR AVS SNAPSHOT
[facility] 
 
 
 [address] [address] 
[phone] Patient: Salazar Rosas Sr. MRN: [id number] TDA:[id number]/7/[id number] Visit Information Date & Time Provider Department Dept. Phone Encounter #  
 7/25/2018  9:30 AM Amaya Carcamo [facility] [phone] [id number] Follow-up Instructions Return in about 3 months (around 10/25/2018). Follow-up and Disposition History Your Appointments 9/25/2018 10:15 AM  
ESTABLISHED PATIENT with Surjit Hernandez MD  
[facility]) Appt Note: 6 MO. F/U FOLLOWING LABS  
 [address] [place] [address]  
[phone] [address]  
  
    
 9/28/2018  9:00 AM  
Any with Daniel Goins MD  
[address] [place] [address] and Primary Care [facility]) Appt Note: 6 month f/u  
 [address] [facility]  
  
   
 [address] [id number] Upcoming Health Maintenance Date Due  
 GLAUCOMA SCREENING Q2Y 10/25/2018* Influenza Age 5 to Adult 8/1/2018 MEDICARE YEARLY EXAM 3/24/2019 COLONOSCOPY 1/17/2028 DTaP/Tdap/Td series (2 - Td) 3/23/2028 *Topic was postponed. The date shown is not the original due date. Allergies as of 7/25/2018  Review Complete On: 7/25/2018 By: Daniel Goins MD  
  
 Severity Noted Reaction Type Reactions Statins-hmg-coa Reductase Inhibitors  07/17/2012    Other (comments) Body aches Current Immunizations  Reviewed on 8/12/2013 Name Date Pneumococcal Polysaccharide (PPSV-23) 8/13/2013  1:04 PM  
  
 Not reviewed this visit You Were Diagnosed With   
  
 Codes Comments Right lumbar radiculopathy    -  Primary ICD-10-CM: M54.16 
ICD-9-CM: 724.4 Intractable pain     ICD-10-CM: Q65 ICD-9-CM: 780.96   
 Chronic left-sided low back pain with left-sided sciatica     ICD-10-CM: M54.42, G89.29 ICD-9-CM: 724.2, 724.3, 338.29 Essential hypertension, benign     ICD-10-CM: I10 
ICD-9-CM: 401.1 Vitals BP Pulse Temp Resp Height(growth percentile) Weight(growth percentile) 131/77 69 97.8 °F (36.6 °C) (Oral) 18 5' 6\" (1.676 m) 164 lb 12.8 oz (74.8 kg) SpO2 BMI Smoking Status 95% 26.6 kg/m2 Never Smoker Vitals History BMI and BSA Data Body Mass Index Body Surface Area  
 26.6 kg/m 2 1.87 m 2 Preferred Pharmacy Pharmacy Name Phone [facility] [address] [place] [address] [phone] Your Updated Medication List  
  
   
This list is accurate as of 7/25/18 11:06 AM.  Always use your most recent med list.  
  
  
  
  
 aspirin delayed-release 81 mg tablet Take 81 mg by mouth daily. celecoxib 200 mg capsule Commonly known as:  CELEBREX Take 1 Cap by mouth two (2) times a day for 90 days. colesevelam 625 mg tablet Commonly known as:  [facility] Take 3 Tabs by mouth two (2) times daily (with meals). fenofibrate nanocrystallized 48 mg tablet Commonly known as:  TRICOR  
TAKE ONE TABLET BY MOUTH ONCE DAILY  
  
 flaxseed 1,000 mg Cap Take 1 Cap by mouth daily. lisinopril-hydroCHLOROthiazide 20-12.5 mg per tablet Commonly known as:  PRINZIDE, ZESTORETIC  
TAKE ONE TABLET BY MOUTH ONCE DAILY MULTIPLE VITAMIN PO Take  by mouth daily. omeprazole 20 mg capsule Commonly known as:  PRILOSEC Take 20 mg by mouth daily. red yeast rice extract 600 mg Cap Take 600 mg by mouth daily. STOOL SOFTENER PO Take 100 mg by mouth as needed. tiZANidine 4 mg tablet Commonly known as:  Debbie Flattery TAKE 1 TABLET THREE TIMES A DAY. Follow-up Instructions Return in about 3 months (around 10/25/2018). To-Do List   
 07/30/2018 10:00 AM  
 Appointment with Moon Breaux at [facility] ([phone]) Please remember to arrive at the hospital at least 30 minutes prior to your scheduled appointment time. When you come in for your appointment, please be sure to bring the therapy prescription the doctor gave you, your insurance card, and a list of the medicines you are taking. Also, please remember to wear comfortable, loose- fitting clothes. We look forward to seeing you. Introducing [facility]! [employer] introduces Hoot.Me patient portal. Now you can access parts of your medical record, email your doctor's office, and request medication refills online. 1. In your internet browser, go to [email or web address] 2. Click on the First Time User? Click Here link in the Sign In box. You will see the New Member Sign Up page. 3. Enter your Hoot.Me Access Code exactly as it appears below. You will not need to use this code after youve completed the sign-up process. If you do not sign up before the expiration date, you must request a new code. · Hoot.Me Access Code: [email or web address] Expires: 9/23/2018 11:42 AM 
 
4. Enter the last four digits of your Social Security Number (xxxx) and Date of Birth (mm/dd/yyyy) as indicated and click Submit. You will be taken to the next sign-up page. 5. Create a Hoot.Me ID. This will be your Hoot.Me login ID and cannot be changed, so think of one that is secure and easy to remember. 6. Create a Hoot.Me password. You can change your password at any time. 7. Enter your Password Reset Question and Answer. This can be used at a later time if you forget your password. 8. Enter your e-mail address. You will receive e-mail notification when new information is available in [address] 9. Click Sign Up. You can now view and download portions of your medical record. 10. Click the Download Summary menu link to download a portable copy of your medical information. If you have questions, please visit the Frequently Asked Questions section of the eTobbt website. Remember, [employer] is NOT to be used for urgent needs. For medical emergencies, dial 911. Now available from your iPhone and Android! Please provide this summary of care documentation to your next provider. Your primary care clinician is listed as Spring Ryan. If you have any questions after today's visit, please call [phone].

## 2018-07-25 NOTE — PROGRESS NOTES
SPORTS MEDICINE AND PRIMARY CARE  Elise Guzmán MD, [address] [address]  Phone:  [phone]  Fax: [phone]      Chief Complaint   Patient presents with    Back Pain     f/u   +++      SUBECTIVE:    Manuel Guardado. is a 74 y.o. male Patient returns today with known history of asthma, coronary artery disease, followed by Dr. Lake Ricardo, primary hypertension, lumbar spinal stenosis, S/P lumbar laminectomy with instrumentation 2013, dyslipidemia, and is seen for evaluation. He's been going to PT on a regular basis. States he's feeling better since he started the physical activity program with PT. He's doing the exercises at home likewise. He still has some sessions to go. Patient is seen for evaluation. Current Outpatient Prescriptions   Medication Sig Dispense Refill    tiZANidine (ZANAFLEX) 4 mg tablet TAKE 1 TABLET THREE TIMES A DAY. 60 Tab 1    celecoxib (CELEBREX) 200 mg capsule Take 1 Cap by mouth two (2) times a day for 90 days. 60 Cap 2    colesevelam (WELCHOL) 625 mg tablet Take 3 Tabs by mouth two (2) times daily (with meals). 180 Tab 6    omeprazole (PRILOSEC) 20 mg capsule Take 20 mg by mouth daily.  fenofibrate nanocrystallized (TRICOR) 48 mg tablet TAKE ONE TABLET BY MOUTH ONCE DAILY 90 Tab 3    lisinopril-hydroCHLOROthiazide (PRINZIDE, ZESTORETIC) 20-12.5 mg per tablet TAKE ONE TABLET BY MOUTH ONCE DAILY 90 Tab 1    DOCUSATE CALCIUM (STOOL SOFTENER PO) Take 100 mg by mouth as needed.  MULTIVITAMIN (MULTIPLE VITAMIN PO) Take  by mouth daily.  red yeast rice extract 600 mg cap Take 600 mg by mouth daily.  aspirin delayed-release 81 mg tablet Take 81 mg by mouth daily.  flaxseed 1,000 mg cap Take 1 Cap by mouth daily.        Past Medical History:   Diagnosis Date    Asthma     hx asthma - no attack in 40+ yrs    Back pain     CAD (coronary artery disease)     Chest pain, unspecified     Chest pain, unspecified  Chest pain, unspecified     Coronary atherosclerosis of native coronary artery 07/17/2012    stent    Coronary atherosclerosis of native coronary artery     Essential hypertension     Essential hypertension, benign     Lumbar spinal stenosis 08/03/2013    SPINE LUMBAR LAMINECTOMY WITH INSTRUMENTATION - p tamiko    Mixed hyperlipidemia     Other ill-defined conditions(799.89)     HIGH CHOLESTEROL    Other malaise and fatigue     Pure hypercholesterolemia     S/P colonoscopy 01/17/2018    md hanh tics     Past Surgical History:   Procedure Laterality Date    COLONOSCOPY N/A 1/17/2018    COLONOSCOPY performed by Denise Westbrook MD at [address]  X2    HX GI      COLONOSCOPY    HX HEART CATHETERIZATION      STENT (DR Carol Gee)    HX LUMBAR LAMINECTOMY  8/2013    HX PTCA       Allergies   Allergen Reactions    Statins-Hmg-Coa Reductase Inhibitors Other (comments)     Body aches       REVIEW OF SYSTEMS:   There is no radiation of discomfort in the legs. No falls. Social History     Social History    Marital status:      Spouse name: N/A    Number of children: N/A    Years of education: N/A     Social History Main Topics    Smoking status: Never Smoker    Smokeless tobacco: Never Used    Alcohol use No    Drug use: No    Sexual activity: Not Currently     Partners: Female     Other Topics Concern    None     Social History Narrative    Medical History: BPHEDelevated PSACholesterolHypertensionMRI September 2, 2011 leftw bonnie convexity along the mid to low er lumbar spine    and a rightw bonnie convexity along the thoracolumbar junction. 2 the patient has multilevel degenerative disc disease. 3 there is moderate spinal    stenosis at L3-4 and L4-5 levels. In addition there is facet arthropathy and asymmetrical disc protrusions at L3-4 and L4-5 levels.  There is a    narrow ing of the lateral recesses w ith the potential for extrinsic compression upon the origins of the L4 and the L5 nerve roots respectivelyCT    abdomen 2003 malrotation left kidney otherw ise normalCarotid sinus syndrome    Surgical History: colonoscopy by Alexander Cotton M.D. [id number] quiles md [id number] S1 laminectomy 2013    Hospitalization/Major Diagnostic Procedure: prolonged syncop'e 06    Family History: Mother: , cancerFather: , unknow nSister(s): aliveBrother(s): aliveDaughter(s): aliveGrandmother:    , natural causesGrandfather: , natural causes1 sister(s) - healthy. 3 son(s) , 3 daughter(s) . Social History: Alcohol Use Patient does not use alcohol. Smoking Status Patient is a never smoker. Caffeine: per day, coffee, soda, tea. Marital Status: . Lives w ith: alone. Children: yes, children. 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   r  Family History   Problem Relation Age of Onset    Cancer Mother      female    Anesth Problems Neg Hx        OBJECTIVE:  Visit Vitals    /77    Pulse 69    Temp 97.8 °F (36.6 °C) (Oral)    Resp 18    Ht 5' 6\" (1.676 m)    Wt 164 lb 12.8 oz (74.8 kg)    SpO2 95%    BMI 26.6 kg/m2   . ENT: perrla,  eom intact  NECK: supple. Thyroid normal  CHEST: clear to ascultation and percussion   HEART: regular rate and rhythm  ABD: soft, bowel sounds active  EXTREMITIES: no edema, pulse 1+     Orders Only on 2018   Component Date Value Ref Range Status    Cholesterol, total 2018 218* 100 - 199 mg/dL Final    Triglyceride 2018 113  0 - 149 mg/dL Final    HDL Cholesterol 2018 52  >39 mg/dL Final    VLDL, calculated 2018 23  5 - 40 mg/dL Final    LDL, calculated 2018 143* 0 - 99 mg/dL Final          ASSESSMENT:  1. Right lumbar radiculopathy    2. Intractable pain    3. Chronic left-sided low back pain with left-sided sciatica    4.  Essential hypertension, benign      Patient's responded to conservative therapy with analgesics, muscle relaxant and PT. He has responded very nicely. We discussed with him dos and donts regarding his back and suggested he have a vacuum  on each floor that he wants to vacuum and suggested he have his son take his vacuum  up to the second floor. He is in agreement with the plan. BP control is at goal.    BMI approaches ideal body weight. He's doing better. He'll return to see us in about three months, sooner if he has any problems. He's reminded he can walk in to see us at any point if he has an urgency and unable to get an appointment  I have discussed the diagnosis with the patient and the intended plan as seen in the  orders above. The patient understands and agees with the plan. The patient has   received an after visit summary and questions were answered concerning  future plans  Patient labs and/or xrays were reviewed  Past records were reviewed. PLAN:    Follow-up Disposition:  Return in about 3 months (around 10/25/2018). ATTENTION:   This medical record was transcribed using an electronic medical records system. Although proofread, it may and can contain electronic and spelling errors. Other human spelling and other errors may be present. Corrections may be executed at a later time. Please feel free to contact us for any clarifications as needed.

## 2018-07-30 ENCOUNTER — APPOINTMENT (OUTPATIENT)
Dept: PHYSICAL THERAPY | Age: 73
End: 2018-07-30
Payer: MEDICARE

## 2018-09-12 NOTE — ANCILLARY DISCHARGE INSTRUCTIONS
[facility] [address] [place] [address] [address] Phone: [phone] Fax: [phone] Discharge Summary 2-15 Patient name: Frank Trevino SrJovanny  : 1945  Provider#: [id number] Referral source: Solange Jon, * Medical/Treatment Diagnosis: Low back pain [M54.5] Prior Hospitalization: see medical history Comorbidities: See Plan of Care Prior Level of Function: See Plan of Care Medications: Verified on Patient Summary List 
 
Start of Care: 18      Onset Date:10/2017 Visits from Start of Care: 2     Missed Visits: 3 Reporting Period : 18 to 18 Assessment/Summary of care: Patient was seen for 2 skilled physical therapy visits for low back pain and right lumbar radiculopathy. Pt is discharged today, 2018, as they have stopped attending therapy. Final objective data and outcomes were unable to be obtained. G-Codes (GP) Mobility  Goal  CJ= 20-39%  D/C  CK= 40-59% The severity rating is based on clinical judgment. RECOMMENDATIONS: 
[x]Discontinue therapy: []Patient has reached or is progressing toward set goals [x]Patient is non-compliant or has abdicated 
   []Due to lack of appreciable progress towards set goals []Other Kayla Hudson 2018 12:16 PM

## 2018-09-25 ENCOUNTER — OFFICE VISIT (OUTPATIENT)
Dept: CARDIOLOGY CLINIC | Age: 73
End: 2018-09-25

## 2018-09-25 VITALS
WEIGHT: 166.8 LBS | OXYGEN SATURATION: 99 % | BODY MASS INDEX: 26.81 KG/M2 | HEART RATE: 68 BPM | RESPIRATION RATE: 18 BRPM | DIASTOLIC BLOOD PRESSURE: 80 MMHG | HEIGHT: 66 IN | SYSTOLIC BLOOD PRESSURE: 120 MMHG

## 2018-09-25 DIAGNOSIS — Z98.61 S/P PTCA (PERCUTANEOUS TRANSLUMINAL CORONARY ANGIOPLASTY): ICD-10-CM

## 2018-09-25 DIAGNOSIS — I25.10 ATHEROSCLEROSIS OF NATIVE CORONARY ARTERY OF NATIVE HEART WITHOUT ANGINA PECTORIS: ICD-10-CM

## 2018-09-25 DIAGNOSIS — M54.42 CHRONIC LEFT-SIDED LOW BACK PAIN WITH LEFT-SIDED SCIATICA: ICD-10-CM

## 2018-09-25 DIAGNOSIS — E78.5 DYSLIPIDEMIA: ICD-10-CM

## 2018-09-25 DIAGNOSIS — G89.29 CHRONIC LEFT-SIDED LOW BACK PAIN WITH LEFT-SIDED SCIATICA: ICD-10-CM

## 2018-09-25 DIAGNOSIS — I10 ESSENTIAL HYPERTENSION, BENIGN: Primary | ICD-10-CM

## 2018-09-25 RX ORDER — FENOFIBRATE 48 MG/1
145 TABLET, COATED ORAL DAILY
Qty: 90 TAB | Refills: 3 | Status: SHIPPED | OUTPATIENT
Start: 2018-09-25 | End: 2019-01-14 | Stop reason: SDUPTHER

## 2018-09-25 NOTE — PATIENT INSTRUCTIONS
-- INCREASE Tricor to 145 mg daily  -- Watch the amount of refined carbohydrates you eat: bread, pasta, cookies, etc.  -- Eat a heart healthy diet like the Mediterranean diet printed below; this will help with your cholesterol levels    -- We will repeat cholesterol labs in January 2019,   -- 6 month follow up    Learning About the [address] Diet  What is the Mediterranean diet? The Mediterranean diet is a style of eating rather than a diet plan. It features foods eaten in [place], [place], [place] and [place], and other countries along the [facility]. It emphasizes eating foods like fish, fruits, vegetables, beans, high-fiber breads and whole grains, nuts, and olive oil. This style of eating includes limited red meat, cheese, and sweets. Why choose the Mediterranean diet? A Mediterranean-style diet may improve heart health. It contains more fat than other heart-healthy diets. But the fats are mainly from nuts, unsaturated oils (such as fish oils and olive oil), and certain nut or seed oils (such as canola, soybean, or flaxseed oil). These fats may help protect the heart and blood vessels. How can you get started on the Mediterranean diet? Here are some things you can do to switch to a more Mediterranean way of eating. What to eat  · Eat a variety of fruits and vegetables each day, such as grapes, blueberries, tomatoes, broccoli, peppers, figs, olives, spinach, eggplant, beans, lentils, and chickpeas. · Eat a variety of whole-grain foods each day, such as oats, brown rice, and whole wheat bread, pasta, and couscous. · Eat fish at least 2 times a week. Try tuna, salmon, mackerel, lake trout, herring, or sardines. · Eat moderate amounts of low-fat dairy products, such as milk, cheese, or yogurt. · Eat moderate amounts of poultry and eggs. · Choose healthy (unsaturated) fats, such as nuts, olive oil, and certain nut or seed oils like canola, soybean, and flaxseed.   · Limit unhealthy (saturated) fats, such as butter, palm oil, and coconut oil. And limit fats found in animal products, such as meat and dairy products made with whole milk. Try to eat red meat only a few times a month in very small amounts. · Limit sweets and desserts to only a few times a week. This includes sugar-sweetened drinks like soda. The Mediterranean diet may also include red wine with your meal-1 glass each day for women and up to 2 glasses a day for men. Tips for eating at home  · Use herbs, spices, garlic, lemon zest, and citrus juice instead of salt to add flavor to foods. · Add avocado slices to your sandwich instead of silva. · Have fish for lunch or dinner instead of red meat. Brush the fish with olive oil, and broil or grill it. · Sprinkle your salad with seeds or nuts instead of cheese. · Cook with olive or canola oil instead of butter or oils that are high in saturated fat. · Switch from 2% milk or whole milk to 1% or fat-free milk. · Dip raw vegetables in a vinaigrette dressing or hummus instead of dips made from mayonnaise or sour cream.  · Have a piece of fruit for dessert instead of a piece of cake. Try baked apples, or have some dried fruit. Tips for eating out  · Try broiled, grilled, baked, or poached fish instead of having it fried or breaded. · Ask your  to have your meals prepared with olive oil instead of butter. · Order dishes made with marinara sauce or sauces made from olive oil. Avoid sauces made from cream or mayonnaise. · Choose whole-grain breads, whole wheat pasta and pizza crust, brown rice, beans, and lentils. · Cut back on butter or margarine on bread. Instead, you can dip your bread in a small amount of olive oil. · Ask for a side salad or grilled vegetables instead of french fries or chips. Where can you learn more? Go to http://valerie-vivian.info/. Enter 318-942-3433 in the search box to learn more about \"Learning About the Mediterranean Diet. \"  Current as of: May 12, 2017  Content Version: 11.7  © 7312-2758 [employer]. Care instructions adapted under license by [employer] (which disclaims liability or warranty for this information). If you have questions about a medical condition or this instruction, always ask your healthcare professional. [address] any warranty or liability for your use of this information.

## 2018-09-25 NOTE — MR AVS SNAPSHOT
[address] [place] [address] 
 
 
 [address] 
[phone] Patient: Brad Howell Sr. MRN: [id number] YD Visit Information Date & Time Provider Department Dept. Phone Encounter #  
 2018 10:15 AM Breezy Zamarripa MD [facility] [phone] [id number] Your Appointments 10/3/2018 12:30 PM  
Any with Дмитрий Belle MD  
[address] [place] [address] and Primary Care Deanne Gaspar) Appt Note: 3 MONTH FOLLOW UP; R/S TR 18  
 [id number]  
[phone]  
  
   
 [id number] Upcoming Health Maintenance Date Due Shingrix Vaccine Age 50> (1 of 2) 10/4/1993 Influenza Age 5 to Adult 2018 GLAUCOMA SCREENING Q2Y 10/25/2018* MEDICARE YEARLY EXAM 3/24/2019 COLONOSCOPY 2028 DTaP/Tdap/Td series (2 - Td) 3/23/2028 *Topic was postponed. The date shown is not the original due date. Allergies as of 2018  Review Complete On: 2018 By: Tarik Kemp LPN Severity Noted Reaction Type Reactions Statins-hmg-coa Reductase Inhibitors  2012    Other (comments) Body aches Current Immunizations  Reviewed on 2013 Name Date Pneumococcal Polysaccharide (PPSV-23) 2013  1:04 PM  
  
 Not reviewed this visit You Were Diagnosed With   
  
 Codes Comments Essential hypertension, benign    -  Primary ICD-10-CM: I10 
ICD-9-CM: 401.1 Dyslipidemia     ICD-10-CM: E78.5 ICD-9-CM: 272.4 Atherosclerosis of native coronary artery of native heart without angina pectoris     ICD-10-CM: I25.10 ICD-9-CM: 414.01 Chronic left-sided low back pain with left-sided sciatica     ICD-10-CM: M54.42, G89.29 ICD-9-CM: 724.2, 724.3, 338.29 S/P PTCA (percutaneous transluminal coronary angioplasty)     ICD-10-CM: Z98.61 ICD-9-CM: V45.82 Vitals BP Pulse Resp Height(growth percentile) Weight(growth percentile) SpO2  
 120/80 (BP 1 Location: Left arm, BP Patient Position: Sitting) 68 18 5' 6\" (1.676 m) 166 lb 12.8 oz (75.7 kg) 99% BMI Smoking Status 26.92 kg/m2 Never Smoker Vitals History BMI and BSA Data Body Mass Index Body Surface Area  
 26.92 kg/m 2 1.88 m 2 Preferred Pharmacy Pharmacy Name Phone [facility] [address] [phone] Your Updated Medication List  
  
   
This list is accurate as of 9/25/18 10:47 AM.  Always use your most recent med list.  
  
  
  
  
 aspirin delayed-release 81 mg tablet Take 81 mg by mouth daily. colesevelam 625 mg tablet Commonly known as:  [facility] Take 3 Tabs by mouth two (2) times daily (with meals). fenofibrate nanocrystallized 48 mg tablet Commonly known as:  [employer] Take 3 Tabs by mouth daily. Indications: hyperlipidemia  
  
 flaxseed 1,000 mg Cap Take 1 Cap by mouth daily. lisinopril-hydroCHLOROthiazide 20-12.5 mg per tablet Commonly known as:  PRINZIDE, ZESTORETIC  
TAKE ONE TABLET BY MOUTH ONCE DAILY MULTIPLE VITAMIN PO Take  by mouth daily. omeprazole 20 mg capsule Commonly known as:  PRILOSEC Take 20 mg by mouth daily. red yeast rice extract 600 mg Cap Take 600 mg by mouth daily. STOOL SOFTENER PO Take 100 mg by mouth as needed. tiZANidine 4 mg tablet Commonly known as:  Joselyn Reagin TAKE 1 TABLET THREE TIMES A DAY. Prescriptions Sent to Pharmacy Refills  
 fenofibrate nanocrystallized (TRICOR) 48 mg tablet 3 Sig: Take 3 Tabs by mouth daily. Indications: hyperlipidemia Class: Normal  
 Pharmacy: [facility] [address], [address] [place] [address] Ph #: [phone] Route: Oral  
  
We Performed the Following AMB POC EKG ROUTINE W/ 12 LEADS, INTER & REP [60628 CPT(R)] CK [id number] CPT(R)] LIPID PANEL [13656 CPT(R)] METABOLIC PANEL, COMPREHENSIVE [68955 CPT(R)] Patient Instructions -- INCREASE Tricor to 145 mg daily 
-- Watch the amount of refined carbohydrates you eat: bread, pasta, cookies, etc. 
-- Eat a heart healthy diet like the Mediterranean diet printed below; this will help with your cholesterol levels 
 
-- We will repeat cholesterol labs in January 2019,  
-- 6 month follow up Learning About the [address] [facility] Diet What is the Mediterranean diet? The Mediterranean diet is a style of eating rather than a diet plan. It features foods eaten in [place], [place], [place] and [place], and other countries along the [facility]. It emphasizes eating foods like fish, fruits, vegetables, beans, high-fiber breads and whole grains, nuts, and olive oil. This style of eating includes limited red meat, cheese, and sweets. Why choose the Mediterranean diet? A Mediterranean-style diet may improve heart health. It contains more fat than other heart-healthy diets. But the fats are mainly from nuts, unsaturated oils (such as fish oils and olive oil), and certain nut or seed oils (such as canola, soybean, or flaxseed oil). These fats may help protect the heart and blood vessels. How can you get started on the Mediterranean diet? Here are some things you can do to switch to a more Mediterranean way of eating. What to eat · Eat a variety of fruits and vegetables each day, such as grapes, blueberries, tomatoes, broccoli, peppers, figs, olives, spinach, eggplant, beans, lentils, and chickpeas. · Eat a variety of whole-grain foods each day, such as oats, brown rice, and whole wheat bread, pasta, and couscous. · Eat fish at least 2 times a week. Try tuna, salmon, mackerel, lake trout, herring, or sardines. · Eat moderate amounts of low-fat dairy products, such as milk, cheese, or yogurt. · Eat moderate amounts of poultry and eggs. · Choose healthy (unsaturated) fats, such as nuts, olive oil, and certain nut or seed oils like canola, soybean, and flaxseed. · Limit unhealthy (saturated) fats, such as butter, palm oil, and coconut oil. And limit fats found in animal products, such as meat and dairy products made with whole milk. Try to eat red meat only a few times a month in very small amounts. · Limit sweets and desserts to only a few times a week. This includes sugar-sweetened drinks like soda. The Mediterranean diet may also include red wine with your meal-1 glass each day for women and up to 2 glasses a day for men. Tips for eating at home · Use herbs, spices, garlic, lemon zest, and citrus juice instead of salt to add flavor to foods. · Add avocado slices to your sandwich instead of silva. · Have fish for lunch or dinner instead of red meat. Brush the fish with olive oil, and broil or grill it. · Sprinkle your salad with seeds or nuts instead of cheese. · Cook with olive or canola oil instead of butter or oils that are high in saturated fat. · Switch from 2% milk or whole milk to 1% or fat-free milk. · Dip raw vegetables in a vinaigrette dressing or hummus instead of dips made from mayonnaise or sour cream. 
· Have a piece of fruit for dessert instead of a piece of cake. Try baked apples, or have some dried fruit. Tips for eating out · Try broiled, grilled, baked, or poached fish instead of having it fried or breaded. · Ask your  to have your meals prepared with olive oil instead of butter. · Order dishes made with marinara sauce or sauces made from olive oil. Avoid sauces made from cream or mayonnaise. · Choose whole-grain breads, whole wheat pasta and pizza crust, brown rice, beans, and lentils. · Cut back on butter or margarine on bread. Instead, you can dip your bread in a small amount of olive oil. · Ask for a side salad or grilled vegetables instead of french fries or chips. Where can you learn more? Go to http://valerie-vivian.info/. Enter 182-042-0357 in the search box to learn more about \"Learning About the Mediterranean Diet. \" Current as of: May 12, 2017 Content Version: 11.7 © 8671-8119 [employer]. Care instructions adapted under license by [employer] (which disclaims liability or warranty for this information). If you have questions about a medical condition or this instruction, always ask your healthcare professional. [place] [address] any warranty or liability for your use of this information. Introducing hospitals & HEALTH SERVICES! Nigel Dos Santos introduces Compound Time patient portal. Now you can access parts of your medical record, email your doctor's office, and request medication refills online. 1. In your internet browser, go to [email or web address]/Notice [email or web address] 2. Click on the First Time User? Click Here link in the Sign In box. You will see the New Member Sign Up page. 3. Enter your Compound Time Access Code exactly as it appears below. You will not need to use this code after youve completed the sign-up process. If you do not sign up before the expiration date, you must request a new code. · Compound Time Access Code: [email or web address] Expires: 12/24/2018 10:41 AM 
 
4. Enter the last four digits of your Social Security Number (xxxx) and Date of Birth (mm/dd/yyyy) as indicated and click Submit. You will be taken to the next sign-up page. 5. Create a Compound Time ID. This will be your Compound Time login ID and cannot be changed, so think of one that is secure and easy to remember. 6. Create a Compound Time password. You can change your password at any time. 7. Enter your Password Reset Question and Answer. This can be used at a later time if you forget your password. 8. Enter your e-mail address. You will receive e-mail notification when new information is available in [address] 9. Click Sign Up.  You can now view and download portions of your medical record. 10. Click the Download Summary menu link to download a portable copy of your medical information. If you have questions, please visit the Frequently Asked Questions section of the Perk website. Remember, Perk is NOT to be used for urgent needs. For medical emergencies, dial 911. Now available from your iPhone and Android! Please provide this summary of care documentation to your next provider. Your primary care clinician is listed as Mina Stewart. If you have any questions after today's visit, please call [phone].

## 2018-09-25 NOTE — PROGRESS NOTES
1. Have you been to the ER, urgent care clinic since your last visit? Hospitalized since your last visit? No    2. Have you seen or consulted any other health care providers outside of the [facility] since your last visit? Include any pap smears or colon screening. No    Chief Complaint   Patient presents with    Heart Problem     6 mo appt, discuss lab results. Denied cardiac symptoms.

## 2018-09-25 NOTE — PROGRESS NOTES
[facility]   [address] [facility], [address] [place] [address]                                          NEW PATIENT HPI/FOLLOW-UP      NAME:  Yajaira Gomez.   :   1945   MRN:   [id number]   PCP:  Sangeetha Crenshaw MD           Subjective: The patient is a 74y.o. year old male with h/o CAD s/p PTCA, dyslipidemia, DJD, chronic back pain and HTN who returns for a routine follow-up. Since the last visit, patient reports no new symptoms other than return. Denies change in exercise tolerance, chest pain, edema, medication intolerance, palpitations, shortness of breath, PND/orthopnea, wheezing, sputum, syncope, dizziness or light headedness. Doing satisfactorily. Review of Systems  General: Pt denies excessive weight gain or loss. Pt is able to conduct ADL's. Respiratory: Denies shortness of breath, ÁLVAREZ, wheezing or stridor.   Cardiovascular: Denies precordial pain, palpitations, edema or PND  Gastrointestinal: Denies poor appetite, indigestion, abdominal pain or blood in stool  Peripheral vascular: Denies claudication, leg cramps  Neuropsychiatric: Denies paresthesias,tingling,numbness,anxiety,depression,fatigue  Musculoskeletal: Denies pain,tenderness, soreness,swelling      Past Medical History:   Diagnosis Date    Asthma     hx asthma - no attack in 40+ yrs    Back pain     CAD (coronary artery disease)     Chest pain, unspecified     Chest pain, unspecified     Chest pain, unspecified     Coronary atherosclerosis of native coronary artery 2012    stent    Coronary atherosclerosis of native coronary artery     Essential hypertension     Essential hypertension, benign     Lumbar spinal stenosis 2013    SPINE LUMBAR LAMINECTOMY WITH INSTRUMENTATION - p tamiko    Mixed hyperlipidemia     Other ill-defined conditions(799.89)     HIGH CHOLESTEROL    Other malaise and fatigue     Pure hypercholesterolemia     S/P colonoscopy 2018 md hanh tics     Patient Active Problem List    Diagnosis Date Noted    Back pain     Chronic left-sided low back pain with sciatica 03/20/2018    S/P colonoscopy 01/17/2018    Drug therapy 06/02/2015    Fatigue 05/29/2015    Dizziness 04/03/2014    Encephalopathy, unspecified 08/13/2013    Intractable pain 08/03/2013    Right lumbar radiculopathy 08/03/2013    S/P PTCA (percutaneous transluminal coronary angioplasty) 08/03/2013    Chest pain, unspecified 03/05/2013    Essential hypertension, benign 03/05/2013    Dyslipidemia 03/05/2013    Esophageal reflux 03/05/2013    DJD (degenerative joint disease) 07/17/2012    Postsurgical percutaneous transluminal coronary angioplasty status 07/17/2012    Coronary atherosclerosis of native coronary artery 07/17/2012    Carotid sinus syndrome 07/17/2012      Past Surgical History:   Procedure Laterality Date    COLONOSCOPY N/A 1/17/2018    COLONOSCOPY performed by Shivam Cai MD at [facility] END[facility]COPY    HX [facility]  X2    HX GI      COLONOSCOPY    HX [address] (DR Kacy Martin)    HX LUMBAR LAMINECTOMY  8/2013    HX PTCA       Allergies   Allergen Reactions    Statins-Hmg-Coa Reductase Inhibitors Other (comments)     Body aches      Family History   Problem Relation Age of Onset    Cancer Mother      female    Anesth Problems Neg Hx       Social History     Social History    Marital status:      Spouse name: N/A    Number of children: N/A    Years of education: N/A     Occupational History    Not on file.      Social History Main Topics    Smoking status: Never Smoker    Smokeless tobacco: Never Used    Alcohol use No    Drug use: No    Sexual activity: Not Currently     Partners: Female     Other Topics Concern    Not on file     Social History Narrative    Medical History: BPHEDelevated PSACholesterolHypertensionMRI September 2, 2011 leftw bonnie convexity along the mid to low er lumbar spine    and a rightw bonnie convexity along the thoracolumbar junction. 2 the patient has multilevel degenerative disc disease. 3 there is moderate spinal    stenosis at L3-4 and L4-5 levels. In addition there is facet arthropathy and asymmetrical disc protrusions at L3-4 and L4-5 levels. There is a    narrow ing of the lateral recesses w ith the potential for extrinsic compression upon the origins of the L4 and the L5 nerve roots respectivelyCT    abdomen 2003 malrotation left kidney otherw ise normalCarotid sinus syndrome    Surgical History: colonoscopy by Brooklyn De Oliveira M.D. [id number] quiles md [id number] S1 laminectomy 2013    Hospitalization/Major Diagnostic Procedure: prolonged syncop'e 06    Family History: Mother: , cancerFather: , unknow nSister(s): aliveBrother(s): aliveDaughter(s): aliveGrandmother:    , natural causesGrandfather: , natural causes1 sister(s) - healthy. 3 son(s) , 3 daughter(s) . Social History: Alcohol Use Patient does not use alcohol. Smoking Status Patient is a never smoker. Caffeine: per day, coffee, soda, tea. Marital Status: . Lives w ith: alone. Children: yes, children. 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 Current Outpatient Prescriptions   Medication Sig    fenofibrate nanocrystallized (TRICOR) 48 mg tablet Take 3 Tabs by mouth daily. Indications: hyperlipidemia    tiZANidine (ZANAFLEX) 4 mg tablet TAKE 1 TABLET THREE TIMES A DAY.  colesevelam (WELCHOL) 625 mg tablet Take 3 Tabs by mouth two (2) times daily (with meals).  omeprazole (PRILOSEC) 20 mg capsule Take 20 mg by mouth daily.  lisinopril-hydroCHLOROthiazide (PRINZIDE, ZESTORETIC) 20-12.5 mg per tablet TAKE ONE TABLET BY MOUTH ONCE DAILY    DOCUSATE CALCIUM (STOOL SOFTENER PO) Take 100 mg by mouth as needed.  MULTIVITAMIN (MULTIPLE VITAMIN PO) Take  by mouth daily.     red yeast rice extract 600 mg cap Take 600 mg by mouth daily.  aspirin delayed-release 81 mg tablet Take 81 mg by mouth daily.  flaxseed 1,000 mg cap Take 1 Cap by mouth daily. No current facility-administered medications for this visit. I have reviewed the nurses notes, vitals, problem list, allergy list, medical history, family medical, social history and medications. Objective:     Physical Exam:     Vitals:    09/25/18 0958 09/25/18 1004   BP: 116/76 120/80   Pulse: 68    Resp: 18    SpO2: 99%    Weight: 166 lb 12.8 oz (75.7 kg)    Height: 5' 6\" (1.676 m)     Body mass index is 26.92 kg/(m^2). General: WDWN adult male, in no acute distress. Pleasant affect. HEENT: No carotid bruits, no JVD, trach is midline. Heart:  Normal S1/S2 negative S3 or S4. Regular, no murmur, gallop or rub.   Respiratory: Clear bilaterally, no wheezing or rales  Abdomen:   Soft, non-tender, bowel sounds are active.   Extremities:  No edema, normal cap refill, no cyanosis. Neuro: A&Ox3, speech clear, gait stable. Skin: Skin color is normal. No rashes or lesions. No diaphoresis. Vascular: 2+ pulses symmetric in all extremities      Data Review:       Cardiographics:    EKG interpretation:  Rhythm: normal sinus rhythm with 1st degree AV HB    Cardiology Labs:    Results for orders placed or performed during the hospital encounter of 08/03/13   EKG, 12 LEAD, INITIAL   Result Value Ref Range    Ventricular Rate 102 BPM    Atrial Rate 102 BPM    P-R Interval 150 ms    QRS Duration 84 ms    Q-T Interval 336 ms    QTC Calculation (Bezet) 437 ms    Calculated P Axis 64 degrees    Calculated R Axis -7 degrees    Calculated T Axis 53 degrees    Diagnosis       Sinus tachycardia  When compared with ECG of 04-AUG-2013 23:45,  Vent.  rate has increased BY  44 BPM  Nonspecific T wave abnormality no longer evident in Inferior leads  Confirmed by Flor Lopez M.D., Igor Bangura ([id number]) on 8/9/2013 12:47:22 PM       Lab Results   Component Value Date/Time Cholesterol, total 218 (H) 07/02/2018 08:47 AM    HDL Cholesterol 52 07/02/2018 08:47 AM    LDL, calculated 143 (H) 07/02/2018 08:47 AM    Triglyceride 113 07/02/2018 08:47 AM    CHOL/HDL Ratio 3.2 08/07/2013 04:24 AM       Lab Results   Component Value Date/Time    Sodium 141 03/20/2018 11:56 AM    Potassium 4.4 03/20/2018 11:56 AM    Chloride 102 03/20/2018 11:56 AM    CO2 26 03/20/2018 11:56 AM    Anion gap 7 01/10/2014 05:17 PM    Glucose 89 03/20/2018 11:56 AM    BUN 21 03/20/2018 11:56 AM    Creatinine 1.45 (H) 03/20/2018 11:56 AM    BUN/Creatinine ratio 14 03/20/2018 11:56 AM    GFR est AA 54 (L) 03/20/2018 11:56 AM    GFR est non-AA 47 (L) 03/20/2018 11:56 AM    Calcium 9.8 03/20/2018 11:56 AM    Bilirubin, total 0.5 03/20/2018 11:56 AM    AST (SGOT) 31 03/20/2018 11:56 AM    Alk. phosphatase 57 03/20/2018 11:56 AM    Protein, total 7.2 03/20/2018 11:56 AM    Albumin 4.8 03/20/2018 11:56 AM    Globulin 3.5 01/10/2014 05:17 PM    A-G Ratio 2.0 03/20/2018 11:56 AM    ALT (SGPT) 48 (H) 03/20/2018 11:56 AM          Assessment:       ICD-10-CM ICD-9-CM    1. Essential hypertension, benign I10 401.1 AMB POC EKG ROUTINE W/ 12 LEADS, INTER & REP   2. Dyslipidemia E78.5 272.4 AMB POC EKG ROUTINE W/ 12 LEADS, INTER & REP      fenofibrate nanocrystallized (TRICOR) 48 mg tablet      LIPID PANEL      CK      METABOLIC PANEL, COMPREHENSIVE   3. Atherosclerosis of native coronary artery of native heart without angina pectoris I25.10 414.01    4. Chronic left-sided low back pain with left-sided sciatica M54.42 724.2     G89.29 724.3      338.29    5. S/P PTCA (percutaneous transluminal coronary angioplasty) Z98.61 V45.82          Discussion: Patient presents at this time stable from a cardiac perspective. BP was well controlled. Lipids a little improved (July 2018) but still well above normal; cannot tolerate statins. Overall pleased with present status.     Discussed/seen with Dr. Brijesh Chowdhury:   -- INCREASE Tricor to 145 mg daily  -- Watch the amount of refined carbohydrates you eat: bread, pasta, cookies, etc.  -- Eat a heart healthy diet like the Mediterranean diet printed below; this will help with your cholesterol levels    -- We will repeat cholesterol labs in January 2019    --Follow up: labs in January 2019   -- Office visit 6 months   --  Call with questions or concerns. I have discussed the diagnosis with the patient and the intended plan as seen in the above orders. The patient has received an after-visit summary and questions were answered concerning future plans. I have discussed any concerning medication side effects and warnings with the patient as well. Durga Tang PA-C  9/25/2018     Patient seen and examined. All pertinent data reviewed. I have reviewed detailed note as outlined by Kemal uGerrier. Case discussed with Nursing/medical assistant staff and Kemal Guerrier. Patient presents cardiac stable from symptom standpoint. However,lipids still not at goal. Discussed PCKS9 agents too expensive even discounted. Will increase Tricor dose. Plans as outlined. Asael Rader

## 2018-09-26 ENCOUNTER — TELEPHONE (OUTPATIENT)
Dept: CARDIOLOGY CLINIC | Age: 73
End: 2018-09-26

## 2018-09-26 LAB
ALBUMIN SERPL-MCNC: 4.5 G/DL (ref 3.5–4.8)
ALBUMIN/GLOB SERPL: 1.8 {RATIO} (ref 1.2–2.2)
ALP SERPL-CCNC: 51 IU/L (ref 39–117)
ALT SERPL-CCNC: 30 IU/L (ref 0–44)
AST SERPL-CCNC: 28 IU/L (ref 0–40)
BILIRUB SERPL-MCNC: 0.6 MG/DL (ref 0–1.2)
BUN SERPL-MCNC: 16 MG/DL (ref 8–27)
BUN/CREAT SERPL: 12 (ref 10–24)
CALCIUM SERPL-MCNC: 9.7 MG/DL (ref 8.6–10.2)
CHLORIDE SERPL-SCNC: 104 MMOL/L (ref 96–106)
CHOLEST SERPL-MCNC: 209 MG/DL (ref 100–199)
CK SERPL-CCNC: 441 U/L (ref 24–204)
CO2 SERPL-SCNC: 24 MMOL/L (ref 20–29)
CREAT SERPL-MCNC: 1.37 MG/DL (ref 0.76–1.27)
GLOBULIN SER CALC-MCNC: 2.5 G/DL (ref 1.5–4.5)
GLUCOSE SERPL-MCNC: 83 MG/DL (ref 65–99)
HDLC SERPL-MCNC: 45 MG/DL
LDLC SERPL CALC-MCNC: 142 MG/DL (ref 0–99)
POTASSIUM SERPL-SCNC: 4.9 MMOL/L (ref 3.5–5.2)
PROT SERPL-MCNC: 7 G/DL (ref 6–8.5)
SODIUM SERPL-SCNC: 141 MMOL/L (ref 134–144)
TRIGL SERPL-MCNC: 112 MG/DL (ref 0–149)
VLDLC SERPL CALC-MCNC: 22 MG/DL (ref 5–40)

## 2018-09-26 NOTE — TELEPHONE ENCOUNTER
----- Message from Brea Panchal MD sent at 9/26/2018 10:41 AM EDT -----  Regarding: LIPIDS,CPK,LIVER  THE SAME    WILL SEE HOW HE DOES ON INCREASED DOSE OF TRICOR IN 3 MONTHS WITH LABS ORDERED    THX    B  ----- Message -----     From: [facility] Lab Results In     Sent: 9/26/2018   5:40 AM       To: Brea Panchal MD

## 2018-09-26 NOTE — TELEPHONE ENCOUNTER
Spoke with patient. Verified patient with two patient identifiers. Advised labs the same. Increase Tricor as instructed. Repeat fasting labs in 3 months. Patient verbalized understanding.

## 2018-10-03 ENCOUNTER — OFFICE VISIT (OUTPATIENT)
Dept: INTERNAL MEDICINE CLINIC | Age: 73
End: 2018-10-03

## 2018-10-03 VITALS
SYSTOLIC BLOOD PRESSURE: 101 MMHG | WEIGHT: 164.1 LBS | BODY MASS INDEX: 26.37 KG/M2 | RESPIRATION RATE: 18 BRPM | HEART RATE: 71 BPM | HEIGHT: 66 IN | TEMPERATURE: 97.4 F | OXYGEN SATURATION: 96 % | DIASTOLIC BLOOD PRESSURE: 67 MMHG

## 2018-10-03 DIAGNOSIS — R52 INTRACTABLE PAIN: Primary | ICD-10-CM

## 2018-10-03 DIAGNOSIS — G89.29 CHRONIC LEFT-SIDED LOW BACK PAIN WITH LEFT-SIDED SCIATICA: ICD-10-CM

## 2018-10-03 DIAGNOSIS — I25.10 ATHEROSCLEROSIS OF NATIVE CORONARY ARTERY OF NATIVE HEART WITHOUT ANGINA PECTORIS: ICD-10-CM

## 2018-10-03 DIAGNOSIS — M15.9 PRIMARY OSTEOARTHRITIS INVOLVING MULTIPLE JOINTS: ICD-10-CM

## 2018-10-03 DIAGNOSIS — M54.42 CHRONIC LEFT-SIDED LOW BACK PAIN WITH LEFT-SIDED SCIATICA: ICD-10-CM

## 2018-10-03 DIAGNOSIS — M54.16 RIGHT LUMBAR RADICULOPATHY: ICD-10-CM

## 2018-10-03 NOTE — PROGRESS NOTES
1. Have you been to the ER, urgent care clinic since your last visit? Hospitalized since your last visit? No 
 
2. Have you seen or consulted any other health care providers outside of the [address] [place] [address] since your last visit? Include any pap smears or colon screening. No  
 
back pain

## 2018-10-03 NOTE — PROGRESS NOTES
[facility] Jaye Joel MD, [address] [address] Phone:  [phone]  Fax: [phone] Chief Complaint Patient presents with  Hypertension SUBECTIVE: 
 
Puja Valdovinos is a 74 y.o. male Patient returns today with known history of back pain, coronary artery disease, followed by cardiovascular, lumbar spinal stenosis, dyslipidemia, intolerant of statins, and is seen for evaluation. Patient returns today continuing to have back pain. He woke up this morning, had pain in the lower part of the back with radiation to the left. At times he just has pain in the middle part of his back. He's had laminectomies x two in the thoracic area and laminectomy x one in the lumbar area. At times he just gets a cramp in his legs when he has the discomfort. Patient has Zanaflex for muscle relaxant and is seen for evaluation. Current Outpatient Prescriptions Medication Sig Dispense Refill  fenofibrate nanocrystallized (TRICOR) 48 mg tablet Take 3 Tabs by mouth daily. Indications: hyperlipidemia 90 Tab 3  
 tiZANidine (ZANAFLEX) 4 mg tablet TAKE 1 TABLET THREE TIMES A DAY. 60 Tab 1  
 omeprazole (PRILOSEC) 20 mg capsule Take 20 mg by mouth daily.  lisinopril-hydroCHLOROthiazide (PRINZIDE, ZESTORETIC) 20-12.5 mg per tablet TAKE ONE TABLET BY MOUTH ONCE DAILY 90 Tab 1  
 DOCUSATE CALCIUM (STOOL SOFTENER PO) Take 100 mg by mouth as needed.  MULTIVITAMIN (MULTIPLE VITAMIN PO) Take  by mouth daily.  red yeast rice extract 600 mg cap Take 600 mg by mouth daily.  aspirin delayed-release 81 mg tablet Take 81 mg by mouth daily.  flaxseed 1,000 mg cap Take 1 Cap by mouth daily.  colesevelam (WELCHOL) 625 mg tablet Take 3 Tabs by mouth two (2) times daily (with meals). 180 Tab 6 Past Medical History:  
Diagnosis Date  Asthma   
 hx asthma - no attack in 40+ yrs  Back pain  CAD (coronary artery disease)  Chest pain, unspecified  Chest pain, unspecified  Chest pain, unspecified  Coronary atherosclerosis of native coronary artery 07/17/2012  
 stent  Coronary atherosclerosis of native coronary artery  Essential hypertension  Essential hypertension, benign  Lumbar spinal stenosis 08/03/2013 SPINE LUMBAR LAMINECTOMY WITH INSTRUMENTATION - p tamiko  Mixed hyperlipidemia  Other ill-defined conditions(799.89) HIGH CHOLESTEROL  
 Other malaise and fatigue  Pure hypercholesterolemia  S/P colonoscopy 01/17/2018  
 md hanh tics Past Surgical History:  
Procedure Laterality Date  COLONOSCOPY N/A 1/17/2018 COLONOSCOPY performed by Sveta Sebastian MD at [address] [facility]  X2  
 HX GI    
 COLONOSCOPY  
 HX HEART CATHETERIZATION    
 STENT (DR Urmila Fernández)  HX LUMBAR LAMINECTOMY  8/2013  HX PTCA Allergies Allergen Reactions  Statins-Hmg-Coa Reductase Inhibitors Other (comments) Body aches REVIEW OF SYSTEMS: 
 Patient is unable to sit for prolonged periods of time, in fact has to leave [employer] early as he's not able to sit that long. He wants something else done for his back. Social History Social History  Marital status:  Spouse name: N/A  
 Number of children: N/A  
 Years of education: N/A Social History Main Topics  Smoking status: Never Smoker  Smokeless tobacco: Never Used  Alcohol use No  
 Drug use: No  
 Sexual activity: Not Currently Partners: Female Other Topics Concern  None Social History Narrative Medical History: BPHEDelevated PSACholesterolHypertensionMRI September 2, 2011 leftw bonnie convexity along the mid to low er lumbar spine  
 and a rightw bonnie convexity along the thoracolumbar junction. 2 the patient has multilevel degenerative disc disease.  3 there is moderate spinal  
 stenosis at L3-4 and L4-5 levels. In addition there is facet arthropathy and asymmetrical disc protrusions at L3-4 and L4-5 levels. There is a  
 narrow ing of the lateral recesses w ith the potential for extrinsic compression upon the origins of the L4 and the L5 nerve roots respectivelyCT  
 abdomen 2003 malrotation left kidney otherw ise normalCarotid sinus syndrome Surgical History: colonoscopy by Kade Wilkinson M.D. [id number] - md kb [id number] S1 laminectomy 2013 Hospitalization/Major Diagnostic Procedure: prolonged syncop'e 06 Family History: Mother: , cancerFather: , unknow nSister(s): aliveBrother(s): aliveDaughter(s): aliveGrandmother:  
 , natural causesGrandfather: , natural causes1 sister(s) - healthy. 3 son(s) , 3 daughter(s) . Social History: Alcohol Use Patient does not use alcohol. Smoking Status Patient is a never smoker. Caffeine: per day, coffee, soda, tea. Marital Status: . Lives w ith: alone. Children: yes, children. 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  
r 
Family History Problem Relation Age of Onset  Cancer Mother   
  female  Anesth Problems Neg Hx OBJECTIVE: 
Visit Vitals  /67  Pulse 71  Temp 97.4 °F (36.3 °C) (Oral)  Resp 18  Ht 5' 6\" (1.676 m)  Wt 164 lb 1.6 oz (74.4 kg)  SpO2 96%  BMI 26.49 kg/m2 ENT: perrla,  eom intact NECK: supple. Thyroid normal 
CHEST: clear to ascultation and percussion HEART: regular rate and rhythm ABD: soft, bowel sounds active EXTREMITIES: no edema, pulse 1+ Office Visit on 2018 Component Date Value Ref Range Status  Cholesterol, total 2018 209* 100 - 199 mg/dL Final  
 Triglyceride 2018 112  0 - 149 mg/dL Final  
 HDL Cholesterol 2018 45  >39 mg/dL Final  
 VLDL, calculated 2018 22  5 - 40 mg/dL Final  
  LDL, calculated 09/25/2018 142* 0 - 99 mg/dL Final  
 Creatine Kinase,Total 09/25/2018 441* 24 - 204 U/L Final  
 Glucose 09/25/2018 83  65 - 99 mg/dL Final  
 BUN 09/25/2018 16  8 - 27 mg/dL Final  
 Creatinine 09/25/2018 1.37* 0.76 - 1.27 mg/dL Final  
 GFR est non-AA 09/25/2018 50* >59 mL/min/1.73 Final  
 GFR est AA 09/25/2018 58* >59 mL/min/1.73 Final  
 BUN/Creatinine ratio 09/25/2018 12  10 - 24 Final  
 Sodium 09/25/2018 141  134 - 144 mmol/L Final  
 Potassium 09/25/2018 4.9  3.5 - 5.2 mmol/L Final  
 Chloride 09/25/2018 104  96 - 106 mmol/L Final  
 CO2 09/25/2018 24  20 - 29 mmol/L Final  
 Calcium 09/25/2018 9.7  8.6 - 10.2 mg/dL Final  
 Protein, total 09/25/2018 7.0  6.0 - 8.5 g/dL Final  
 Albumin 09/25/2018 4.5  3.5 - 4.8 g/dL Final  
 GLOBULIN, TOTAL 09/25/2018 2.5  1.5 - 4.5 g/dL Final  
 A-G Ratio 09/25/2018 1.8  1.2 - 2.2 Final  
 Bilirubin, total 09/25/2018 0.6  0.0 - 1.2 mg/dL Final  
 Alk. phosphatase 09/25/2018 51  39 - 117 IU/L Final  
 AST (SGOT) 09/25/2018 28  0 - 40 IU/L Final  
 ALT (SGPT) 09/25/2018 30  0 - 44 IU/L Final  
Orders Only on 07/05/2018 Component Date Value Ref Range Status  Cholesterol, total 07/02/2018 218* 100 - 199 mg/dL Final  
 Triglyceride 07/02/2018 113  0 - 149 mg/dL Final  
 HDL Cholesterol 07/02/2018 52  >39 mg/dL Final  
 VLDL, calculated 07/02/2018 23  5 - 40 mg/dL Final  
 LDL, calculated 07/02/2018 143* 0 - 99 mg/dL Final  
 
  
 
ASSESSMENT: 
1. Intractable pain 2. Right lumbar radiculopathy 3. Chronic left-sided low back pain with left-sided sciatica 4. Primary osteoarthritis involving multiple joints 5. Atherosclerosis of native coronary artery of native heart without angina pectoris We offer him referrals to orthopedics for back, physiatrist for his back, but he just prefers to see a pain management physician. We therefore give him referral to pain management.   We advise him if his insurance doesn't pay for the pain management physician that we refer him to that he will need to call his insurance company to find out what pain management doctor they do pay for and will give him referral to that physician. BP control is at goal.  His cardiac status is stable. No chest pain, no shortness of breath. His quality of life is extremely poor because of almost continuous lumbar and back discomfort. He'll be back to see us in four months or so. I have discussed the diagnosis with the patient and the intended plan as seen in the 
orders above. The patient understands and agees with the plan. The patient has  
received an after visit summary and questions were answered concerning 
future plans Patient labs and/or xrays were reviewed Past records were reviewed. PLAN: 
. Orders Placed This Encounter  REFERRAL TO PAIN MANAGEMENT Follow-up Disposition: 
Return in about 4 months (around 2/3/2019). ATTENTION:  
This medical record was transcribed using an electronic medical records system. Although proofread, it may and can contain electronic and spelling errors. Other human spelling and other errors may be present. Corrections may be executed at a later time. Please feel free to contact us for any clarifications as needed.

## 2018-10-03 NOTE — MR AVS SNAPSHOT
Armoarmani Sabine 
 
 
 [id number] 
[phone] Patient: Landy Rodriguez Sr. MRN: [id number] STA:42/[birth year]45 Visit Information Date & Time Provider Department Dept. Phone Encounter #  
 10/3/2018 12:30 PM Jovany De Leon MD [facility] Medicine and Primary Care [phone] [id number] Follow-up Instructions Return in about 4 months (around 2/3/2019). Follow-up and Disposition History Your Appointments 2/4/2019  9:15 AM  
Any with Jovany De Leon MD  
[address] [place] [address] and Primary Care [address] [facility]) Appt Note: 4 month f/u  
 [address] [facility]  
  
   
 [address] [id number]  
  
    
 3/26/2019  9:15 AM  
ESTABLISHED PATIENT with Ernesto Brambila MD  
[facility]) Appt Note: SIX MONTH FOLLOW UP- [place] [id number] [address]  
[phone] [address] [facility] [address] Upcoming Health Maintenance Date Due  
 GLAUCOMA SCREENING Q2Y 10/25/2018* Shingrix Vaccine Age 50> (1 of 2) 10/3/2019* Influenza Age 5 to Adult 10/3/2019* MEDICARE YEARLY EXAM 3/24/2019 COLONOSCOPY 1/17/2028 DTaP/Tdap/Td series (2 - Td) 3/23/2028 *Topic was postponed. The date shown is not the original due date. Allergies as of 10/3/2018  Review Complete On: 10/3/2018 By: Jovany De Leon MD  
  
 Severity Noted Reaction Type Reactions Statins-hmg-coa Reductase Inhibitors  07/17/2012    Other (comments) Body aches Current Immunizations  Reviewed on 8/12/2013 Name Date Pneumococcal Polysaccharide (PPSV-23) 8/13/2013  1:04 PM  
  
 Not reviewed this visit You Were Diagnosed With   
  
 Codes Comments Intractable pain    -  Primary ICD-10-CM: X05 ICD-9-CM: 780.96   
 Right lumbar radiculopathy     ICD-10-CM: M54.16 
ICD-9-CM: 724.4 Chronic left-sided low back pain with left-sided sciatica     ICD-10-CM: M54.42, G89.29 ICD-9-CM: 724.2, 724.3, 338.29 Primary osteoarthritis involving multiple joints     ICD-10-CM: M15.0 ICD-9-CM: 715.09 Atherosclerosis of native coronary artery of native heart without angina pectoris     ICD-10-CM: I25.10 ICD-9-CM: 414.01 Vitals BP Pulse Temp Resp Height(growth percentile) Weight(growth percentile) 101/67 71 97.4 °F (36.3 °C) (Oral) 18 5' 6\" (1.676 m) 164 lb 1.6 oz (74.4 kg) SpO2 BMI Smoking Status 96% 26.49 kg/m2 Never Smoker Vitals History BMI and BSA Data Body Mass Index Body Surface Area  
 26.49 kg/m 2 1.86 m 2 Preferred Pharmacy Pharmacy Name Phone [facility] [address] [place] [address] [phone] Your Updated Medication List  
  
   
This list is accurate as of 10/3/18  3:58 PM.  Always use your most recent med list.  
  
  
  
  
 aspirin delayed-release 81 mg tablet Take 81 mg by mouth daily. colesevelam 625 mg tablet Commonly known as:  [facility] Take 3 Tabs by mouth two (2) times daily (with meals). fenofibrate nanocrystallized 48 mg tablet Commonly known as:  Ezzard Stephan Take 3 Tabs by mouth daily. Indications: hyperlipidemia  
  
 flaxseed 1,000 mg Cap Take 1 Cap by mouth daily. lisinopril-hydroCHLOROthiazide 20-12.5 mg per tablet Commonly known as:  PRINZIDE, ZESTORETIC  
TAKE ONE TABLET BY MOUTH ONCE DAILY MULTIPLE VITAMIN PO Take  by mouth daily. omeprazole 20 mg capsule Commonly known as:  PRILOSEC Take 20 mg by mouth daily. red yeast rice extract 600 mg Cap Take 600 mg by mouth daily. STOOL SOFTENER PO Take 100 mg by mouth as needed. tiZANidine 4 mg tablet Commonly known as:  Tad Barragan TAKE 1 TABLET THREE TIMES A DAY. We Performed the Following REFERRAL TO PAIN MANAGEMENT [ZOQ809 Custom] Follow-up Instructions Return in about 4 months (around 2/3/2019). Referral Information Referral ID Referred By Referred To  
  
 [id number] Odette GLOVER Not Available Visits Status Start Date End Date 1 New Request 10/3/18 10/3/19 If your referral has a status of pending review or denied, additional information will be sent to support the outcome of this decision. Introducing [facility]! [facility] introduces [employer] patient portal. Now you can access parts of your medical record, email your doctor's office, and request medication refills online. 1. In your internet browser, go to [email or web address] 2. Click on the First Time User? Click Here link in the Sign In box. You will see the New Member Sign Up page. 3. Enter your [employer] Access Code exactly as it appears below. You will not need to use this code after youve completed the sign-up process. If you do not sign up before the expiration date, you must request a new code. · [employer] Access Code: [email or web address] Expires: 12/24/2018 10:41 AM 
 
4. Enter the last four digits of your Social Security Number (xxxx) and Date of Birth (mm/dd/yyyy) as indicated and click Submit. You will be taken to the next sign-up page. 5. Create a [employer] ID. This will be your [employer] login ID and cannot be changed, so think of one that is secure and easy to remember. 6. Create a [employer] password. You can change your password at any time. 7. Enter your Password Reset Question and Answer. This can be used at a later time if you forget your password. 8. Enter your e-mail address. You will receive e-mail notification when new information is available in [address] 9. Click Sign Up. You can now view and download portions of your medical record.  
10. Click the Download Summary menu link to download a portable copy of your medical information. If you have questions, please visit the Frequently Asked Questions section of the [employer] website. Remember, [employer] is NOT to be used for urgent needs. For medical emergencies, dial 911. Now available from your iPhone and Android! Please provide this summary of care documentation to your next provider. Your primary care clinician is listed as Riaz Raymond. If you have any questions after today's visit, please call [phone].

## 2018-10-22 RX ORDER — LISINOPRIL AND HYDROCHLOROTHIAZIDE 12.5; 2 MG/1; MG/1
TABLET ORAL
Qty: 90 TAB | Refills: 1 | Status: SHIPPED | OUTPATIENT
Start: 2018-10-22 | End: 2019-04-23 | Stop reason: SDUPTHER

## 2018-11-06 ENCOUNTER — HOSPITAL ENCOUNTER (OUTPATIENT)
Dept: GENERAL RADIOLOGY | Age: 73
Discharge: HOME | End: 2018-11-06
Attending: PAIN MEDICINE
Payer: MEDICARE

## 2018-11-06 ENCOUNTER — HOSPITAL ENCOUNTER (OUTPATIENT)
Dept: MRI IMAGING | Age: 73
Discharge: HOME | End: 2018-11-06
Attending: PAIN MEDICINE
Payer: MEDICARE

## 2018-11-06 VITALS — BODY MASS INDEX: 27.12 KG/M2 | WEIGHT: 168 LBS

## 2018-11-06 DIAGNOSIS — M62.838 SPASM OF MUSCLE: ICD-10-CM

## 2018-11-06 DIAGNOSIS — M51.16 INTERVERTEBRAL DISC DISORDER WITH RADICULOPATHY OF LUMBAR REGION: ICD-10-CM

## 2018-11-06 DIAGNOSIS — M47.26 OTHER SPONDYLOSIS WITH RADICULOPATHY, LUMBAR REGION: ICD-10-CM

## 2018-11-06 DIAGNOSIS — M96.1 POSTLAMINECTOMY SYNDROME, LUMBAR REGION: ICD-10-CM

## 2018-11-06 PROCEDURE — 72158 MRI LUMBAR SPINE W/O & W/DYE: CPT

## 2018-11-06 PROCEDURE — A9575 INJ GADOTERATE MEGLUMI 0.1ML: HCPCS | Performed by: PAIN MEDICINE

## 2018-11-06 PROCEDURE — 74011250636 HC RX REV CODE- 250/636: Performed by: PAIN MEDICINE

## 2018-11-06 RX ORDER — GADOTERATE MEGLUMINE 376.9 MG/ML
15 INJECTION INTRAVENOUS
Status: COMPLETED | OUTPATIENT
Start: 2018-11-06 | End: 2018-11-06

## 2018-11-06 RX ADMIN — GADOTERATE MEGLUMINE 15 ML: 376.9 INJECTION INTRAVENOUS at 12:38

## 2018-11-07 ENCOUNTER — HOSPITAL ENCOUNTER (OUTPATIENT)
Dept: GENERAL RADIOLOGY | Age: 73
Discharge: HOME | End: 2018-11-07
Payer: MEDICARE

## 2018-11-07 DIAGNOSIS — R52 PAIN: ICD-10-CM

## 2018-11-07 PROCEDURE — 72114 X-RAY EXAM L-S SPINE BENDING: CPT

## 2019-01-10 DIAGNOSIS — E78.5 DYSLIPIDEMIA: ICD-10-CM

## 2019-01-14 RX ORDER — FENOFIBRATE 48 MG/1
TABLET, COATED ORAL
Qty: 30 TAB | Refills: 11 | Status: SHIPPED | OUTPATIENT
Start: 2019-01-14 | End: 2019-04-02

## 2019-02-04 ENCOUNTER — OFFICE VISIT (OUTPATIENT)
Dept: INTERNAL MEDICINE CLINIC | Age: 74
End: 2019-02-04

## 2019-02-04 VITALS
WEIGHT: 158.9 LBS | DIASTOLIC BLOOD PRESSURE: 73 MMHG | SYSTOLIC BLOOD PRESSURE: 107 MMHG | BODY MASS INDEX: 25.54 KG/M2 | HEIGHT: 66 IN | RESPIRATION RATE: 17 BRPM | OXYGEN SATURATION: 96 % | HEART RATE: 64 BPM | TEMPERATURE: 97.5 F

## 2019-02-04 DIAGNOSIS — M54.42 CHRONIC LEFT-SIDED LOW BACK PAIN WITH LEFT-SIDED SCIATICA: ICD-10-CM

## 2019-02-04 DIAGNOSIS — I25.10 ATHEROSCLEROSIS OF NATIVE CORONARY ARTERY OF NATIVE HEART WITHOUT ANGINA PECTORIS: ICD-10-CM

## 2019-02-04 DIAGNOSIS — K21.9 GASTROESOPHAGEAL REFLUX DISEASE WITHOUT ESOPHAGITIS: ICD-10-CM

## 2019-02-04 DIAGNOSIS — I10 ESSENTIAL HYPERTENSION, BENIGN: Primary | ICD-10-CM

## 2019-02-04 DIAGNOSIS — G89.29 CHRONIC LEFT-SIDED LOW BACK PAIN WITH LEFT-SIDED SCIATICA: ICD-10-CM

## 2019-02-04 DIAGNOSIS — M15.9 PRIMARY OSTEOARTHRITIS INVOLVING MULTIPLE JOINTS: ICD-10-CM

## 2019-02-04 RX ORDER — COLESEVELAM 180 1/1
1875 TABLET ORAL 2 TIMES DAILY WITH MEALS
Qty: 180 TAB | Refills: 6 | Status: SHIPPED | OUTPATIENT
Start: 2019-02-04 | End: 2020-05-01

## 2019-02-04 NOTE — PROGRESS NOTES
Chief Complaint Patient presents with  Back Pain  
  follow up 1. Have you been to the ER, urgent care clinic since your last visit? Hospitalized since your last visit? No 
 
2. Have you seen or consulted any other health care providers outside of the [address] [place] [address] since your last visit? Include any pap smears or colon screening.  No

## 2019-02-04 NOTE — PROGRESS NOTES
SPORTS MEDICINE AND PRIMARY CARE Slim Marquez MD, [address] [place] [address] Phone:  [phone]  Fax: [phone] Chief Complaint Patient presents with  Back Pain  
  follow up Erlin Perry SUBJECTIVE: 
  Harlan Alcala. is a 74 y.o. male Patient returns today with known history of primary hypertension, coronary artery disease, osteoarthritis, GERD, chronic low back pain, with MRI of the lumbar spine in November that revealed multilevel degenerative changes, decompression and fusion with progressive degenerative changes of T12-L1, L1-L2 and L2-L3. Patient is seen for evaluation. Patient returns today saying that he had by Dr. Moe Polk recollection two epidurals. He feels he has had eight shots, actually two episodes. After the last one he does note some improvement. He, however, is not doing any lifting and not doing anything to aggravate the discomfort. We recall that Ngozi Bates on 08/03/13 did a lumbar laminectomy with instrumentation. Patient comes in today for follow up. He has been seeing Dr. August Frey on a regular basis. Current Outpatient Medications Medication Sig Dispense Refill  colesevelam (WELCHOL) 625 mg tablet Take 3 Tabs by mouth two (2) times daily (with meals). 180 Tab 6  
 fenofibrate nanocrystallized (TRICOR) 48 mg tablet TAKE ONE TABLET BY MOUTH ONCE DAILY 30 Tab 11  
 lisinopril-hydroCHLOROthiazide (PRINZIDE, ZESTORETIC) 20-12.5 mg per tablet TAKE ONE TABLET BY MOUTH ONCE DAILY 90 Tab 1  
 omeprazole (PRILOSEC) 20 mg capsule Take 20 mg by mouth daily.  DOCUSATE CALCIUM (STOOL SOFTENER PO) Take 100 mg by mouth as needed.  red yeast rice extract 600 mg cap Take 600 mg by mouth daily.  aspirin delayed-release 81 mg tablet Take 81 mg by mouth daily.  flaxseed 1,000 mg cap Take 1 Cap by mouth daily.  tiZANidine (ZANAFLEX) 4 mg tablet TAKE 1 TABLET THREE TIMES A DAY.  60 Tab 1  
  MULTIVITAMIN (MULTIPLE VITAMIN PO) Take  by mouth daily. Past Medical History:  
Diagnosis Date  Asthma   
 hx asthma - no attack in 40+ yrs  Back pain  CAD (coronary artery disease)  Chest pain, unspecified  Chest pain, unspecified  Chest pain, unspecified  Coronary atherosclerosis of native coronary artery 07/17/2012  
 stent  Coronary atherosclerosis of native coronary artery  Essential hypertension  Essential hypertension, benign  Lumbar spinal stenosis 08/03/2013 SPINE LUMBAR LAMINECTOMY WITH INSTRUMENTATION - p tamiko  Mixed hyperlipidemia  Other ill-defined conditions(799.89) HIGH CHOLESTEROL  
 Other malaise and fatigue  Pure hypercholesterolemia  S/P colonoscopy 01/17/2018  
 md hanh tics Past Surgical History:  
Procedure Laterality Date  COLONOSCOPY N/A 1/17/2018 COLONOSCOPY performed by Lola Kline MD at [address] [facility]  X2  
 HX GI    
 COLONOSCOPY  
 HX HEART CATHETERIZATION    
 STENT (DR Sanjeev García)  HX LUMBAR LAMINECTOMY  8/2013  HX PTCA Allergies Allergen Reactions  Statins-Hmg-Coa Reductase Inhibitors Other (comments) Body aches REVIEW OF SYSTEMS: 
General: negative for - chills or fever ENT: negative for - headaches, nasal congestion or tinnitus Respiratory: negative for - cough, hemoptysis, shortness of breath or wheezing Cardiovascular : negative for - chest pain, edema, palpitations or shortness of breath Gastrointestinal: negative for - abdominal pain, blood in stools, heartburn or nausea/vomiting Genito-Urinary: no dysuria, trouble voiding, or hematuria Musculoskeletal: negative for - gait disturbance, joint pain, joint stiffness or joint swelling Neurological: no TIA or stroke symptoms Hematologic: no bruises, no bleeding, no swollen glands Integument: no lumps, mole changes, nail changes or rash Endocrine: no malaise/lethargy or unexpected weight changes Social History Socioeconomic History  Marital status:  Spouse name: Not on file  Number of children: Not on file  Years of education: Not on file  Highest education level: Not on file Tobacco Use  Smoking status: Never Smoker  Smokeless tobacco: Never Used Substance and Sexual Activity  Alcohol use: No  
 Drug use: No  
 Sexual activity: Not Currently Partners: Female Social History Narrative Medical History: BPHEDelevated PSACholesterolHypertensionMRI 2011 leftw bonnie convexity along the mid to low er lumbar spine  
 and a rightw bonnie convexity along the thoracolumbar junction. 2 the patient has multilevel degenerative disc disease. 3 there is moderate spinal  
 stenosis at L3-4 and L4-5 levels. In addition there is facet arthropathy and asymmetrical disc protrusions at L3-4 and L4-5 levels. There is a  
 narrow ing of the lateral recesses w ith the potential for extrinsic compression upon the origins of the L4 and the L5 nerve roots respectivelyCT  
 abdomen 2003 malrotation left kidney otherw ise normalCarotid sinus syndrome Surgical History: colonoscopy by Meg Mark M.D. [id number] quiles md [id number] S1 laminectomy 2013 Hospitalization/Major Diagnostic Procedure: prolonged syncop'e 06 Family History: Mother: , cancerFather: , unknow nSister(s): aliveBrother(s): aliveDaughter(s): aliveGrandmother:  
 , natural causesGrandfather: , natural causes1 sister(s) - healthy. 3 son(s) , 3 daughter(s) . Social History: Alcohol Use Patient does not use alcohol. Smoking Status Patient is a never smoker. Caffeine: per day, coffee, soda, tea. Marital Status: . Lives w ith: alone. Children: yes, children. 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 Family History Problem Relation Age of Onset  Cancer Mother   
     female  Anesth Problems Neg Hx OBJECTIVE: 
 
Visit Vitals /73 Pulse 64 Temp 97.5 °F (36.4 °C) (Oral) Resp 17 Ht 5' 6\" (1.676 m) Wt 158 lb 14.4 oz (72.1 kg) SpO2 96% BMI 25.65 kg/m² CONSTITUTIONAL: well , well nourished, appears age appropriate EYES: perrla, eom intact ENMT:moist mucous membranes, pharynx clear NECK: supple. Thyroid normal 
RESPIRATORY: Chest: clear bilaterally CARDIOVASCULAR: Heart: regular rate and rhythm GASTROINTESTINAL: Abdomen: soft, bowel sounds active HEMATOLOGIC: no pathological lymph nodes palpated MUSCULOSKELETAL: Extremities: no edema, pulse 1+ INTEGUMENT: No unusual rashes or suspicious skin lesions noted. Nails appear normal. 
NEUROLOGIC: non-focal exam  
MENTAL STATUS: alert and oriented, appropriate affect ASSESSMENT: 
1. Essential hypertension, benign 2. Atherosclerosis of native coronary artery of native heart without angina pectoris 3. Primary osteoarthritis involving multiple joints 4. Gastroesophageal reflux disease without esophagitis 5. Chronic left-sided low back pain with left-sided sciatica Patient requested a referral slip, but it was never obtained. Dr. Florence Borrego office was kind enough to see him after reviewing his insurance alone. Patient's medical status is otherwise stable. His blood pressure control is at goal. 
 
No other joint symptoms. No symptoms of GERD. No symptoms of his coronary artery disease. Except for his back, I think he is doing well. I encouraged him not to sleep on his back, but sleep on the firm mattress he has, which he has not been doing. I think that will go a long way in helping him with his back. He will be back to see us in four to six months, sooner if he needs to.  
 
 
I have discussed the diagnosis with the patient and the intended plan as seen in the 
 orders above. The patient understands and agees with the plan. The patient has  
received an after visit summary and questions were answered concerning 
future plans Patient labs and/or xrays were reviewed Past records were reviewed. PLAN: 
. Orders Placed This Encounter  colesevelam (WELCHOL) 625 mg tablet Follow-up Disposition: 
Return in about 4 months (around 6/4/2019). ATTENTION:  
This medical record was transcribed using an electronic medical records system. Although proofread, it may and can contain electronic and spelling errors. Other human spelling and other errors may be present. Corrections may be executed at a later time. Please feel free to contact us for any clarifications as needed.

## 2019-04-02 ENCOUNTER — OFFICE VISIT (OUTPATIENT)
Dept: CARDIOLOGY CLINIC | Age: 74
End: 2019-04-02

## 2019-04-02 VITALS
HEIGHT: 66 IN | OXYGEN SATURATION: 97 % | SYSTOLIC BLOOD PRESSURE: 103 MMHG | RESPIRATION RATE: 18 BRPM | BODY MASS INDEX: 26.36 KG/M2 | DIASTOLIC BLOOD PRESSURE: 74 MMHG | HEART RATE: 66 BPM | WEIGHT: 164 LBS

## 2019-04-02 DIAGNOSIS — Z98.61 S/P PTCA (PERCUTANEOUS TRANSLUMINAL CORONARY ANGIOPLASTY): ICD-10-CM

## 2019-04-02 DIAGNOSIS — I25.10 ATHEROSCLEROSIS OF NATIVE CORONARY ARTERY OF NATIVE HEART WITHOUT ANGINA PECTORIS: ICD-10-CM

## 2019-04-02 DIAGNOSIS — Z98.61 POSTSURGICAL PERCUTANEOUS TRANSLUMINAL CORONARY ANGIOPLASTY STATUS: ICD-10-CM

## 2019-04-02 DIAGNOSIS — E78.5 DYSLIPIDEMIA: ICD-10-CM

## 2019-04-02 DIAGNOSIS — I10 ESSENTIAL HYPERTENSION, BENIGN: Primary | ICD-10-CM

## 2019-04-02 RX ORDER — FENOFIBRATE 145 MG/1
145 TABLET, COATED ORAL DAILY
Qty: 30 TAB | Refills: 6 | Status: SHIPPED | OUTPATIENT
Start: 2019-04-02 | End: 2020-04-17

## 2019-04-02 NOTE — PROGRESS NOTES
[facility]   [address] [facility], [address] [place] [address] Road                                          NEW PATIENT HPI/FOLLOW-UP      NAME:  Lawanda Fabry.   :   1945   MRN:   [id number]   PCP:  Isabella Leal MD           Subjective: The patient is a 74y.o. year old male  who returns for a routine follow-up. Since the last visit, patient reports no new symptoms. Denies change in exercise tolerance, chest pain, edema, medication intolerance, palpitations, shortness of breath, PND/orthopnea wheezing, sputum, syncope, dizziness or light headedness. Doing satisfactorily. Review of Systems  General: Pt denies excessive weight gain or loss. Pt is able to conduct ADL's. Respiratory: Denies shortness of breath, ÁLVAREZ, wheezing or stridor.   Cardiovascular: Denies precordial pain, palpitations, edema or PND  Gastrointestinal: Denies poor appetite, indigestion, abdominal pain or blood in stool  Peripheral vascular: Denies claudication, leg cramps  Neuropsychiatric: Denies paresthesias,tingling,numbness,anxiety,depression,fatigue  Musculoskeletal: Denies pain,tenderness, soreness,swelling      Past Medical History:   Diagnosis Date    Asthma     hx asthma - no attack in 40+ yrs    Back pain     CAD (coronary artery disease)     Chest pain, unspecified     Chest pain, unspecified     Chest pain, unspecified     Coronary atherosclerosis of native coronary artery 2012    stent    Coronary atherosclerosis of native coronary artery     Essential hypertension     Essential hypertension, benign     Lumbar spinal stenosis 2013    SPINE LUMBAR LAMINECTOMY WITH INSTRUMENTATION - p tamiko    Mixed hyperlipidemia     Other ill-defined conditions(799.89)     HIGH CHOLESTEROL    Other malaise and fatigue     Pure hypercholesterolemia     S/P colonoscopy 2018    md hanh tics     Patient Active Problem List    Diagnosis Date Noted    Intractable pain 08/03/2013     Priority: 1 - One    Right lumbar radiculopathy 08/03/2013     Priority: 1 - One    Encephalopathy, unspecified 08/13/2013     Priority: 2 - Two    S/P PTCA (percutaneous transluminal coronary angioplasty) 08/03/2013     Priority: 2 - Two    Back pain     Chronic left-sided low back pain with sciatica 03/20/2018    S/P colonoscopy 01/17/2018    Drug therapy 06/02/2015    Fatigue 05/29/2015    Dizziness 04/03/2014    Chest pain, unspecified 03/05/2013    Essential hypertension, benign 03/05/2013    Dyslipidemia 03/05/2013    Esophageal reflux 03/05/2013    DJD (degenerative joint disease) 07/17/2012    Postsurgical percutaneous transluminal coronary angioplasty status 07/17/2012    Coronary atherosclerosis of native coronary artery 07/17/2012    Carotid sinus syndrome 07/17/2012      Past Surgical History:   Procedure Laterality Date    COLONOSCOPY N/A 1/17/2018    COL[facility] ENDOSCOPY    [facility]  X2    HX GI      COLONOSCOPY    HX [address] (DR Ana Helton)    HX LUMBAR LAMINECTOMY  8/2013    HX PTCA       Allergies   Allergen Reactions    Statins-Hmg-Coa Reductase Inhibitors Other (comments)     Body aches      Family History   Problem Relation Age of Onset    Cancer Mother         female   [facility] Anesth Problems Neg Hx       Social History     Socioeconomic History    Marital status:      Spouse name: Not on file    Number of children: Not on file    Years of education: Not on file    Highest education level: Not on file   Occupational History    Not on file   Social Needs    Financial resource strain: Not on file    Food insecurity:     Worry: Not on file     Inability: Not on file    Transportation needs:     Medical: Not on file     Non-medical: Not on file   Tobacco Use    Smoking status: Never Smoker    Smokeless tobacco: Never Used   Substance and Sexual Activity    Alcohol use: No    Drug use: No    Sexual activity: Not Currently     Partners: Female   Lifestyle    Physical activity:     Days per week: Not on file     Minutes per session: Not on file    Stress: Not on file   Relationships    Social connections:     Talks on phone: Not on file     Gets together: Not on file     Attends [employer] service: Not on file     Active member of club or organization: Not on file     Attends meetings of clubs or organizations: Not on file     Relationship status: Not on file    Intimate partner violence:     Fear of current or ex partner: Not on file     Emotionally abused: Not on file     Physically abused: Not on file     Forced sexual activity: Not on file   Other Topics Concern    Not on file   Social History Narrative    Medical History: BPHEDelevated PSACholesterolHypertensionMRI 2011 leftw bonnie convexity along the mid to low er lumbar spine    and a rightw bonnie convexity along the thoracolumbar junction. 2 the patient has multilevel degenerative disc disease. 3 there is moderate spinal    stenosis at L3-4 and L4-5 levels. In addition there is facet arthropathy and asymmetrical disc protrusions at L3-4 and L4-5 levels. 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 [id number] quiles md [id number] S1 laminectomy 2013    Hospitalization/Major Diagnostic Procedure: prolonged syncop'e 06    Family History: Mother: , cancerFather: , unknow nSister(s): aliveBrother(s): aliveDaughter(s): aliveGrandmother:    , natural causesGrandfather: , natural causes1 sister(s) - healthy. 3 son(s) , 3 daughter(s) .     Social History: Alcohol Use Patient does not use alcohol. Smoking Status Patient is a never smoker. Caffeine: per day, coffee, soda, tea. Marital Status: . Lives w ith: alone. Children: yes, children. 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 Current Outpatient Medications   Medication Sig    colesevelam (WELCHOL) 625 mg tablet Take 3 Tabs by mouth two (2) times daily (with meals).  fenofibrate nanocrystallized (TRICOR) 48 mg tablet TAKE ONE TABLET BY MOUTH ONCE DAILY    lisinopril-hydroCHLOROthiazide (PRINZIDE, ZESTORETIC) 20-12.5 mg per tablet TAKE ONE TABLET BY MOUTH ONCE DAILY    DOCUSATE CALCIUM (STOOL SOFTENER PO) Take 100 mg by mouth as needed.  MULTIVITAMIN (MULTIPLE VITAMIN PO) Take  by mouth daily.  red yeast rice extract 600 mg cap Take 600 mg by mouth daily.  aspirin delayed-release 81 mg tablet Take 81 mg by mouth daily.  flaxseed 1,000 mg cap Take 1 Cap by mouth daily.  tiZANidine (ZANAFLEX) 4 mg tablet TAKE 1 TABLET THREE TIMES A DAY.  omeprazole (PRILOSEC) 20 mg capsule Take 20 mg by mouth daily. No current facility-administered medications for this visit. I have reviewed the nurses notes, vitals, problem list, allergy list, medical history, family medical, social history and medications. Objective:     Physical Exam:     Vitals:    04/02/19 0925 04/02/19 0930   BP: 95/68 103/74   Pulse: 66    Resp: 18    SpO2: 97%    Weight: 164 lb (74.4 kg)    Height: 5' 6\" (1.676 m)     Body mass index is 26.47 kg/m². General: Well developed, in no acute distress. HEENT: No carotid bruits, no JVD, trach is midline. Heart:  Normal S1/S2 negative S3 or S4. Regular, no murmur, gallop or rub.   Respiratory: Clear bilaterally, no wheezing or rales  Abdomen:   Soft, non-tender, bowel sounds are active.   Extremities:  No edema, normal cap refill, no cyanosis. Neuro: A&Ox3, speech clear, gait stable. Skin: Skin color is normal. No rashes or lesions. No diaphoresis.   Vascular: 2+ pulses symmetric in all extremities        Data Review:       Cardiographics:    EKG: Borderline SB,counterclockwise rotation, LAE    Cardiology Labs:    Results for orders placed or performed during the hospital encounter of 08/03/13   EKG, 12 LEAD, INITIAL   Result Value Ref Range    Ventricular Rate 102 BPM    Atrial Rate 102 BPM    P-R Interval 150 ms    QRS Duration 84 ms    Q-T Interval 336 ms    QTC Calculation (Bezet) 437 ms    Calculated P Axis 64 degrees    Calculated R Axis -7 degrees    Calculated T Axis 53 degrees    Diagnosis       Sinus tachycardia  When compared with ECG of 04-AUG-2013 23:45,  Vent. rate has increased BY  44 BPM  Nonspecific T wave abnormality no longer evident in Inferior leads  Confirmed by Maite Christy M.D., [facility] ([id number]) on 8/9/2013 12:47:22 PM       Lab Results   Component Value Date/Time    Cholesterol, total 209 (H) 09/25/2018 11:11 AM    HDL Cholesterol 45 09/25/2018 11:11 AM    LDL, calculated 142 (H) 09/25/2018 11:11 AM    Triglyceride 112 09/25/2018 11:11 AM    CHOL/HDL Ratio 3.2 08/07/2013 04:24 AM       Lab Results   Component Value Date/Time    Sodium 141 09/25/2018 11:11 AM    Potassium 4.9 09/25/2018 11:11 AM    Chloride 104 09/25/2018 11:11 AM    CO2 24 09/25/2018 11:11 AM    Anion gap 7 01/10/2014 05:17 PM    Glucose 83 09/25/2018 11:11 AM    BUN 16 09/25/2018 11:11 AM    Creatinine 1.37 (H) 09/25/2018 11:11 AM    BUN/Creatinine ratio 12 09/25/2018 11:11 AM    GFR est AA 58 (L) 09/25/2018 11:11 AM    GFR est non-AA 50 (L) 09/25/2018 11:11 AM    Calcium 9.7 09/25/2018 11:11 AM    Bilirubin, total 0.6 09/25/2018 11:11 AM    AST (SGOT) 28 09/25/2018 11:11 AM    Alk. phosphatase 51 09/25/2018 11:11 AM    Protein, total 7.0 09/25/2018 11:11 AM    Albumin 4.5 09/25/2018 11:11 AM    Globulin 3.5 01/10/2014 05:17 PM    A-G Ratio 1.8 09/25/2018 11:11 AM    ALT (SGPT) 30 09/25/2018 11:11 AM          Assessment:       ICD-10-CM ICD-9-CM    1.  Essential hypertension, benign I10 401.1 AMB POC EKG ROUTINE W/ 12 LEADS, INTER & REP      LIPID PANEL      CK      HEPATIC FUNCTION PANEL   2. Dyslipidemia E78.5 272.4 LIPID PANEL      CK      HEPATIC FUNCTION PANEL      fenofibrate nanocrystallized (TRICOR) 145 mg tablet   3. Atherosclerosis of native coronary artery of native heart without angina pectoris I25.10 414.01 LIPID PANEL      CK      HEPATIC FUNCTION PANEL   4. Postsurgical percutaneous transluminal coronary angioplasty status Z98.61 V45.82    5. S/P PTCA (percutaneous transluminal coronary angioplasty) Z98.61 V45.82          Discussion: Patient presents at this time stable from a cardiac perspective. Pleased with present cardiac status. Plan: 1. Continue same meds. Lipid profile and labs not at goal. Did not strart higher dose Tricor 147 mg/day. Repeat labs in 3 months. 2.Encouraged to exercise to tolerance, lose weight and follow low fat, low cholesterol, low sodium predominantly Plant-based (consider Mediterranean) diet. Call with questions or concerns. Will follow up any test results by phone and/or f/u here in office if needed. Nichole Guajardo 3.Follow up: 6 MONTHS    I have discussed the diagnosis with the patient and the intended plan as seen in the above orders. The patient has received an after-visit summary and questions were answered concerning future plans. I have discussed any concerning medication side effects and warnings with the patient as well.     Nichole Marte MD  4/2/2019

## 2019-04-02 NOTE — PROGRESS NOTES
Chief Complaint   Patient presents with    Follow-up     6 mo    Hypertension    Coronary Artery Disease     1. Have you been to the ER, urgent care clinic since your last visit? Hospitalized since your last visit? No    2. Have you seen or consulted any other health care providers outside of the [address] [place] [address] since your last visit? Include any pap smears or colon screening.  No

## 2019-04-03 LAB
ALBUMIN SERPL-MCNC: 4.5 G/DL (ref 3.5–4.8)
ALP SERPL-CCNC: 50 IU/L (ref 39–117)
ALT SERPL-CCNC: 23 IU/L (ref 0–44)
AST SERPL-CCNC: 20 IU/L (ref 0–40)
BILIRUB DIRECT SERPL-MCNC: 0.14 MG/DL (ref 0–0.4)
BILIRUB SERPL-MCNC: 0.5 MG/DL (ref 0–1.2)
CHOLEST SERPL-MCNC: 200 MG/DL (ref 100–199)
CK SERPL-CCNC: 317 U/L (ref 24–204)
HDLC SERPL-MCNC: 48 MG/DL
LDLC SERPL CALC-MCNC: 132 MG/DL (ref 0–99)
PROT SERPL-MCNC: 6.7 G/DL (ref 6–8.5)
TRIGL SERPL-MCNC: 100 MG/DL (ref 0–149)
VLDLC SERPL CALC-MCNC: 20 MG/DL (ref 5–40)

## 2019-04-04 ENCOUNTER — TELEPHONE (OUTPATIENT)
Dept: CARDIOLOGY CLINIC | Age: 74
End: 2019-04-04

## 2019-04-04 NOTE — TELEPHONE ENCOUNTER
I called and spoke with the patient. I reminded him of the labs he recently had done. Dr. Bhanu Rios wanted me to let you know they were better. He is starting you on a new medication Zetia 10 mg once a day. Please call us if you start to experience muscle aches and pain. I verified his pharmacy. He acknowledged understanding and thanked me for the call. Prescription called in to patients pharmacy Zetia 10 mg one PO daily, quantity 30 with 3 refills.

## 2019-04-04 NOTE — TELEPHONE ENCOUNTER
----- Message from Julio Pittman MD sent at 4/4/2019 12:33 AM EDT -----  Regarding: LIPIDS   BETTER    LETS TRY Lubertha Bustle 10 MG every day, # 30, REFILLS 3    CALL IF MUSCLE ACHES AND PAINS    REPEAT LABS--LIPIDS,CPK AND LIVER PANEL LABS IN 3 MONTHS    B  ----- Message -----  From: [facility] Lab Results In  Sent: 4/3/2019   7:40 AM  To: Julio Pittman MD

## 2019-04-23 RX ORDER — LISINOPRIL AND HYDROCHLOROTHIAZIDE 12.5; 2 MG/1; MG/1
TABLET ORAL
Qty: 90 TAB | Refills: 1 | Status: SHIPPED | OUTPATIENT
Start: 2019-04-23 | End: 2019-10-15 | Stop reason: SDUPTHER

## 2019-04-23 RX ORDER — COLESEVELAM HYDROCHLORIDE 625 MG/1
TABLET, FILM COATED ORAL
Qty: 180 TAB | Refills: 6 | Status: SHIPPED | OUTPATIENT
Start: 2019-04-23 | End: 2020-04-17 | Stop reason: ALTCHOICE

## 2019-07-02 DIAGNOSIS — I10 ESSENTIAL HYPERTENSION, BENIGN: ICD-10-CM

## 2019-07-02 DIAGNOSIS — E78.5 DYSLIPIDEMIA: ICD-10-CM

## 2019-07-02 DIAGNOSIS — I25.10 ATHEROSCLEROSIS OF NATIVE CORONARY ARTERY OF NATIVE HEART WITHOUT ANGINA PECTORIS: ICD-10-CM

## 2019-07-19 ENCOUNTER — OFFICE VISIT (OUTPATIENT)
Dept: CARDIOLOGY CLINIC | Age: 74
End: 2019-07-19

## 2019-07-19 VITALS
WEIGHT: 160 LBS | RESPIRATION RATE: 16 BRPM | DIASTOLIC BLOOD PRESSURE: 76 MMHG | SYSTOLIC BLOOD PRESSURE: 106 MMHG | HEART RATE: 61 BPM | HEIGHT: 66 IN | OXYGEN SATURATION: 98 % | BODY MASS INDEX: 25.71 KG/M2

## 2019-07-19 DIAGNOSIS — I25.10 ATHEROSCLEROSIS OF NATIVE CORONARY ARTERY OF NATIVE HEART WITHOUT ANGINA PECTORIS: ICD-10-CM

## 2019-07-19 DIAGNOSIS — I10 ESSENTIAL HYPERTENSION, BENIGN: Primary | ICD-10-CM

## 2019-07-19 DIAGNOSIS — Z98.61 S/P PTCA (PERCUTANEOUS TRANSLUMINAL CORONARY ANGIOPLASTY): ICD-10-CM

## 2019-07-19 DIAGNOSIS — E78.5 DYSLIPIDEMIA: ICD-10-CM

## 2019-07-19 RX ORDER — EZETIMIBE 10 MG/1
TABLET ORAL
Refills: 3 | COMMUNITY
Start: 2019-07-09 | End: 2020-04-17 | Stop reason: SDUPTHER

## 2019-07-19 RX ORDER — CALC/MAG/B COMPLEX/D3/HERB 61
TABLET ORAL
COMMUNITY

## 2019-07-19 NOTE — PROGRESS NOTES
Chief Complaint   Patient presents with    Follow-up    Hypertension     1. Have you been to the ER, urgent care clinic since your last visit? Hospitalized since your last visit? No    2. Have you seen or consulted any other health care providers outside of the [address] [place] [address] since your last visit? Include any pap smears or colon screening.  No

## 2019-07-20 NOTE — PROGRESS NOTES
[facility]   [address] [facility], [address] [place] [address]                                          NEW PATIENT HPI/FOLLOW-UP      NAME:  Tacos Hong.   :   1945   MRN:   [id number]   PCP:  Jose Crawford MD           Subjective: The patient is a 74y.o. year old male  who returns for a routine follow-up. Since the last visit, patient reports no new symptoms. Has not had repeat lipid labs yet. Denies change in exercise tolerance, chest pain, edema, medication intolerance, palpitations, shortness of breath, PND/orthopnea wheezing, sputum, syncope, dizziness or light headedness. Doing satisfactorily. Review of Systems  General: Pt denies excessive weight gain or loss. Pt is able to conduct ADL's. Respiratory: Denies shortness of breath, ÁLVAREZ, wheezing or stridor.   Cardiovascular: Denies precordial pain, palpitations, edema or PND  Gastrointestinal: Denies poor appetite, indigestion, abdominal pain or blood in stool  Peripheral vascular: Denies claudication, leg cramps  Neuropsychiatric: Denies paresthesias,tingling,numbness,anxiety,depression,fatigue  Musculoskeletal: Denies pain,tenderness, soreness,swelling      Past Medical History:   Diagnosis Date    Asthma     hx asthma - no attack in 40+ yrs    Back pain     CAD (coronary artery disease)     Chest pain, unspecified     Chest pain, unspecified     Chest pain, unspecified     Coronary atherosclerosis of native coronary artery 2012    stent    Coronary atherosclerosis of native coronary artery     Essential hypertension     Essential hypertension, benign     Lumbar spinal stenosis 2013    SPINE LUMBAR LAMINECTOMY WITH INSTRUMENTATION - p tamiko    Mixed hyperlipidemia     Other ill-defined conditions(799.89)     HIGH CHOLESTEROL    Other malaise and fatigue     Pure hypercholesterolemia     S/P colonoscopy 2018    md hanh tics     Patient Active Problem List Diagnosis Date Noted    Intractable pain 08/03/2013     Priority: 1 - One    Right lumbar radiculopathy 08/03/2013     Priority: 1 - One    Encephalopathy, unspecified 08/13/2013     Priority: 2 - Two    S/P PTCA (percutaneous transluminal coronary angioplasty) 08/03/2013     Priority: 2 - Two    Back pain     Chronic left-sided low back pain with sciatica 03/20/2018    S/P colonoscopy 01/17/2018    Drug therapy 06/02/2015    Fatigue 05/29/2015    Dizziness 04/03/2014    Chest pain, unspecified 03/05/2013    Essential hypertension, benign 03/05/2013    Dyslipidemia 03/05/2013    Esophageal reflux 03/05/2013    DJD (degenerative joint disease) 07/17/2012    Postsurgical percutaneous transluminal coronary angioplasty status 07/17/2012    Coronary atherosclerosis of native coronary artery 07/17/2012    Carotid sinus syndrome 07/17/2012      Past Surgical History:   Procedure Laterality Date    COLONOSCOPY N/A 1/17/2018    COLONOSCOPY performed [facility] at [facility] ENDOSCOPY    HX [facility]  X2    HX GI      COLONOSCOPY    HX 91 [facility] (DR Nacho Arias)    HX LUMBAR LAMINECTOMY  8/2013    HX PTCA       Allergies   Allergen Reactions    Statins-Hmg-Coa Reductase Inhibitors Other (comments)     Body aches      Family History   Problem Relation Age of Onset    Cancer Mother         female   [place] Anesth Problems Neg Hx       Social History     Socioeconomic History    Marital status:      Spouse name: Not on file    Number of children: Not on file    Years of education: Not on file    Highest education level: Not on file   Occupational History    Not on file   Social Needs    Financial resource strain: Not on file    Food insecurity:     Worry: Not on file     Inability: Not on file    Transportation needs:     Medical: Not on file     Non-medical: Not on file   Tobacco Use    Smoking status: Never Smoker    Smokeless tobacco: Never Used   Substance and Sexual Activity    Alcohol use: No    Drug use: No    Sexual activity: Not Currently     Partners: Female   Lifestyle    Physical activity:     Days per week: Not on file     Minutes per session: Not on file    Stress: Not on file   Relationships    Social connections:     Talks on phone: Not on file     Gets together: Not on file     Attends Faith service: Not on file     Active member of club or organization: Not on file     Attends meetings of clubs or organizations: Not on file     Relationship status: Not on file    Intimate partner violence:     Fear of current or ex partner: Not on file     Emotionally abused: Not on file     Physically abused: Not on file     Forced sexual activity: Not on file   Other Topics Concern    Not on file   Social History Narrative    Medical History: BPHEDelevated PSACholesterolHypertensionMRI 2011 leftw bonnie convexity along the mid to low er lumbar spine    and a rightw bonnie convexity along the thoracolumbar junction. 2 the patient has multilevel degenerative disc disease. 3 there is moderate spinal    stenosis at L3-4 and L4-5 levels. In addition there is facet arthropathy and asymmetrical disc protrusions at L3-4 and L4-5 levels. There is a    narrow ing of the lateral recesses w ith the potential for extrinsic compression upon the origins of the L4 and the L5 nerve roots respectivelyCT    abdomen 2003 malrotation left kidney otherw ise normalCarotid sinus syndrome    Surgical History: colonoscopy by Cathie Golden M.D. [id number] - md kb [id number] S1 laminectomy 2013    Hospitalization/Major Diagnostic Procedure: prolonged syncop'e 06    Family History: Mother: , cancerFather: , unknow nSister(s): aliveBrother(s): aliveDaughter(s): aliveGrandmother:    , natural causesGrandfather: , natural causes1 sister(s) - healthy. 3 son(s) , 3 daughter(s) . Social History: Alcohol Use Patient does not use alcohol. Smoking Status Patient is a never smoker. Caffeine: per day, coffee, soda, tea. Marital Status: . Lives w ith: alone. Children: yes, children. Occupation/W ork: retired - [email or web address]. Current Outpatient Medications   Medication Sig    ezetimibe (ZETIA) 10 mg tablet TAKE 1 TABLET BY MOUTH ONCE DAILY    lansoprazole (PREVACID) 15 mg capsule Take  by mouth Daily (before breakfast).  lisinopril-hydroCHLOROthiazide (PRINZIDE, ZESTORETIC) 20-12.5 mg per tablet TAKE 1 TABLET BY MOUTH ONCE DAILY    colesevelam (WELCHOL) 625 mg tablet Take 3 Tabs by mouth two (2) times daily (with meals).  DOCUSATE CALCIUM (STOOL SOFTENER PO) Take 100 mg by mouth as needed.  red yeast rice extract 600 mg cap Take 600 mg by mouth daily.  aspirin delayed-release 81 mg tablet Take 81 mg by mouth daily.  flaxseed 1,000 mg cap Take 1 Cap by mouth daily.  WELCHOL 625 mg tablet TAKE 3 TABLETS BY MOUTH TWICE DAILY WITH MEALS    fenofibrate nanocrystallized (TRICOR) 145 mg tablet Take 1 Tab by mouth daily.  tiZANidine (ZANAFLEX) 4 mg tablet TAKE 1 TABLET THREE TIMES A DAY.  omeprazole (PRILOSEC) 20 mg capsule Take 20 mg by mouth daily.  MULTIVITAMIN (MULTIPLE VITAMIN PO) Take  by mouth daily. No current facility-administered medications for this visit. I have reviewed the nurses notes, vitals, problem list, allergy list, medical history, family medical, social history and medications. Objective:     Physical Exam:     Vitals:    07/19/19 1600   BP: 106/76   Pulse: 61   Resp: 16   SpO2: 98%   Weight: 160 lb (72.6 kg)   Height: 5' 6\" (1.676 m)    Body mass index is 25.82 kg/m². General: Well developed, in no acute distress. HEENT: No carotid bruits, no JVD, trach is midline. Heart:  Normal S1/S2 negative S3 or S4.  Regular, no murmur, gallop or rub.   Respiratory: Clear bilaterally, no wheezing or rales  Abdomen:   Soft, non-tender, bowel sounds are active.   Extremities:  No edema, normal cap refill, no cyanosis. Neuro: A&Ox3, speech clear, gait stable. Skin: Skin color is normal. No rashes or lesions. No diaphoresis. Vascular: 2+ pulses symmetric in all extremities        Data Review:       Cardiographics:    EKG: SB,otherwise normal    Cardiology Labs:    Results for orders placed or performed during the hospital encounter of 08/03/13   EKG, 12 LEAD, INITIAL   Result Value Ref Range    Ventricular Rate 102 BPM    Atrial Rate 102 BPM    P-R Interval 150 ms    QRS Duration 84 ms    Q-T Interval 336 ms    QTC Calculation (Bezet) 437 ms    Calculated P Axis 64 degrees    Calculated R Axis -7 degrees    Calculated T Axis 53 degrees    Diagnosis       Sinus tachycardia  When compared with ECG of 04-AUG-2013 23:45,  Vent. rate has increased BY  44 BPM  Nonspecific T wave abnormality no longer evident in Inferior leads  Confirmed by Nadya Pearson M.D., [facility] ([id number]) on 8/9/2013 12:47:22 PM       Lab Results   Component Value Date/Time    Cholesterol, total 200 (H) 04/02/2019 10:40 AM    HDL Cholesterol 48 04/02/2019 10:40 AM    LDL, calculated 132 (H) 04/02/2019 10:40 AM    Triglyceride 100 04/02/2019 10:40 AM    CHOL/HDL Ratio 3.2 08/07/2013 04:24 AM       Lab Results   Component Value Date/Time    Sodium 141 09/25/2018 11:11 AM    Potassium 4.9 09/25/2018 11:11 AM    Chloride 104 09/25/2018 11:11 AM    CO2 24 09/25/2018 11:11 AM    Anion gap 7 01/10/2014 05:17 PM    Glucose 83 09/25/2018 11:11 AM    BUN 16 09/25/2018 11:11 AM    Creatinine 1.37 (H) 09/25/2018 11:11 AM    BUN/Creatinine ratio 12 09/25/2018 11:11 AM    GFR est AA 58 (L) 09/25/2018 11:11 AM    GFR est non-AA 50 (L) 09/25/2018 11:11 AM    Calcium 9.7 09/25/2018 11:11 AM    Bilirubin, total 0.5 04/02/2019 10:40 AM    AST (SGOT) 20 04/02/2019 10:40 AM    Alk.  phosphatase 50 04/02/2019 10:40 AM    Protein, total 6.7 04/02/2019 10:40 AM    Albumin 4.5 04/02/2019 10:40 AM    Globulin 3.5 01/10/2014 05:17 PM    A-G Ratio 1.8 09/25/2018 11:11 AM    ALT (SGPT) 23 04/02/2019 10:40 AM          Assessment:       ICD-10-CM ICD-9-CM    1. Essential hypertension, benign I10 401.1 AMB POC EKG ROUTINE W/ 12 LEADS, INTER & REP      CK      LIPID PANEL      HEPATIC FUNCTION PANEL   2. Dyslipidemia E78.5 272.4 CK      LIPID PANEL      HEPATIC FUNCTION PANEL   3. S/P PTCA (percutaneous transluminal coronary angioplasty) Z98.61 V45.82 CK      LIPID PANEL      HEPATIC FUNCTION PANEL   4. Atherosclerosis of native coronary artery of native heart without angina pectoris I25.10 414.01 CK      LIPID PANEL      HEPATIC FUNCTION PANEL         Discussion: Patient presents at this time stable from a cardiac perspective. Pleased with present cardiac status. Plan: 1. Continue same meds. Lipid profile and labs not done yet. Will order. Lipids not at goal. Statins not tolerated. Present meds not therapeutic at maximum doses. Offered PCSK9 Rx but patient refuses. Await labs. 2.Encouraged to exercise to tolerance and follow low fat, low cholesterol, low sodium predominantly Plant-based (consider Mediterranean) diet. Call with questions or concerns. Will follow up any test results by phone and/or f/u here in office if needed. Pippa Lu 3.Follow up: 6 months    I have discussed the diagnosis with the patient and the intended plan as seen in the above orders. The patient has received an after-visit summary and questions were answered concerning future plans. I have discussed any concerning medication side effects and warnings with the patient as well.     Yumiko Donato MD  7/20/2019

## 2019-07-24 LAB
ALBUMIN SERPL-MCNC: 4.6 G/DL (ref 3.5–4.8)
ALP SERPL-CCNC: 37 IU/L (ref 39–117)
ALT SERPL-CCNC: 25 IU/L (ref 0–44)
AST SERPL-CCNC: 26 IU/L (ref 0–40)
BILIRUB DIRECT SERPL-MCNC: 0.12 MG/DL (ref 0–0.4)
BILIRUB SERPL-MCNC: 0.4 MG/DL (ref 0–1.2)
CHOLEST SERPL-MCNC: 180 MG/DL (ref 100–199)
CK SERPL-CCNC: 498 U/L (ref 24–204)
HDLC SERPL-MCNC: 47 MG/DL
LDLC SERPL CALC-MCNC: 115 MG/DL (ref 0–99)
PROT SERPL-MCNC: 6.6 G/DL (ref 6–8.5)
TRIGL SERPL-MCNC: 88 MG/DL (ref 0–149)
VLDLC SERPL CALC-MCNC: 18 MG/DL (ref 5–40)

## 2019-07-25 ENCOUNTER — TELEPHONE (OUTPATIENT)
Dept: CARDIOLOGY CLINIC | Age: 74
End: 2019-07-25

## 2019-07-25 DIAGNOSIS — E78.5 DYSLIPIDEMIA: Primary | ICD-10-CM

## 2019-07-25 NOTE — TELEPHONE ENCOUNTER
----- Message from Óscar Leos MD sent at 7/25/2019 12:42 AM EDT -----  Regarding: CPK  CPK UP     LETS STOP TRICOR AND CONTINUE WELCHOL AND ZETIA    REPEAT CPK 1 MONTH    B  ----- Message -----  From: Mirtha Ballesteros Lab Results In  Sent: 7/24/2019   5:42 AM EDT  To: Óscar Leos MD

## 2019-07-25 NOTE — TELEPHONE ENCOUNTER
I spoke with the patient. I reminded him of the lab work he recently had done. I informed him his CPK was elevated and Dr. Jumana Salgado wants you to stop the Tricor. Ryan Lee He went and obtained his medications. I told him it is called fenofibrate. He stated, \"So he wants me to stop this one? \" I told him yes and he wants to repeat the lab in one month. I will mail you the lab slip. He acknowledged understanding and thanked me for the call.

## 2019-08-19 DIAGNOSIS — I25.10 ATHEROSCLEROSIS OF NATIVE CORONARY ARTERY OF NATIVE HEART WITHOUT ANGINA PECTORIS: ICD-10-CM

## 2019-08-19 DIAGNOSIS — Z98.61 S/P PTCA (PERCUTANEOUS TRANSLUMINAL CORONARY ANGIOPLASTY): ICD-10-CM

## 2019-08-19 DIAGNOSIS — I10 ESSENTIAL HYPERTENSION, BENIGN: ICD-10-CM

## 2019-08-19 DIAGNOSIS — E78.5 DYSLIPIDEMIA: ICD-10-CM

## 2019-08-24 DIAGNOSIS — E78.5 DYSLIPIDEMIA: ICD-10-CM

## 2019-08-29 ENCOUNTER — TELEPHONE (OUTPATIENT)
Dept: CARDIOLOGY CLINIC | Age: 74
End: 2019-08-29

## 2019-09-05 LAB
ALBUMIN SERPL-MCNC: 4.4 G/DL (ref 3.5–4.8)
ALP SERPL-CCNC: 48 IU/L (ref 39–117)
ALT SERPL-CCNC: 16 IU/L (ref 0–44)
AST SERPL-CCNC: 23 IU/L (ref 0–40)
BILIRUB DIRECT SERPL-MCNC: 0.17 MG/DL (ref 0–0.4)
BILIRUB SERPL-MCNC: 0.6 MG/DL (ref 0–1.2)
CHOLEST SERPL-MCNC: 183 MG/DL (ref 100–199)
CK SERPL-CCNC: 697 U/L (ref 24–204)
HDLC SERPL-MCNC: 50 MG/DL
LDLC SERPL CALC-MCNC: 116 MG/DL (ref 0–99)
PROT SERPL-MCNC: 6.8 G/DL (ref 6–8.5)
TRIGL SERPL-MCNC: 83 MG/DL (ref 0–149)
VLDLC SERPL CALC-MCNC: 17 MG/DL (ref 5–40)

## 2019-09-06 ENCOUNTER — TELEPHONE (OUTPATIENT)
Dept: CARDIOLOGY CLINIC | Age: 74
End: 2019-09-06

## 2019-09-06 NOTE — TELEPHONE ENCOUNTER
----- Message from Alicia Freeman MD sent at 9/6/2019 10:40 AM EDT -----  Regarding: Akosua Roman,     Have him stop fenofibrate,continue zetia and Welchol and repeat CPK in 2 weeks    B  ----- Message -----  From: Favian, [facility] Lab Results In  Sent: 9/5/2019   5:43 AM EDT  To: Alicia Freeman MD

## 2019-09-06 NOTE — TELEPHONE ENCOUNTER
I spoke with the patient. I informed him Dr. Romelia Ramirez wants you to stop your Fenofibrate. Please continue the Zetia and Welchol. He will repeat lab in 2 weeks. The patient acknowledged understanding and thanked me for the call.

## 2019-09-10 NOTE — TELEPHONE ENCOUNTER
I spoke with the patient. He stopped taking Tricor in July as directed by Dr. Bairon Navarro. I told him I will route this to Dr. Bairon Navarro and upon his reply call you back.

## 2019-09-24 LAB — CK SERPL-CCNC: 282 U/L (ref 24–204)

## 2019-09-25 ENCOUNTER — TELEPHONE (OUTPATIENT)
Dept: CARDIOLOGY CLINIC | Age: 74
End: 2019-09-25

## 2019-09-25 NOTE — TELEPHONE ENCOUNTER
I called the patient and informed him Dr. Simba Ortiz wanted me to let you know the lab result was better and to continue the same. He acknowledged understanding and thanked me for the call.

## 2019-09-25 NOTE — TELEPHONE ENCOUNTER
----- Message from Cordell Rosario MD sent at 9/24/2019 11:34 PM EDT -----  Regarding: CPK  BETTER    CONTINUE SAME    B  ----- Message -----  From: Mirtha Ballesteros Lab Results In  Sent: 9/24/2019   8:36 AM EDT  To: Cordell Rosario MD

## 2019-10-16 RX ORDER — LISINOPRIL AND HYDROCHLOROTHIAZIDE 12.5; 2 MG/1; MG/1
TABLET ORAL
Qty: 90 TAB | Refills: 1 | Status: SHIPPED | OUTPATIENT
Start: 2019-10-16 | End: 2020-04-17 | Stop reason: SDUPTHER

## 2020-04-17 ENCOUNTER — OFFICE VISIT (OUTPATIENT)
Dept: CARDIOLOGY CLINIC | Age: 75
End: 2020-04-17

## 2020-04-17 VITALS — BODY MASS INDEX: 25.71 KG/M2 | WEIGHT: 160 LBS | HEIGHT: 66 IN

## 2020-04-17 DIAGNOSIS — Z98.61 POSTSURGICAL PERCUTANEOUS TRANSLUMINAL CORONARY ANGIOPLASTY STATUS: Primary | ICD-10-CM

## 2020-04-17 DIAGNOSIS — I25.10 ATHEROSCLEROSIS OF NATIVE CORONARY ARTERY OF NATIVE HEART WITHOUT ANGINA PECTORIS: ICD-10-CM

## 2020-04-17 DIAGNOSIS — I10 ESSENTIAL HYPERTENSION, BENIGN: ICD-10-CM

## 2020-04-17 DIAGNOSIS — Z98.61 S/P PTCA (PERCUTANEOUS TRANSLUMINAL CORONARY ANGIOPLASTY): ICD-10-CM

## 2020-04-17 DIAGNOSIS — E78.5 DYSLIPIDEMIA: ICD-10-CM

## 2020-04-17 RX ORDER — LISINOPRIL AND HYDROCHLOROTHIAZIDE 12.5; 2 MG/1; MG/1
1 TABLET ORAL DAILY
Qty: 90 TAB | Refills: 3 | Status: SHIPPED | OUTPATIENT
Start: 2020-04-17 | End: 2020-10-29 | Stop reason: SDUPTHER

## 2020-04-17 RX ORDER — CHOLECALCIFEROL (VITAMIN D3) 125 MCG
2000 CAPSULE ORAL
COMMUNITY

## 2020-04-17 RX ORDER — ASCORBIC ACID 500 MG
TABLET ORAL DAILY
COMMUNITY

## 2020-04-17 RX ORDER — EZETIMIBE 10 MG/1
TABLET ORAL
Qty: 90 TAB | Refills: 3 | Status: SHIPPED | OUTPATIENT
Start: 2020-04-17 | End: 2020-10-29 | Stop reason: SDUPTHER

## 2020-04-17 NOTE — PROGRESS NOTES
Chief Complaint   Patient presents with    Cholesterol Problem     6 month follow up, No cardiac complaints. Patient will get BP reading and give to MD during call     1. Have you been to the ER, urgent care clinic since your last visit? Hospitalized since your last visit? No    2. Have you seen or consulted any other health care providers outside of the [address] [place] [address] since your last visit? Include any pap smears or colon screening.  No

## 2020-04-17 NOTE — PROGRESS NOTES
[facility]                                                                                  Gail Shukla [facility], [facility]    Cardiology Telephone Encounter                                                         Pursuant to the emergency declaration under the [facility] waiver authority and the [employer] Act, this phone visit was conducted, with patient's consent, to reduce the patient's risk of exposure to COVID-19 and provide continuity of care for an established patient. Subjective/HPI:   Juan Ramon Palmer is a 66 y.o. male who was evaluated via telephone encounter. Patient is a 68-year-old male previous patient of Dr. Marbin Alex with a history of PTCA stenting in 2012/13. Patient reports feeling well in his usual state of health, able to walk twice a week without limiting chest pain or dyspnea on exertion, has some limitations due to chronic back pain. He is also performing yard work without any limitations. Self-reported blood pressure today 139/83, weight 160 pounds. Nuclear stress test 2015:    Findings: The overall quality of the study is excellent. Attenuation  artifact was noted, that is more prominent at rest than stress  study. Left ventricular cavity is noted to be normal on the rest  and stress studies. No TID noted. SPECT images demonstrate homogeneous tracer distrubution  throughout the myocardium on the stress and rest images. Gated  SPECT images reveals normal myocardial thickening and wall  motion. The left ventricular ejection fraction was calculated to  be >75 %.     Impression:   Myocardial perfusion imaging is normal. Overall left ventricular  systolic function was normal.      These test results indicate low likelihood for the presence of  angiographically significant coronary artery disease.         PCP Provider  Sedrick Flores MD  Past Medical History:   Diagnosis Date    Asthma     hx asthma - no attack in 40+ yrs    Back pain     CAD (coronary artery disease)     Chest pain, unspecified     Chest pain, unspecified     Chest pain, unspecified     Coronary atherosclerosis of native coronary artery 07/17/2012    stent    Coronary atherosclerosis of native coronary artery     Essential hypertension     Essential hypertension, benign     Lumbar spinal stenosis 08/03/2013    SPINE LUMBAR LAMINECTOMY WITH INSTRUMENTATION - p tamiko    Mixed hyperlipidemia     Other ill-defined conditions(799.89)     HIGH CHOLESTEROL    Other malaise and fatigue     Pure hypercholesterolemia     S/P colonoscopy 01/17/2018    [facility]      Past Surgical History:   Procedure Laterality Date    COLONOSCOPY N/A 1/17/2018    COL[facility]OSCOPY performed by [facility]ito [facility] at [facility] ENDOSCOPY    HX [address]  X2    HX GI      COLONOSCOPY    HX HEART CATHETERIZATION      STENT (DR LARSON)    HX LUMBAR LAMINECTOMY  8/2013    HX PTCA       Allergies   Allergen Reactions    Statins-Hmg-Coa Reductase Inhibitors Other (comments)     Body aches      Family History   Problem Relation Age of Onset    Cancer Mother         female   [facility] Anesth Problems Neg Hx       Current Outpatient Medications   Medication Sig    lisinopril-hydroCHLOROthiazide (PRINZIDE, ZESTORETIC) 20-12.5 mg per tablet TAKE 1 TABLET BY MOUTH ONCE DAILY    ezetimibe (ZETIA) 10 mg tablet TAKE 1 TABLET BY MOUTH ONCE DAILY    lansoprazole (PREVACID) 15 mg capsule Take  by mouth Daily (before breakfast).  WELCHOL 625 mg tablet TAKE 3 TABLETS BY MOUTH TWICE DAILY WITH MEALS    fenofibrate nanocrystallized (TRICOR) 145 mg tablet Take 1 Tab by mouth daily.  colesevelam (WELCHOL) 625 mg tablet Take 3 Tabs by mouth two (2) times daily (with meals).  tiZANidine (ZANAFLEX) 4 mg tablet TAKE 1 TABLET THREE TIMES A DAY.     omeprazole (PRILOSEC) 20 mg capsule Take 20 mg by mouth daily.  DOCUSATE CALCIUM (STOOL SOFTENER PO) Take 100 mg by mouth as needed.  MULTIVITAMIN (MULTIPLE VITAMIN PO) Take  by mouth daily.  red yeast rice extract 600 mg cap Take 600 mg by mouth daily.  aspirin delayed-release 81 mg tablet Take 81 mg by mouth daily.  flaxseed 1,000 mg cap Take 1 Cap by mouth daily. No current facility-administered medications for this visit. There were no vitals filed for this visit.   Social History     Socioeconomic History    Marital status:      Spouse name: Not on file    Number of children: Not on file    Years of education: Not on file    Highest education level: Not on file   Occupational History    Not on file   Social Needs    Financial resource strain: Not on file    Food insecurity     Worry: Not on file     Inability: Not on file    Transportation needs     Medical: Not on file     Non-medical: Not on file   Tobacco Use    Smoking status: Never Smoker    Smokeless tobacco: Never Used   Substance and Sexual Activity    Alcohol use: No    Drug use: No    Sexual activity: Not Currently     Partners: Female   Lifestyle    Physical activity     Days per week: Not on file     Minutes per session: Not on file    Stress: Not on file   Relationships    Social connections     Talks on phone: Not on file     Gets together: Not on file     Attends Episcopal service: Not on file     Active member of club or organization: Not on file     Attends meetings of clubs or organizations: Not on file     Relationship status: Not on file    Intimate partner violence     Fear of current or ex partner: Not on file     Emotionally abused: Not on file     Physically abused: Not on file     Forced sexual activity: Not on file   Other Topics Concern    Not on file   Social History Narrative    Medical History: BPHEDelevated PSACholesterolHypertensionMRI September 2, 2011 leftw bonnie convexity along the mid to low er lumbar spine    and a rightw bonnie convexity along the thoracolumbar junction. 2 the patient has multilevel degenerative disc disease. 3 there is moderate spinal    stenosis at L3-4 and L4-5 levels. In addition there is facet arthropathy and asymmetrical disc protrusions at L3-4 and L4-5 levels. 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 [id number] quiles md [id number] S1 laminectomy 2013    Hospitalization/Major Diagnostic Procedure: prolonged syncop'e 06    Family History: Mother: , cancerFather: , unknow nSister(s): aliveBrother(s): aliveDaughter(s): aliveGrandmother:    , natural causesGrandfather: , natural causes1 sister(s) - healthy. 3 son(s) , 3 daughter(s) . Social History: Alcohol Use Patient does not use alcohol. Smoking Status Patient is a never smoker. Caffeine: per day, coffee, soda, tea. Marital Status: . Lives w ith: alone. Children: yes, children. 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        Review of Symptoms  11 systems reviewed, negative other than as stated in the HPI    Physical Exam:    Due to this being a telephone encounter a very limited exam was performed  Neurological: A&Ox3, no slurred speech, answering questions appropriately  Respiratory: Non labored, talking in complete sentences, no audible wheeze over the phone       Cardiology Labs:  Lab Results   Component Value Date/Time    Cholesterol, total 183 2019 08:39 AM    HDL Cholesterol 50 2019 08:39 AM    LDL, calculated 116 (H) 2019 08:39 AM    Triglyceride 83 2019 08:39 AM    CHOL/HDL Ratio 3.2 2013 04:24 AM       Lab Results   Component Value Date/Time    Sodium 141 2018 11:11 AM    Potassium 4.9 09/25/2018 11:11 AM    Chloride 104 09/25/2018 11:11 AM    CO2 24 09/25/2018 11:11 AM    Anion gap 7 01/10/2014 05:17 PM    Glucose 83 09/25/2018 11:11 AM    BUN 16 09/25/2018 11:11 AM    Creatinine 1.37 (H) 09/25/2018 11:11 AM    BUN/Creatinine ratio 12 09/25/2018 11:11 AM    GFR est AA 58 (L) 09/25/2018 11:11 AM    GFR est non-AA 50 (L) 09/25/2018 11:11 AM    Calcium 9.7 09/25/2018 11:11 AM    Bilirubin, total 0.6 09/04/2019 08:39 AM    AST (SGOT) 23 09/04/2019 08:39 AM    Alk. phosphatase 48 09/04/2019 08:39 AM    Protein, total 6.8 09/04/2019 08:39 AM    Albumin 4.4 09/04/2019 08:39 AM    Globulin 3.5 01/10/2014 05:17 PM    A-G Ratio 1.8 09/25/2018 11:11 AM    ALT (SGPT) 16 09/04/2019 08:39 AM         Assessment:     Diagnoses and all orders for this visit:    1. Atherosclerosis of native coronary artery of native heart without angina pectoris    2. Essential hypertension, benign    3. Postsurgical percutaneous transluminal coronary angioplasty status    4. Dyslipidemia    5. S/P PTCA (percutaneous transluminal coronary angioplasty)        ICD-10-CM ICD-9-CM    1. Atherosclerosis of native coronary artery of native heart without angina pectoris I25.10 414.01    2. Essential hypertension, benign I10 401.1    3. Postsurgical percutaneous transluminal coronary angioplasty status Z98.61 V45.82    4. Dyslipidemia E78.5 272.4    5. S/P PTCA (percutaneous transluminal coronary angioplasty) Z98.61 V45.82      No orders of the defined types were placed in this encounter. Plan:     1. Atherosclerotic heart disease: Remote history of PTCA stenting, asymptomatic of atherosclerotic heart disease continue current therapy  2. Hypertension: Stable 139/83 with home blood pressure monitor, continue lisinopril hydrochlorothiazide. Will send for lab work to be done over the summer to include renal function. 3.  Hyperlipidemia: Statin intolerance, on WelChol, he was not taking Zetia.   Will resume Zetia along with 3 tabs of WelChol twice a day. Recheck lipid panel in 3 to 4 months, if not at target may consider PSK 9 therapy  Follow-up on site in 6 months. TIME (Minutes) SPENT RELATED TO THIS PHONE ENCOUNTER:20    We discussed the expected course, resolution and complications of the diagnosis(es) in detail. Medication risks, benefits, costs, interactions, and alternatives were discussed as indicated. I advised him to contact the office if his condition worsens, changes or fails to improve as anticipated.  He expressed understanding with the diagnosis(es) and plan  Beverly Almanzar NP

## 2020-05-01 RX ORDER — COLESEVELAM HYDROCHLORIDE 625 MG/1
TABLET, FILM COATED ORAL
Qty: 180 TAB | Refills: 0 | Status: SHIPPED | OUTPATIENT
Start: 2020-05-01 | End: 2020-06-23

## 2020-07-17 DIAGNOSIS — I25.10 ATHEROSCLEROSIS OF NATIVE CORONARY ARTERY OF NATIVE HEART WITHOUT ANGINA PECTORIS: ICD-10-CM

## 2020-07-17 DIAGNOSIS — Z98.61 POSTSURGICAL PERCUTANEOUS TRANSLUMINAL CORONARY ANGIOPLASTY STATUS: ICD-10-CM

## 2020-07-17 DIAGNOSIS — E78.5 DYSLIPIDEMIA: ICD-10-CM

## 2020-07-17 DIAGNOSIS — I10 ESSENTIAL HYPERTENSION, BENIGN: ICD-10-CM

## 2020-10-06 LAB
ALBUMIN SERPL-MCNC: 4.5 G/DL (ref 3.7–4.7)
ALBUMIN/GLOB SERPL: 1.9 {RATIO} (ref 1.2–2.2)
ALP SERPL-CCNC: 60 IU/L (ref 39–117)
ALT SERPL-CCNC: 20 IU/L (ref 0–44)
AST SERPL-CCNC: 24 IU/L (ref 0–40)
BILIRUB SERPL-MCNC: 0.7 MG/DL (ref 0–1.2)
BUN SERPL-MCNC: 15 MG/DL (ref 8–27)
BUN/CREAT SERPL: 12 (ref 10–24)
CALCIUM SERPL-MCNC: 9.5 MG/DL (ref 8.6–10.2)
CHLORIDE SERPL-SCNC: 105 MMOL/L (ref 96–106)
CHOLEST SERPL-MCNC: 174 MG/DL (ref 100–199)
CK SERPL-CCNC: 547 U/L (ref 41–331)
CO2 SERPL-SCNC: 26 MMOL/L (ref 20–29)
CREAT SERPL-MCNC: 1.28 MG/DL (ref 0.76–1.27)
GLOBULIN SER CALC-MCNC: 2.4 G/DL (ref 1.5–4.5)
GLUCOSE SERPL-MCNC: 94 MG/DL (ref 65–99)
HDLC SERPL-MCNC: 52 MG/DL
LDLC SERPL CALC-MCNC: 106 MG/DL (ref 0–99)
POTASSIUM SERPL-SCNC: 4.2 MMOL/L (ref 3.5–5.2)
PROT SERPL-MCNC: 6.9 G/DL (ref 6–8.5)
SODIUM SERPL-SCNC: 142 MMOL/L (ref 134–144)
TRIGL SERPL-MCNC: 88 MG/DL (ref 0–149)
VLDLC SERPL CALC-MCNC: 16 MG/DL (ref 5–40)

## 2020-10-06 NOTE — PROGRESS NOTES
Placed call to pt. Two pt identifiers confirmed. Message from NP Karyle Peyer delivered to patient. Message from NP [employer], \"cholesterol mildly elevated we know it is been challenging getting a controlled with medications are not statin. Will discuss with his upcoming appointment alternative therapy. We will also discussed with him his CK elevation despite not being on any statin therapy. Please make sure he keeps his follow-up appointment. \" Pt was also reminded of the date and time of f/u appointment with NP Karyle Peyer. Pt verbalized understanding of information discussed w/ no further questions at this time.

## 2020-10-16 NOTE — PROGRESS NOTES
[address] [facility] @ [address], [place]  Subjective/HPI:     Lexi Clayton. is a 66 y.o. male is here for routine f/u. The patient denies chest pain/ shortness of breath, orthopnea, PND, LE edema, palpitations, syncope, presyncope or fatigue. Patient reports feeling well in his usual state of health. Intermittently having back pain from lumbar stenosis. Denies any exertional chest pain or dyspnea on exertion. Virtual phone visit 4/2020  1. Atherosclerotic heart disease: Remote history of PTCA stenting, asymptomatic of atherosclerotic heart disease continue current therapy  2. Hypertension: Stable 139/83 with home blood pressure monitor, continue lisinopril hydrochlorothiazide. Will send for lab work to be done over the summer to include renal function. 3.  Hyperlipidemia: Statin intolerance, on WelChol, he was not taking Zetia. Will resume Zetia along with 3 tabs of WelChol twice a day.   Recheck lipid panel in 3 to 4 months, if not at target may consider PSK 9 therapy    PCP Provider  Sylvia Engel MD  Past Medical History:   Diagnosis Date    Asthma     hx asthma - no attack in 40+ yrs    Back pain     CAD (coronary artery disease)     Chest pain, unspecified     Chest pain, unspecified     Chest pain, unspecified     Coronary atherosclerosis of native coronary artery 07/17/2012    stent    Coronary atherosclerosis of native coronary artery     Essential hypertension     Essential hypertension, benign     Lumbar spinal stenosis 08/03/2013    SPINE LUMBAR LAMINECTOMY WITH INSTRUMENTATION - p tamiko    Mixed hyperlipidemia     Other ill-defined conditions(799.89)     HIGH CHOLESTEROL    Other malaise and fatigue     Pure hypercholesterolemia     S/P colonoscopy 01/17/2018    [facility]      Past Surgical History:   Procedure Laterality Date    COLONOSCOPY N/A 1/17/2018    COLONOSCOPY performed by [facility] Galvin [facility] at [facility] ENDOSCOPY    HX BACK SURGERY      THORACIC SPINE SURGERY  X2    HX GI      COLONOSCOPY    HX HEART CATHETERIZATION      STENT (DR LARSON)    HX LUMBAR LAMINECTOMY  8/2013    HX PTCA       Allergies   Allergen Reactions    Statins-Hmg-Coa Reductase Inhibitors Other (comments)     Body aches      Family History   Problem Relation Age of Onset    Cancer Mother         female   [employer] Anesth Problems Neg Hx       Current Outpatient Medications   Medication Sig    WelChoL 625 mg tablet TAKE 3 TABLETS BY MOUTH TWICE DAILY WITH MEALS    cholecalciferol, vitamin D3, (Vitamin D3) 50 mcg (2,000 unit) tab Take 2,000 Units by mouth.  ascorbic acid, vitamin C, (Vitamin C) 500 mg tablet Take  by mouth.  ezetimibe (ZETIA) 10 mg tablet TAKE 1 TABLET BY MOUTH ONCE DAILY    lisinopril-hydroCHLOROthiazide (PRINZIDE, ZESTORETIC) 20-12.5 mg per tablet Take 1 Tab by mouth daily.  lansoprazole (PREVACID) 15 mg capsule Take  by mouth Daily (before breakfast).  DOCUSATE CALCIUM (STOOL SOFTENER PO) Take 100 mg by mouth as needed.  red yeast rice extract 600 mg cap Take 600 mg by mouth daily.  aspirin delayed-release 81 mg tablet Take 81 mg by mouth daily.  flaxseed 1,000 mg cap Take 1 Cap by mouth daily. No current facility-administered medications for this visit. There were no vitals filed for this visit.   Social History     Socioeconomic History    Marital status:      Spouse name: Not on file    Number of children: Not on file    Years of education: Not on file    Highest education level: Not on file   Occupational History    Not on file   Social Needs    Financial resource strain: Not on file    Food insecurity     Worry: Not on file     Inability: Not on file    Transportation needs     Medical: Not on file     Non-medical: Not on file   Tobacco Use    Smoking status: Never Smoker    Smokeless tobacco: Never Used   Substance and Sexual Activity    Alcohol use: No    Drug use: No    Sexual activity: Not Currently     Partners: Female   Lifestyle    Physical activity     Days per week: Not on file     Minutes per session: Not on file    Stress: Not on file   Relationships    Social connections     Talks on phone: Not on file     Gets together: Not on file     Attends Restorationist service: Not on file     Active member of club or organization: Not on file     Attends meetings of clubs or organizations: Not on file     Relationship status: Not on file    Intimate partner violence     Fear of current or ex partner: Not on file     Emotionally abused: Not on file     Physically abused: Not on file     Forced sexual activity: Not on file   Other Topics Concern    Not on file   Social History Narrative    Medical History: BPHEDelevated PSACholesterolHypertensionMRI 2011 leftw bonnie convexity along the mid to low er lumbar spine    and a rightw bonnie convexity along the thoracolumbar junction. 2 the patient has multilevel degenerative disc disease. 3 there is moderate spinal    stenosis at L3-4 and L4-5 levels. In addition there is facet arthropathy and asymmetrical disc protrusions at L3-4 and L4-5 levels. 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 [id number] - md kb [id number] S1 laminectomy 2013    Hospitalization/Major Diagnostic Procedure: prolonged syncop'e 06    Family History: Mother: , cancerFather: , unknow nSister(s): aliveBrother(s): aliveDaughter(s): aliveGrandmother:    , natural causesGrandfather: , natural causes1 sister(s) - healthy. 3 son(s) , 3 daughter(s) . Social History: Alcohol Use Patient does not use alcohol.  Smoking Status Patient is a never smoker. Caffeine: per day, coffee, soda, tea. Marital Status: . Lives w ith: alone. Children: yes, children. 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 I have reviewed the nurses notes, vitals, problem list, allergy list, medical history, family, social history and medications. Review of Symptoms  11 systems reviewed, negative other than as stated in the HPI      Physical Exam:      General: Well developed, in no acute distress, cooperative and alert  HEENT: No carotid bruits, no JVD, trach is midline. Neck Supple, PERRL, EOM intact. Heart:  Normal S1/S2 negative S3 or S4. Regular, no murmur, gallop or rub. Respiratory: Clear bilaterally x 4, no wheezing or rales  Abdomen:   Soft, non-tender, no masses, bowel sounds are active. Extremities:  No edema, normal cap refill, no cyanosis, atraumatic. Neuro: A&Ox3, speech clear, gait stable. Skin: Skin color is normal. No rashes or lesions. Non diaphoretic  Vascular: 2+ pulses symmetric in all extremities    Cardiographics    ECG: Normal sinus rhythm      Cardiology Labs:  Lab Results   Component Value Date/Time    Cholesterol, total 174 10/05/2020 09:12 AM    HDL Cholesterol 52 10/05/2020 09:12 AM    LDL, calculated 116 (H) 09/04/2019 08:39 AM    LDL Chol Calc (NIH) 106 (H) 10/05/2020 09:12 AM    Triglyceride 88 10/05/2020 09:12 AM    CHOL/HDL Ratio 3.2 08/07/2013 04:24 AM       Lab Results   Component Value Date/Time    Sodium 142 10/05/2020 09:12 AM    Potassium 4.2 10/05/2020 09:12 AM    Chloride 105 10/05/2020 09:12 AM    CO2 26 10/05/2020 09:12 AM    Anion gap 7 01/10/2014 05:17 PM    Glucose 94 10/05/2020 09:12 AM    BUN 15 10/05/2020 09:12 AM    Creatinine 1.28 (H) 10/05/2020 09:12 AM    BUN/Creatinine ratio 12 10/05/2020 09:12 AM    GFR est AA 62 10/05/2020 09:12 AM    GFR est non-AA 54 (L) 10/05/2020 09:12 AM    Calcium 9.5 10/05/2020 09:12 AM    Bilirubin, total 0.7 10/05/2020 09:12 AM    Alk.  phosphatase 60 10/05/2020 09:12 AM    Protein, total 6.9 10/05/2020 09:12 AM    Albumin 4.5 10/05/2020 09:12 AM    Globulin 3.5 01/10/2014 05:17 PM    A-G Ratio 1.9 10/05/2020 09:12 AM    ALT (SGPT) 20 10/05/2020 09:12 AM           Assessment:     Assessment:     Diagnoses and all orders for this visit:    1. Atherosclerosis of native coronary artery of native heart without angina pectoris    2. Essential hypertension, benign    3. Postsurgical percutaneous transluminal coronary angioplasty status    4. Dyslipidemia    5. S/P PTCA (percutaneous transluminal coronary angioplasty)        ICD-10-CM ICD-9-CM    1. Atherosclerosis of native coronary artery of native heart without angina pectoris  I25.10 414.01    2. Essential hypertension, benign  I10 401.1    3. Postsurgical percutaneous transluminal coronary angioplasty status  Z98.61 V45.82    4. Dyslipidemia  E78.5 272.4    5. S/P PTCA (percutaneous transluminal coronary angioplasty)  Z98.61 V45.82      No orders of the defined types were placed in this encounter. Plan:     1. Atherosclerotic heart disease: Remote history of PTCA stenting, asymptomatic of atherosclerotic heart disease continue current therapy  2. Hypertension: Stable 104/68 continue current medications most recent GFR 62  3. Hyperlipidemia:  with full-strength WelChol and Zetia 10 mg. Discussed previous statin intolerance with patient and in chart review had occurred with him being on either 40 mg or 80 mg of pravastatin, previous intolerance to TriCor. Will trial low-dose Crestor 5 mg in place of red yeast rice. Recheck labs in 90 days. Patient to call my office if he is experiencing statin related side effects. If intolerant to Crestor would then pursue PSK 9 therapy. Follow-up in 6 months   Guy Hendrickson NP      Please note that this dictation was completed with [employer], the [employer] voice recognition software.   Quite often unanticipated grammatical, syntax, homophones, and other interpretive errors are inadvertently transcribed by the computer software. Please disregard these errors. Please excuse any errors that have escaped final proofreading. Thank you.

## 2020-10-19 ENCOUNTER — OFFICE VISIT (OUTPATIENT)
Dept: CARDIOLOGY CLINIC | Age: 75
End: 2020-10-19
Payer: MEDICARE

## 2020-10-19 VITALS
WEIGHT: 149.2 LBS | HEIGHT: 66 IN | BODY MASS INDEX: 23.98 KG/M2 | HEART RATE: 74 BPM | TEMPERATURE: 94.8 F | OXYGEN SATURATION: 99 % | DIASTOLIC BLOOD PRESSURE: 68 MMHG | RESPIRATION RATE: 16 BRPM | SYSTOLIC BLOOD PRESSURE: 104 MMHG

## 2020-10-19 DIAGNOSIS — I25.10 ATHEROSCLEROSIS OF NATIVE CORONARY ARTERY OF NATIVE HEART WITHOUT ANGINA PECTORIS: Primary | ICD-10-CM

## 2020-10-19 DIAGNOSIS — I10 ESSENTIAL HYPERTENSION, BENIGN: ICD-10-CM

## 2020-10-19 DIAGNOSIS — Z98.61 S/P PTCA (PERCUTANEOUS TRANSLUMINAL CORONARY ANGIOPLASTY): ICD-10-CM

## 2020-10-19 DIAGNOSIS — Z98.61 POSTSURGICAL PERCUTANEOUS TRANSLUMINAL CORONARY ANGIOPLASTY STATUS: ICD-10-CM

## 2020-10-19 DIAGNOSIS — E78.5 DYSLIPIDEMIA: ICD-10-CM

## 2020-10-19 PROCEDURE — G8427 DOCREV CUR MEDS BY ELIG CLIN: HCPCS | Performed by: NURSE PRACTITIONER

## 2020-10-19 PROCEDURE — G8420 CALC BMI NORM PARAMETERS: HCPCS | Performed by: NURSE PRACTITIONER

## 2020-10-19 PROCEDURE — 99214 OFFICE O/P EST MOD 30 MIN: CPT | Performed by: NURSE PRACTITIONER

## 2020-10-19 PROCEDURE — G8432 DEP SCR NOT DOC, RNG: HCPCS | Performed by: NURSE PRACTITIONER

## 2020-10-19 PROCEDURE — G8754 DIAS BP LESS 90: HCPCS | Performed by: NURSE PRACTITIONER

## 2020-10-19 PROCEDURE — G8752 SYS BP LESS 140: HCPCS | Performed by: NURSE PRACTITIONER

## 2020-10-19 PROCEDURE — 1101F PT FALLS ASSESS-DOCD LE1/YR: CPT | Performed by: NURSE PRACTITIONER

## 2020-10-19 PROCEDURE — G8536 NO DOC ELDER MAL SCRN: HCPCS | Performed by: NURSE PRACTITIONER

## 2020-10-19 PROCEDURE — 93000 ELECTROCARDIOGRAM COMPLETE: CPT | Performed by: NURSE PRACTITIONER

## 2020-10-19 RX ORDER — ROSUVASTATIN CALCIUM 5 MG/1
5 TABLET, COATED ORAL
Qty: 90 TAB | Refills: 1 | Status: SHIPPED | OUTPATIENT
Start: 2020-10-19 | End: 2020-10-29 | Stop reason: SDUPTHER

## 2020-10-19 NOTE — PROGRESS NOTES
Identified pt with two pt identifiers(name and ). Reviewed record in preparation for visit and have obtained necessary documentation. Chief Complaint   Patient presents with    Follow-up    Hypertension    Cholesterol Problem           Visit Vitals  Temp (!) 94.8 °F (34.9 °C) (Temporal)   Ht 5' 6\" (1.676 m)   Wt 149 lb 3.2 oz (67.7 kg)   BMI 24.08 kg/m²     Pain Scale: 0 - No pain/10    Coordination of Care Questionnaire:  :   1. Have you been to the ER, urgent care clinic since your last visit? Hospitalized since your last visit? No    2. Have you seen or consulted any other health care providers outside of the [address] [place] [address] since your last visit? Include any pap smears or colon screening.  Yes

## 2020-10-29 RX ORDER — EZETIMIBE 10 MG/1
TABLET ORAL
Qty: 90 TAB | Refills: 3 | Status: SHIPPED | OUTPATIENT
Start: 2020-10-29 | End: 2021-06-15 | Stop reason: SDUPTHER

## 2020-10-29 RX ORDER — ROSUVASTATIN CALCIUM 5 MG/1
5 TABLET, COATED ORAL
Qty: 90 TAB | Refills: 3 | Status: SHIPPED | OUTPATIENT
Start: 2020-10-29 | End: 2020-12-17 | Stop reason: SINTOL

## 2020-10-29 RX ORDER — LISINOPRIL AND HYDROCHLOROTHIAZIDE 12.5; 2 MG/1; MG/1
1 TABLET ORAL DAILY
Qty: 90 TAB | Refills: 3 | Status: SHIPPED | OUTPATIENT
Start: 2020-10-29 | End: 2021-10-14 | Stop reason: SDUPTHER

## 2020-12-17 RX ORDER — BEMPEDOIC ACID 180 MG/1
1 TABLET, FILM COATED ORAL DAILY
Qty: 30 TAB | Refills: 2 | Status: SHIPPED | OUTPATIENT
Start: 2020-12-17

## 2020-12-30 ENCOUNTER — OFFICE VISIT (OUTPATIENT)
Dept: INTERNAL MEDICINE CLINIC | Age: 75
End: 2020-12-30
Payer: MEDICARE

## 2020-12-30 VITALS
SYSTOLIC BLOOD PRESSURE: 112 MMHG | HEIGHT: 66 IN | HEART RATE: 71 BPM | DIASTOLIC BLOOD PRESSURE: 71 MMHG | WEIGHT: 146.8 LBS | TEMPERATURE: 97.7 F | BODY MASS INDEX: 23.59 KG/M2 | OXYGEN SATURATION: 97 % | RESPIRATION RATE: 16 BRPM

## 2020-12-30 DIAGNOSIS — M54.42 CHRONIC LEFT-SIDED LOW BACK PAIN WITH LEFT-SIDED SCIATICA: ICD-10-CM

## 2020-12-30 DIAGNOSIS — Z13.1 SCREENING FOR DIABETES MELLITUS: ICD-10-CM

## 2020-12-30 DIAGNOSIS — E78.5 DYSLIPIDEMIA: ICD-10-CM

## 2020-12-30 DIAGNOSIS — G89.29 CHRONIC LEFT-SIDED LOW BACK PAIN WITH LEFT-SIDED SCIATICA: ICD-10-CM

## 2020-12-30 DIAGNOSIS — M15.9 PRIMARY OSTEOARTHRITIS INVOLVING MULTIPLE JOINTS: ICD-10-CM

## 2020-12-30 DIAGNOSIS — I10 ESSENTIAL HYPERTENSION, BENIGN: ICD-10-CM

## 2020-12-30 DIAGNOSIS — Z00.00 MEDICARE ANNUAL WELLNESS VISIT, SUBSEQUENT: Primary | ICD-10-CM

## 2020-12-30 DIAGNOSIS — Z98.61 S/P PTCA (PERCUTANEOUS TRANSLUMINAL CORONARY ANGIOPLASTY): ICD-10-CM

## 2020-12-30 PROCEDURE — G0439 PPPS, SUBSEQ VISIT: HCPCS | Performed by: INTERNAL MEDICINE

## 2020-12-30 PROCEDURE — 1101F PT FALLS ASSESS-DOCD LE1/YR: CPT | Performed by: INTERNAL MEDICINE

## 2020-12-30 PROCEDURE — G8752 SYS BP LESS 140: HCPCS | Performed by: INTERNAL MEDICINE

## 2020-12-30 PROCEDURE — G8427 DOCREV CUR MEDS BY ELIG CLIN: HCPCS | Performed by: INTERNAL MEDICINE

## 2020-12-30 PROCEDURE — G8432 DEP SCR NOT DOC, RNG: HCPCS | Performed by: INTERNAL MEDICINE

## 2020-12-30 PROCEDURE — G8420 CALC BMI NORM PARAMETERS: HCPCS | Performed by: INTERNAL MEDICINE

## 2020-12-30 PROCEDURE — 99213 OFFICE O/P EST LOW 20 MIN: CPT | Performed by: INTERNAL MEDICINE

## 2020-12-30 PROCEDURE — G8754 DIAS BP LESS 90: HCPCS | Performed by: INTERNAL MEDICINE

## 2020-12-30 PROCEDURE — G8536 NO DOC ELDER MAL SCRN: HCPCS | Performed by: INTERNAL MEDICINE

## 2020-12-30 NOTE — PROGRESS NOTES
Chief Complaint   Patient presents with    Annual Wellness Visit    Pain (Chronic)           1. Have you been to the ER, urgent care clinic since your last visit? Hospitalized since your last visit? Yes When: 12/15/2020 [facility] Reason for visit: pain    2. Have you seen or consulted any other health care providers outside of the [address] [place] [address] Gabriele since your last visit? Include any pap smears or colon screening. No    This is the Subsequent Medicare Annual Wellness Exam, performed 12 months or more after the Initial AWV or the last Subsequent AWV    I have reviewed the patient's medical history in detail and updated the computerized patient record. Depression Risk Factor Screening:     3 most recent PHQ Screens 4/17/2020   Little interest or pleasure in doing things Not at all   Feeling down, depressed, irritable, or hopeless Not at all   Total Score PHQ 2 0       Alcohol Risk Screen    Do you average more than 1 drink per night or more than 7 drinks a week: No    In the past three months have you have had more than 4 drinks containing alcohol on one occasion: No        Functional Ability and Level of Safety:    Hearing: Hearing is good. Activities of Daily Living: The home contains: no safety equipment. Patient does total self care      Ambulation: with no difficulty     Fall Risk:  Fall Risk Assessment, last 12 mths 4/17/2020   Able to walk? Yes   Fall in past 12 months? No      Abuse Screen:  Patient is not abused       Cognitive Screening    Has your family/caregiver stated any concerns about your memory: no     Cognitive Screening: N/A    Assessment/Plan   Education and counseling provided:  Are appropriate based on today's review and evaluation    Diagnoses and all orders for this visit:    1. Medicare annual wellness visit, subsequent    2.  Screening for diabetes mellitus        Health Maintenance Due     Health Maintenance Due   Topic Date Due    Flu Vaccine (1) 09/01/2020 Patient Care Team   Patient Care Team:  Troy Walker MD as PCP - General (Internal Medicine)  Troy Walker MD as PCP - [facility]  Roddy Gaucher, NP as Nurse Practitioner (Nurse Practitioner)    History     Patient Active Problem List   Diagnosis Code    DJD (degenerative joint disease) M19.90    Postsurgical percutaneous transluminal coronary angioplasty status Z98.61    Coronary atherosclerosis of native coronary artery I25.10    Carotid sinus syndrome G90.01    Chest pain, unspecified R07.9    Essential hypertension, benign I10    Dyslipidemia E78.5    Esophageal reflux K21.9    Intractable pain R52    Right lumbar radiculopathy M54.16    S/P PTCA (percutaneous transluminal coronary angioplasty) Z98.61    Encephalopathy, unspecified G93.40    Dizziness R42    Fatigue R53.83    Drug therapy Z79.899    Chronic left-sided low back pain with sciatica M54.40, G89.29    S/P colonoscopy Z98.890    Back pain M54.9     Past Medical History:   Diagnosis Date    Asthma     hx asthma - no attack in 40+ yrs    Back pain     CAD (coronary artery disease)     Chest pain, unspecified     Chest pain, unspecified     Chest pain, unspecified     Coronary atherosclerosis of native coronary artery 07/17/2012    stent    Coronary atherosclerosis of native coronary artery     Essential hypertension     Essential hypertension, benign     Lumbar spinal stenosis 08/03/2013    SPINE LUMBAR LAMINECTOMY WITH INSTRUMENTATION - p tamiko    Mixed hyperlipidemia     Other ill-defined conditions(799.89)     HIGH CHOLESTEROL    Other malaise and fatigue     Pure hypercholesterolemia     S/P colonoscopy 01/17/2018    md hanh tics      Past Surgical History:   Procedure Laterality Date    COLONOSCOPY N/A 1/17/2018    COLONOSCOPY performed by Adan Lopez MD at [facility] ENDOSCOPY    [facility]  X2    HX GI      COLONOSCOPY    HX HEART CATHETERIZATION      STENT (DR LARSON)    HX LUMBAR LAMINECTOMY  8/2013    HX PTCA       Current Outpatient Medications   Medication Sig Dispense Refill    bempedoic acid (Nexletol) 180 mg tab Take 1 Tab by mouth daily. 30 Tab 2    ezetimibe (ZETIA) 10 mg tablet TAKE 1 TABLET BY MOUTH ONCE DAILY 90 Tab 3    lisinopril-hydroCHLOROthiazide (PRINZIDE, ZESTORETIC) 20-12.5 mg per tablet Take 1 Tab by mouth daily. 90 Tab 3    WelChoL 625 mg tablet TAKE 3 TABLETS BY MOUTH TWICE DAILY WITH MEALS 180 Tab 5    cholecalciferol, vitamin D3, (Vitamin D3) 50 mcg (2,000 unit) tab Take 2,000 Units by mouth.  ascorbic acid, vitamin C, (Vitamin C) 500 mg tablet Take  by mouth.  lansoprazole (PREVACID) 15 mg capsule Take  by mouth Daily (before breakfast).  DOCUSATE CALCIUM (STOOL SOFTENER PO) Take 100 mg by mouth as needed.  aspirin delayed-release 81 mg tablet Take 81 mg by mouth daily.  flaxseed 1,000 mg cap Take 1 Cap by mouth daily. Allergies   Allergen Reactions    Statins-Hmg-Coa Reductase Inhibitors Other (comments)     Body aches       Family History   Problem Relation Age of Onset    Cancer Mother         female   Kirk Pike Anesth Problems Neg Hx      Social History     Tobacco Use    Smoking status: Never Smoker    Smokeless tobacco: Never Used   Substance Use Topics    Alcohol use:  No

## 2020-12-30 NOTE — PROGRESS NOTES
SPORTS MEDICINE [facility] PRIMARY CARE  Hafsa Quezada MD, [id number] [facility],[address] [address]  Phone:  [phone]  Fax: [phone]      Chief Complaint   Patient presents with   [facility] Annual Wellness Visit    Pain (Chronic)         SUBECTIVE:    Laura Arshad. is a 66 y.o. male Patient returns today with a known history of dyslipidemia, DJD, primary hypertension, S/P PTCA, chronic low back pain, and is seen for evaluation. Patient returns today saying he saw Dr. Martinez Carver, who gave him three injections in his back with improvement of his back, and now he comes in today still having discomfort in his back and pain in shoulder and he wanted to put some type of implant in that he desired not to have and primarily preferred for us to give him a referral to a different pain management doctor. Current Outpatient Medications   Medication Sig Dispense Refill    bempedoic acid (Nexletol) 180 mg tab Take 1 Tab by mouth daily. 30 Tab 2    ezetimibe (ZETIA) 10 mg tablet TAKE 1 TABLET BY MOUTH ONCE DAILY 90 Tab 3    lisinopril-hydroCHLOROthiazide (PRINZIDE, ZESTORETIC) 20-12.5 mg per tablet Take 1 Tab by mouth daily. 90 Tab 3    WelChoL 625 mg tablet TAKE 3 TABLETS BY MOUTH TWICE DAILY WITH MEALS 180 Tab 5    cholecalciferol, vitamin D3, (Vitamin D3) 50 mcg (2,000 unit) tab Take 2,000 Units by mouth.  ascorbic acid, vitamin C, (Vitamin C) 500 mg tablet Take  by mouth.  lansoprazole (PREVACID) 15 mg capsule Take  by mouth Daily (before breakfast).  DOCUSATE CALCIUM (STOOL SOFTENER PO) Take 100 mg by mouth as needed.  aspirin delayed-release 81 mg tablet Take 81 mg by mouth daily.  flaxseed 1,000 mg cap Take 1 Cap by mouth daily.        Past Medical History:   Diagnosis Date    Asthma     hx asthma - no attack in 40+ yrs    Back pain     CAD (coronary artery disease)     Chest pain, unspecified     Chest pain, unspecified     Chest pain, unspecified     Coronary atherosclerosis of native coronary artery 07/17/2012    stent    Coronary atherosclerosis of native coronary artery     Essential hypertension     Essential hypertension, benign     Lumbar spinal stenosis 08/03/2013    SPINE LUMBAR LAMINECTOMY WITH INSTRUMENTATION - p tamiko    Mixed hyperlipidemia     Other ill-defined conditions(799.89)     HIGH CHOLESTEROL    Other malaise and fatigue     Pure hypercholesterolemia     S/P colonoscopy 01/17/2018    md hanh tics     Past Surgical History:   Procedure Laterality Date    COLONOSCOPY N/A 1/17/2018    COLONOSCOPY performed by Armond Ramesh MD at [address]  X2    HX GI      COLONOSCOPY    HX HEART CATHETERIZATION      STENT (DR LARSON)    HX LUMBAR LAMINECTOMY  8/2013    HX PTCA       Allergies   Allergen Reactions    Statins-Hmg-Coa Reductase Inhibitors Other (comments)     Body aches       REVIEW OF SYSTEMS:   No chest pain, no shortness of breath. Social History     Socioeconomic History    Marital status:      Spouse name: Not on file    Number of children: Not on file    Years of education: Not on file    Highest education level: Not on file   Tobacco Use    Smoking status: Never Smoker    Smokeless tobacco: Never Used   Substance and Sexual Activity    Alcohol use: No    Drug use: No    Sexual activity: Not Currently     Partners: Female   Social History Narrative    Medical History: BPHEDelevated PSACholesterolHypertensionMRI September 2, 2011 leftw bonnie convexity along the mid to low er lumbar spine    and a rightw bonnie convexity along the thoracolumbar junction. 2 the patient has multilevel degenerative disc disease. 3 there is moderate spinal    stenosis at L3-4 and L4-5 levels. In addition there is facet arthropathy and asymmetrical disc protrusions at L3-4 and L4-5 levels.  There is a    narrow ing of the lateral recesses w ith the potential for extrinsic compression upon the origins of the L4 and the L5 nerve roots respectivelyCT    abdomen 2003 malrotation left kidney otherw ise normalCarotid sinus syndrome    Surgical History: colonoscopy by Michelle Reilly M.D. [id number] quiles md [id number] S1 laminectomy 2013    Hospitalization/Major Diagnostic Procedure: prolonged syncop'e 06    Family History: Mother: , cancerFather: , unknow nSister(s): aliveBrother(s): aliveDaughter(s): aliveGrandmother:    , natural causesGrandfather: , natural causes1 sister(s) - healthy. 3 son(s) , 3 daughter(s) . Social History: Alcohol Use Patient does not use alcohol. Smoking Status Patient is a never smoker. Caffeine: per day, coffee, soda, tea. Marital Status: . Lives w ith: alone. Children: yes, children. 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   r  Family History   Problem Relation Age of Onset    Cancer Mother         female    Anesth Problems Neg Hx        OBJECTIVE:  Visit Vitals  /71   Pulse 71   Temp 97.7 °F (36.5 °C) (Oral)   Resp 16   Ht 5' 6\" (1.676 m)   Wt 146 lb 12.8 oz (66.6 kg)   SpO2 97%   BMI 23.69 kg/m²     ENT: perrla,  eom intact  NECK: supple. Thyroid normal  CHEST: clear to ascultation and percussion   HEART: regular rate and rhythm  ABD: soft, bowel sounds active  EXTREMITIES: no edema, pulse 1+     No visits with results within 3 Month(s) from this visit.    Latest known visit with results is:   Orders Only on 2020   Component Date Value Ref Range Status    Glucose 10/05/2020 94  65 - 99 mg/dL Final    BUN 10/05/2020 15  8 - 27 mg/dL Final    Creatinine 10/05/2020 1.28* 0.76 - 1.27 mg/dL Final    GFR est non-AA 10/05/2020 54* >59 mL/min/1.73 Final    GFR est AA 10/05/2020 62  >59 mL/min/1.73 Final    BUN/Creatinine ratio 10/05/2020 12  10 - 24 Final    Sodium 10/05/2020 142  134 - 144 mmol/L Final    Potassium 10/05/2020 4.2  3.5 - 5.2 mmol/L Final    Chloride 10/05/2020 105  96 - 106 mmol/L Final    CO2 10/05/2020 26  20 - 29 mmol/L Final    Calcium 10/05/2020 9.5  8.6 - 10.2 mg/dL Final    Protein, total 10/05/2020 6.9  6.0 - 8.5 g/dL Final    Albumin 10/05/2020 4.5  3.7 - 4.7 g/dL Final    GLOBULIN, TOTAL 10/05/2020 2.4  1.5 - 4.5 g/dL Final    A-G Ratio 10/05/2020 1.9  1.2 - 2.2 Final    Bilirubin, total 10/05/2020 0.7  0.0 - 1.2 mg/dL Final    Alk. phosphatase 10/05/2020 60  39 - 117 IU/L Final    AST (SGOT) 10/05/2020 24  0 - 40 IU/L Final    ALT (SGPT) 10/05/2020 20  0 - 44 IU/L Final    Cholesterol, total 10/05/2020 174  100 - 199 mg/dL Final    Triglyceride 10/05/2020 88  0 - 149 mg/dL Final    HDL Cholesterol 10/05/2020 52  >39 mg/dL Final    VLDL, calculated 10/05/2020 16  5 - 40 mg/dL Final    LDL, calculated 10/05/2020 106* 0 - 99 mg/dL Final    Creatine Kinase,Total 10/05/2020 547* 41 - 331 U/L Final          ASSESSMENT:  1. Dyslipidemia    2. Medicare annual wellness visit, subsequent    3. Screening for diabetes mellitus    4. Primary osteoarthritis involving multiple joints    5. Essential hypertension, benign    6. S/P PTCA (percutaneous transluminal coronary angioplasty)    7. Chronic left-sided low back pain with left-sided sciatica      The patient has a known history of dyslipidemia, is currently on Zetia, as he has intolerance to statins. He has osteoarthritis of other joints, but primarily his back is the issue. Blood pressure control is excellent on the current regimen of Lisinopril 20/12. 5. CAD is currently asymptomatic. There is a major issue with his back. We offer orthopedic spine specialist referral versus pain management. He would like to see pain management. He will be back to see us in three to four months or as needed. I have discussed the diagnosis with the patient and the intended plan as seen in the  orders above. The patient understands and agees with the plan.   The patient has   received an after visit summary and questions were answered concerning  future plans  Patient labs and/or xrays were reviewed  Past records were reviewed. PLAN:  . No orders of the defined types were placed in this encounter. ATTENTION:   This medical record was transcribed using an electronic medical records system. Although proofread, it may and can contain electronic and spelling errors. Other human spelling and other errors may be present. Corrections may be executed at a later time. Please feel free to contact us for any clarifications as needed.

## 2020-12-30 NOTE — PATIENT INSTRUCTIONS
Medicare Wellness Visit, Male The best way to live healthy is to have a lifestyle where you eat a well-balanced diet, exercise regularly, limit alcohol use, and quit all forms of tobacco/nicotine, if applicable. Regular preventive services are another way to keep healthy. Preventive services (vaccines, screening tests, monitoring & exams) can help personalize your care plan, which helps you manage your own care. Screening tests can find health problems at the earliest stages, when they are easiest to treat. [facility] follows the current, evidence-based guidelines published by the [facility] ([facility]) when recommending preventive services for our patients. Because we follow these guidelines, sometimes recommendations change over time as research supports it. (For example, a prostate screening blood test is no longer routinely recommended for men with no symptoms). Of course, you and your doctor may decide to screen more often for some diseases, based on your risk and co-morbidities (chronic disease you are already diagnosed with). Preventive services for you include: - Medicare offers their members a free annual wellness visit, which is time for you and your primary care provider to discuss and plan for your preventive service needs. Take advantage of this benefit every year! 
-All adults over age 72 should receive the recommended pneumonia vaccines. Current USPSTF guidelines recommend a series of two vaccines for the best pneumonia protection.  
-All adults should have a flu vaccine yearly and tetanus vaccine every 10 years. 
-All adults age 48 and older should receive the shingles vaccines (series of two vaccines). -All adults age 38-68 who are overweight should have a diabetes screening test once every three years. -Other screening tests & preventive services for persons with diabetes include: an eye exam to screen for diabetic retinopathy, a kidney function test, a foot exam, and stricter control over your cholesterol.  
-Cardiovascular screening for adults with routine risk involves an electrocardiogram (ECG) at intervals determined by the provider.  
-Colorectal cancer screening should be done for adults age 54-65 with no increased risk factors for colorectal cancer. There are a number of acceptable methods of screening for this type of cancer. Each test has its own benefits and drawbacks. Discuss with your provider what is most appropriate for you during your annual wellness visit. The different tests include: colonoscopy (considered the best screening method), a fecal occult blood test, a fecal DNA test, and sigmoidoscopy. 
-All adults born between [facility] should be screened once for Hepatitis C. 
-An Abdominal Aortic Aneurysm (AAA) Screening is recommended for men age 73-68 who has ever smoked in their lifetime. Here is a list of your current Health Maintenance items (your personalized list of preventive services) with a due date: 
Health Maintenance Due Topic Date Due  Yearly Flu Vaccine (1) 09/01/2020

## 2021-01-19 DIAGNOSIS — I10 ESSENTIAL HYPERTENSION, BENIGN: ICD-10-CM

## 2021-01-19 DIAGNOSIS — E78.5 DYSLIPIDEMIA: ICD-10-CM

## 2021-01-19 DIAGNOSIS — I25.10 ATHEROSCLEROSIS OF NATIVE CORONARY ARTERY OF NATIVE HEART WITHOUT ANGINA PECTORIS: ICD-10-CM

## 2021-01-19 DIAGNOSIS — Z98.61 POSTSURGICAL PERCUTANEOUS TRANSLUMINAL CORONARY ANGIOPLASTY STATUS: ICD-10-CM

## 2021-01-19 DIAGNOSIS — Z98.61 S/P PTCA (PERCUTANEOUS TRANSLUMINAL CORONARY ANGIOPLASTY): ICD-10-CM

## 2021-02-05 ENCOUNTER — TRANSCRIBE ORDER (OUTPATIENT)
Dept: SCHEDULING | Age: 76
End: 2021-02-05

## 2021-02-05 DIAGNOSIS — M54.12 RADICULOPATHY, CERVICAL REGION: Primary | ICD-10-CM

## 2021-02-19 ENCOUNTER — HOSPITAL ENCOUNTER (OUTPATIENT)
Dept: MRI IMAGING | Age: 76
Discharge: HOME | End: 2021-02-19
Attending: ORTHOPAEDIC SURGERY
Payer: MEDICARE

## 2021-02-19 DIAGNOSIS — M54.12 RADICULOPATHY, CERVICAL REGION: ICD-10-CM

## 2021-02-19 PROCEDURE — 72141 MRI NECK SPINE W/O DYE: CPT

## 2021-03-19 ENCOUNTER — TELEPHONE (OUTPATIENT)
Dept: CARDIOLOGY CLINIC | Age: 76
End: 2021-03-19

## 2021-03-19 NOTE — TELEPHONE ENCOUNTER
Received letter from [facility] stating that Welchol 625 mg is not covered and the alternative option would be cholestyramine Light Powder for Susp in a packet. Called pt to ask if he is ok to switch medication. Called, left vm for pt to return call to office.

## 2021-03-24 RX ORDER — CHOLESTYRAMINE 4 G/9G
1 POWDER, FOR SUSPENSION ORAL 2 TIMES DAILY WITH MEALS
Qty: 180 PACKET | Refills: 1 | Status: SHIPPED | OUTPATIENT
Start: 2021-03-24 | End: 2021-06-16

## 2021-04-02 NOTE — TELEPHONE ENCOUNTER
STEVIE Lane NP 9 days ago     Pill form not covered by [facility], would like prescribed in powder form.

## 2021-04-18 LAB
ALBUMIN SERPL-MCNC: 4.5 G/DL (ref 3.7–4.7)
ALBUMIN/GLOB SERPL: 1.7 {RATIO} (ref 1.2–2.2)
ALP SERPL-CCNC: 65 IU/L (ref 39–117)
ALT SERPL-CCNC: 14 IU/L (ref 0–44)
AST SERPL-CCNC: 20 IU/L (ref 0–40)
BILIRUB SERPL-MCNC: 0.3 MG/DL (ref 0–1.2)
BUN SERPL-MCNC: 13 MG/DL (ref 8–27)
BUN/CREAT SERPL: 10 (ref 10–24)
CALCIUM SERPL-MCNC: 9.7 MG/DL (ref 8.6–10.2)
CHLORIDE SERPL-SCNC: 102 MMOL/L (ref 96–106)
CHOLEST SERPL-MCNC: 144 MG/DL (ref 100–199)
CK SERPL-CCNC: 465 U/L (ref 41–331)
CO2 SERPL-SCNC: 23 MMOL/L (ref 20–29)
CREAT SERPL-MCNC: 1.31 MG/DL (ref 0.76–1.27)
GLOBULIN SER CALC-MCNC: 2.6 G/DL (ref 1.5–4.5)
GLUCOSE SERPL-MCNC: 97 MG/DL (ref 65–99)
HDLC SERPL-MCNC: 52 MG/DL
LDLC SERPL CALC-MCNC: 81 MG/DL (ref 0–99)
POTASSIUM SERPL-SCNC: 4.2 MMOL/L (ref 3.5–5.2)
PROT SERPL-MCNC: 7.1 G/DL (ref 6–8.5)
SODIUM SERPL-SCNC: 141 MMOL/L (ref 134–144)
TRIGL SERPL-MCNC: 48 MG/DL (ref 0–149)
VLDLC SERPL CALC-MCNC: 11 MG/DL (ref 5–40)

## 2021-04-19 NOTE — PROGRESS NOTES
Q: Please call patient cholesterol is within reasonable level. LDL 81. Continue Questran powder and Zetia. Follow a low-fat diet. Muscle enzyme is also down.

## 2021-04-20 NOTE — PROGRESS NOTES
Placed call to pt. Two pt identifiers confirmed. Pt informed that per NP Harris Ayers is within reasonable level. LDL 81. Continue Questran powder and Zetia. Follow a low-fat diet. Muscle enzyme is also down. \"  Pt states he has still been taking Welchol 625 mg since that refill was paid. Informed pt that an alternative medication called cholestyramine Cole Felipe) was sent to his pharmacy since welchol is not covered by his insurance. Pt states he is almost out of Welchol and will start cholestyramine (Rebecca Grimaldo) once welchol runs out.

## 2021-04-20 NOTE — TELEPHONE ENCOUNTER
Message from result note copied below:     Jeyson Escobar   4/20/2021  2:44 PM EDT      Placed call to pt. Two pt identifiers confirmed. Pt informed that per NP Binu Espinal is within reasonable level.  LDL 81.  Continue Questran powder and Zetia.  Follow a low-fat diet.  Muscle enzyme is also down. \"  Pt states he has still been taking Welchol 625 mg since that refill was paid.  Informed pt that an alternative medication called cholestyramine Henrik Reyez) was sent to his pharmacy since welchol is not covered by his insurance. Pt states he is almost out of Welchol and will start cholestyramine (Bakari Coreas) once welchol runs out.

## 2021-04-22 NOTE — PROGRESS NOTES
[facility] @ [address], [place]  Subjective/HPI:     Parish Varner is a 66 y.o. male with remote history of PTCA stenting for atherosclerotic heart disease, hypertension, hyperlipidemia/statin intolerance is here for routine f/u and cardiac clearance. Mr. Favio Pineda is preop for what sounds like cervical fusion and tabby placement in approximately 1 month at [facility].  He reports having increased fatigue generalized weakness activities have been gated due to his neck and back discomfort. Denies exertional chest pain but not exerting or active as he used to be. Due to insurance coverage Welchol has been replaced with Questran, he is finishing up his current WelChol prescription then will transition to [facility]. 10/2020 Visit  1.  Atherosclerotic heart disease: Remote history of PTCA stenting, asymptomatic of atherosclerotic heart disease continue current therapy  2.  Hypertension: Stable 104/68 continue current medications most recent GFR 62  3.  Hyperlipidemia:  with full-strength WelChol and Zetia 10 mg. Discussed previous statin intolerance with patient and in chart review had occurred with him being on either 40 mg or 80 mg of pravastatin, previous intolerance to TriCor. Will trial low-dose Crestor 5 mg in place of red yeast rice. Recheck labs in 90 days. Patient to call my office if he is experiencing statin related side effects. If intolerant to Crestor would then pursue PSK 9 therapy.   Follow-up in 6 months  PCP Provider  Mindi Reid MD  Past Medical History:   Diagnosis Date    Asthma     hx asthma - no attack in 40+ yrs    Back pain     CAD (coronary artery disease)     Chest pain, unspecified     Chest pain, unspecified     Chest pain, unspecified     Coronary atherosclerosis of native coronary artery 07/17/2012    stent    Coronary atherosclerosis of native coronary artery     Essential hypertension     Essential hypertension, benign     Lumbar spinal stenosis 08/03/2013    SPINE LUMBAR LAMINECTOMY WITH INSTRUMENTATION - p tamiko    Mixed hyperlipidemia     Other ill-defined conditions(799.89)     HIGH CHOLESTEROL    Other malaise and fatigue     Pure hypercholesterolemia     S/P colonoscopy 01/17/2018    md hanh tics      Past Surgical History:   Procedure Laterality Date    COLONOSCOPY N/A 1/17/2018    COLONOSCOPY performed by Naomy Nagel MD at [address] [place] [address] ENDOSCOPY    HX [address]  X2    HX GI      COLONOSCOPY    HX HEART CATHETERIZATION      STENT (DR Kemal Goff)    HX LUMBAR LAMINECTOMY  8/2013    HX PTCA       Allergies   Allergen Reactions    Statins-Hmg-Coa Reductase Inhibitors Other (comments)     Body aches      Family History   Problem Relation Age of Onset    Cancer Mother         female   Ciprius.Deck Anesth Problems Neg Hx       Current Outpatient Medications   Medication Sig    cholestyramine (QUESTRAN) 4 gram packet Take 1 Packet by mouth two (2) times daily (with meals).  bempedoic acid (Nexletol) 180 mg tab Take 1 Tab by mouth daily.  ezetimibe (ZETIA) 10 mg tablet TAKE 1 TABLET BY MOUTH ONCE DAILY    lisinopril-hydroCHLOROthiazide (PRINZIDE, ZESTORETIC) 20-12.5 mg per tablet Take 1 Tab by mouth daily.  cholecalciferol, vitamin D3, (Vitamin D3) 50 mcg (2,000 unit) tab Take 2,000 Units by mouth.  ascorbic acid, vitamin C, (Vitamin C) 500 mg tablet Take  by mouth.  lansoprazole (PREVACID) 15 mg capsule Take  by mouth Daily (before breakfast).  DOCUSATE CALCIUM (STOOL SOFTENER PO) Take 100 mg by mouth as needed.  aspirin delayed-release 81 mg tablet Take 81 mg by mouth daily.  flaxseed 1,000 mg cap Take 1 Cap by mouth daily. No current facility-administered medications for this visit. There were no vitals filed for this visit.   Social History     Socioeconomic History    Marital status:      Spouse name: Not on file    Number of children: Not on file    Years of education: Not on file    Highest education level: Not on file   Occupational History    Not on file   Social Needs    Financial resource strain: Not on file    Food insecurity     Worry: Not on file     Inability: Not on file    Transportation needs     Medical: Not on file     Non-medical: Not on file   Tobacco Use    Smoking status: Never Smoker    Smokeless tobacco: Never Used   Substance and Sexual Activity    Alcohol use: No    Drug use: No    Sexual activity: Not Currently     Partners: Female   Lifestyle    Physical activity     Days per week: Not on file     Minutes per session: Not on file    Stress: Not on file   Relationships    Social connections     Talks on phone: Not on file     Gets together: Not on file     Attends Denominational service: Not on file     Active member of club or organization: Not on file     Attends meetings of clubs or organizations: Not on file     Relationship status: Not on file    Intimate partner violence     Fear of current or ex partner: Not on file     Emotionally abused: Not on file     Physically abused: Not on file     Forced sexual activity: Not on file   Other Topics Concern    Not on file   Social History Narrative    Medical History: BPHEDelevated PSACholesterolHypertensionMRI September 2, 2011 leftw bonnie convexity along the mid to low er lumbar spine    and a rightw bonnie convexity along the thoracolumbar junction. 2 the patient has multilevel degenerative disc disease. 3 there is moderate spinal    stenosis at L3-4 and L4-5 levels. In addition there is facet arthropathy and asymmetrical disc protrusions at L3-4 and L4-5 levels.  There is a    narrow ing of the lateral recesses w ith the potential for extrinsic compression upon the origins of the L4 and the L5 nerve roots respectivelyCT    abdomen March 21, 2003 malrotation left kidney otherw ise normalCarotid sinus syndrome    Surgical History: colonoscopy by Jody quiles md [id number] S1 laminectomy 2013    Hospitalization/Major Diagnostic Procedure: prolonged syncop'e 06    Family History: Mother: , cancerFather: , unknow nSister(s): aliveBrother(s): aliveDaughter(s): aliveGrandmother:    , natural causesGrandfather: , natural causes1 sister(s) - healthy. 3 son(s) , 3 daughter(s) . Social History: Alcohol Use Patient does not use alcohol. Smoking Status Patient is a never smoker. Caffeine: per day, coffee, soda, tea. Marital Status: . Lives w ith: alone. Children: yes, children. Occupation/W ork: retired - [employer]. I have reviewed the nurses notes, vitals, problem list, allergy list, medical history, family, social history and medications. Review of Symptoms  11 systems reviewed, negative other than as stated in the HPI      Physical Exam:      General: Well developed, mildly kyphoscoliotic in no acute distress, cooperative and alert  HEENT: No carotid bruits, no JVD, trach is midline. Neck Supple, PERRL, EOM intact. Heart:  Normal S1/S2 negative S3 or S4. Regular, no murmur, gallop or rub. Respiratory: Clear bilaterally x 4, no wheezing or rales  Abdomen:   Soft, non-tender, no masses, bowel sounds are active. Extremities:  No edema, normal cap refill, no cyanosis, atraumatic. Neuro: A&Ox3, speech clear, gait stable. Skin: Skin color is normal. No rashes or lesions.  Non diaphoretic  Vascular: 2+ pulses symmetric in all extremities    Cardiographics    ECG: Sinus rhythm        Cardiology Labs:  Lab Results   Component Value Date/Time    Cholesterol, total 144 2021 08:45 AM    HDL Cholesterol 52 2021 08:45 AM    LDL, calculated 81 2021 08:45 AM    LDL, calculated 116 (H) 2019 08:39 AM    Triglyceride 48 2021 08:45 AM    CHOL/HDL Ratio 3.2 2013 04:24 AM       Lab Results   Component Value Date/Time Sodium 141 04/17/2021 08:45 AM    Potassium 4.2 04/17/2021 08:45 AM    Chloride 102 04/17/2021 08:45 AM    CO2 23 04/17/2021 08:45 AM    Anion gap 7 01/10/2014 05:17 PM    Glucose 97 04/17/2021 08:45 AM    BUN 13 04/17/2021 08:45 AM    Creatinine 1.31 (H) 04/17/2021 08:45 AM    BUN/Creatinine ratio 10 04/17/2021 08:45 AM    GFR est AA 60 04/17/2021 08:45 AM    GFR est non-AA 52 (L) 04/17/2021 08:45 AM    Calcium 9.7 04/17/2021 08:45 AM    Bilirubin, total 0.3 04/17/2021 08:45 AM    Alk. phosphatase 65 04/17/2021 08:45 AM    Protein, total 7.1 04/17/2021 08:45 AM    Albumin 4.5 04/17/2021 08:45 AM    Globulin 3.5 01/10/2014 05:17 PM    A-G Ratio 1.7 04/17/2021 08:45 AM    ALT (SGPT) 14 04/17/2021 08:45 AM           Assessment:     Assessment:     Diagnoses and all orders for this visit:    1. Atherosclerosis of native coronary artery of native heart without angina pectoris    2. Essential hypertension, benign    3. Dyslipidemia    4. Postsurgical percutaneous transluminal coronary angioplasty status        ICD-10-CM ICD-9-CM    1. Atherosclerosis of native coronary artery of native heart without angina pectoris  I25.10 414.01    2. Essential hypertension, benign  I10 401.1    3. Dyslipidemia  E78.5 272.4    4. Postsurgical percutaneous transluminal coronary angioplasty status  Z98.61 V45.82      No orders of the defined types were placed in this encounter. Plan:     1.  Atherosclerotic heart disease: Remote history of PTCA stenting, reporting overall increased fatigue upcoming C-spine surgery we will proceed with Lexiscan nuclear stress test to evaluate for ischemia. Low-dose beta-blocker added for CAD guideline management. 2.  Hypertension: Stable  105/65 continue current medications most recent GFR 62  3.  Hyperlipidemia:  Statin intolerance, currently on Nexlatol/Zetia and welchol LDL 81, will be transitioning to Questran due to formulary coverage.   Repeat labs in 6 months patient reports he will have this done by primary care    78-year-old male history of atherosclerotic heart disease is preop for C-spine surgery. Proceed with Lexiscan nuclear stress test, low-dose beta-blocker added. Status of cardiac clearance dependent on stress test outcome. Follow-up in 6 months  Gloria Thibodeaux NP      Please note that this dictation was completed with [employer], the computer voice recognition software. Quite often unanticipated grammatical, syntax, homophones, and other interpretive errors are inadvertently transcribed by the computer software. Please disregard these errors. Please excuse any errors that have escaped final proofreading. Thank you.

## 2021-04-23 ENCOUNTER — OFFICE VISIT (OUTPATIENT)
Dept: CARDIOLOGY CLINIC | Age: 76
End: 2021-04-23
Payer: MEDICARE

## 2021-04-23 VITALS
SYSTOLIC BLOOD PRESSURE: 105 MMHG | HEIGHT: 66 IN | OXYGEN SATURATION: 99 % | RESPIRATION RATE: 16 BRPM | DIASTOLIC BLOOD PRESSURE: 65 MMHG | HEART RATE: 59 BPM | WEIGHT: 150 LBS | BODY MASS INDEX: 24.11 KG/M2

## 2021-04-23 DIAGNOSIS — E78.5 DYSLIPIDEMIA: ICD-10-CM

## 2021-04-23 DIAGNOSIS — Z98.61 POSTSURGICAL PERCUTANEOUS TRANSLUMINAL CORONARY ANGIOPLASTY STATUS: ICD-10-CM

## 2021-04-23 DIAGNOSIS — I10 ESSENTIAL HYPERTENSION, BENIGN: ICD-10-CM

## 2021-04-23 DIAGNOSIS — I25.10 ATHEROSCLEROSIS OF NATIVE CORONARY ARTERY OF NATIVE HEART WITHOUT ANGINA PECTORIS: Primary | ICD-10-CM

## 2021-04-23 PROCEDURE — G8754 DIAS BP LESS 90: HCPCS | Performed by: NURSE PRACTITIONER

## 2021-04-23 PROCEDURE — G8536 NO DOC ELDER MAL SCRN: HCPCS | Performed by: NURSE PRACTITIONER

## 2021-04-23 PROCEDURE — G8432 DEP SCR NOT DOC, RNG: HCPCS | Performed by: NURSE PRACTITIONER

## 2021-04-23 PROCEDURE — 93000 ELECTROCARDIOGRAM COMPLETE: CPT | Performed by: NURSE PRACTITIONER

## 2021-04-23 PROCEDURE — G8427 DOCREV CUR MEDS BY ELIG CLIN: HCPCS | Performed by: NURSE PRACTITIONER

## 2021-04-23 PROCEDURE — 1101F PT FALLS ASSESS-DOCD LE1/YR: CPT | Performed by: NURSE PRACTITIONER

## 2021-04-23 PROCEDURE — G8420 CALC BMI NORM PARAMETERS: HCPCS | Performed by: NURSE PRACTITIONER

## 2021-04-23 PROCEDURE — 99214 OFFICE O/P EST MOD 30 MIN: CPT | Performed by: NURSE PRACTITIONER

## 2021-04-23 PROCEDURE — G8752 SYS BP LESS 140: HCPCS | Performed by: NURSE PRACTITIONER

## 2021-04-23 RX ORDER — METOPROLOL SUCCINATE 25 MG/1
12.5 TABLET, EXTENDED RELEASE ORAL
Qty: 45 TAB | Refills: 1 | Status: SHIPPED | OUTPATIENT
Start: 2021-04-23

## 2021-04-23 NOTE — PROGRESS NOTES
Identified pt with two pt identifiers(name and ). Reviewed record in preparation for visit and have obtained necessary documentation. Chief Complaint   Patient presents with    Follow-up     6 month      Vitals:    21 1129   BP: 105/65   Pulse: (!) 59   Resp: 16   SpO2: 99%   Weight: 150 lb (68 kg)   Height: 5' 6\" (1.676 m)   PainSc:   0 - No pain       Health Maintenance Review: Patient reminded of \"due or due soon\" health maintenance. I have asked the patient to contact his/her primary care provider (PCP) for follow-up on his/her health maintenance. Coordination of Care Questionnaire:  :   1) Have you been to an emergency room, urgent care, or hospitalized since your last visit? If yes, where when, and reason for visit? no       2. Have seen or consulted any other health care provider since your last visit? If yes, where when, and reason for visit? NO      Patient is accompanied by self I have received verbal consent from Mary Faith. to discuss any/all medical information while they are present in the room.

## 2021-05-18 ENCOUNTER — TELEPHONE (OUTPATIENT)
Dept: CARDIOLOGY CLINIC | Age: 76
End: 2021-05-18

## 2021-05-21 NOTE — TELEPHONE ENCOUNTER
Placed call to pt. Two pt identifiers confirmed. Message from NP Gilbert Méndez relayed to pt. Pt states he will start taking metoprolol again.

## 2021-05-21 NOTE — TELEPHONE ENCOUNTER
Abhijit Olguin, NP  You 1 hour ago (1:47 PM)   MA  The purpose of the stress test was to clear him for surgery however if he is opting not to move forward with surgery would not need a stress test unless having chest pain or shortness of breath.  I had placed him on metoprolol to protect his heart given that he has established coronary artery disease.  This is a medication that I recommend that he would be on long-term because of his heart disease and not because we were getting a stress test.    Routing comment

## 2021-05-21 NOTE — TELEPHONE ENCOUNTER
Placed call to pt. Two pt identifiers confirmed. Pt states he does not need a refill of Metoprolol. Pt states he was told to take Metoprolol until he had procedure done.   Pt states he stopped taking meteprolol because he canceled procedure

## 2021-06-15 ENCOUNTER — TELEPHONE (OUTPATIENT)
Dept: CARDIOLOGY CLINIC | Age: 76
End: 2021-06-15

## 2021-06-15 RX ORDER — COLESEVELAM 180 1/1
1875 TABLET ORAL 2 TIMES DAILY
COMMUNITY
End: 2021-06-15 | Stop reason: SDUPTHER

## 2021-06-15 NOTE — TELEPHONE ENCOUNTER
I spoke with the patient. .  Verified patient with two patient identifiers. Discussed with jessica medications changes. Per Provider patient to continue on Welchol 625mg tab (3tabs 2x/day) and Zetia 10mg tab once a day. Patient to not start Questran. Patient verbalized understanding.

## 2021-06-15 NOTE — TELEPHONE ENCOUNTER
PCP: Twyla Villa MD    Last appt: 4/23/2021  Future Appointments   Date Time Provider Isabel sEtevez   10/25/2021 10:40 AM [facility]       Requested Prescriptions      No prescriptions requested or ordered in this encounter       Prior labs and Blood pressures:  BP Readings from Last 3 Encounters:   04/23/21 105/65   12/30/20 112/71   10/19/20 104/68     Lab Results   Component Value Date/Time    Sodium 141 04/17/2021 08:45 AM    Potassium 4.2 04/17/2021 08:45 AM    Chloride 102 04/17/2021 08:45 AM    CO2 23 04/17/2021 08:45 AM    Anion gap 7 01/10/2014 05:17 PM    Glucose 97 04/17/2021 08:45 AM    BUN 13 04/17/2021 08:45 AM    Creatinine 1.31 (H) 04/17/2021 08:45 AM    BUN/Creatinine ratio 10 04/17/2021 08:45 AM    GFR est AA 60 04/17/2021 08:45 AM    GFR est non-AA 52 (L) 04/17/2021 08:45 AM    Calcium 9.7 04/17/2021 08:45 AM     Lab Results   Component Value Date/Time    Hemoglobin A1c 5.0 09/29/2017 01:06 PM     Lab Results   Component Value Date/Time    Cholesterol, total 144 04/17/2021 08:45 AM    HDL Cholesterol 52 04/17/2021 08:45 AM    LDL, calculated 81 04/17/2021 08:45 AM    LDL, calculated 116 (H) 09/04/2019 08:39 AM    VLDL, calculated 11 04/17/2021 08:45 AM    VLDL, calculated 17 09/04/2019 08:39 AM    Triglyceride 48 04/17/2021 08:45 AM    CHOL/HDL Ratio 3.2 08/07/2013 04:24 AM     No results found for: Lukasz Brown, XQVID3, XQVID, VD3RIA    No results found for: TSH, TSH2, TSH3, TSHP, TSHEXT

## 2021-06-15 NOTE — TELEPHONE ENCOUNTER
Spoke with patient. Verified patient with two patient identifiers. Discussed note below with patient. Stated he's been taking 3 pills in the morning and 3 at night. Stated he has still been taking Welchol along with Zetia and Nexletol. Patient stated he currently has 3 pills left of Welchol and during phone call was at pharmacy to  Questran, but won't be available until Thursday. Said he remembered now that he was told to stop Welchol and switch to [employer]. Peg Palafox NP  You 47 minutes ago (3:02 PM)   MA  Please call him back. Susy Rodriguez had blood work done in April of this year. Dutch Gillis he tell us exactly what he was taking at the time, quantity of pills. Susy Rodriguez was on WelChol tablets and then had been switched due to an insurance coverage to [employer].  Was supposed to be also on Nexletol and Zetia.     Routing comment

## 2021-06-15 NOTE — TELEPHONE ENCOUNTER
Spoke with pharmacy regarding medications for clarification. Stated patient last picked up [facility] in April and is covered by insurance. Picked up Zetia in October for a 3mo supply with 3 refills and Nexletol was never picked up. Per Lexi Florian NP, to cancel Questran and Nexletol and keep patient on Welchol and Zetia. Will send scripts to pharmacy and update medication list.       Jermain Cason NP  You 10 minutes ago (4:08 PM)   MA  Please call [facility] to verify his cholesterol medications.  If he has been taking WelChol tablets and his insurance will pay for WelChol tablets we can keep him on WelChol 3 tablets twice a day, and Nexletol along with Zetia.  If they show that he has not been getting either Nexletol or Zetia filled please let me know.

## 2021-06-15 NOTE — TELEPHONE ENCOUNTER
Spoke with patient. Verified patient with two patient identifiers. Patient called stating pharmacy called regarding Colesevelam medication, stating they had no refills or approval for medication. Patient stated he was taking 6.25mg of the Colesevelam. Per last OV note, patient to switch from [facility] to [address] [place] [address] and to be currently taking Nexletol and Zetia. Patient stated he wasn't taking either of the 3 medications and wasn't sure what he was supposed to be taking. Stated new pharmacy is [facility] order, updated in chart at this time. Is patient to be on Questran, Zetia, and Nexletol? Pls advise. [facility].

## 2021-06-16 RX ORDER — EZETIMIBE 10 MG/1
TABLET ORAL
Qty: 90 TABLET | Refills: 3 | Status: SHIPPED | OUTPATIENT
Start: 2021-06-16 | End: 2021-11-11

## 2021-06-16 RX ORDER — COLESEVELAM 180 1/1
1875 TABLET ORAL 2 TIMES DAILY
Qty: 180 TABLET | Refills: 4 | Status: SHIPPED | OUTPATIENT
Start: 2021-06-16 | End: 2022-05-02

## 2021-10-14 DIAGNOSIS — I10 ESSENTIAL HYPERTENSION, BENIGN: Primary | ICD-10-CM

## 2021-10-14 RX ORDER — LISINOPRIL AND HYDROCHLOROTHIAZIDE 12.5; 2 MG/1; MG/1
1 TABLET ORAL DAILY
Qty: 90 TABLET | Refills: 3 | Status: SHIPPED | OUTPATIENT
Start: 2021-10-14 | End: 2022-03-31 | Stop reason: ALTCHOICE

## 2021-10-14 NOTE — TELEPHONE ENCOUNTER
Requested Prescriptions     Pending Prescriptions Disp Refills    lisinopril-hydroCHLOROthiazide (PRINZIDE, ZESTORETIC) 20-12.5 mg per tablet 90 Tablet 3     Sig: Take 1 Tablet by mouth daily. Patient confirmed Rx to be sent to [facility] via phone call today. Last seen: 04/23/2021  Future visit: 10/25/2021    Routing ASHLEY Potter

## 2021-10-18 ENCOUNTER — TELEPHONE (OUTPATIENT)
Dept: CARDIOLOGY CLINIC | Age: 76
End: 2021-10-18

## 2021-10-18 NOTE — TELEPHONE ENCOUNTER
I called and spoke with the patient. Patient's two identifiers verified. Patient explained that he got confused. Because initially, he was told by [facility] that they will be charging him $500.00 for his Colesevelam, that he cannot afford. Now, they will be sending him this medication at no cost.     Per patient, he no longer have issues about the medication, it has already been resolved.

## 2021-10-24 NOTE — PROGRESS NOTES
[facility] @ [address], [place]  Subjective/HPI:     Evelyn Aguilera. is a 64 y.o. male remote history of PTCA stenting for atherosclerotic heart disease, hypertension, hyperlipidemia/statin intolerance  is here for routine f/u. The patient denies chest pain/ shortness of breath, orthopnea, PND, LE edema, palpitations, syncope, presyncope or fatigue. Patient reports feeling well in his usual state of health. We discussed his lipid management, at last visit due to cost I was switching him from [facility] to [facility] however he is paying out-of-pocket at [address] at a substantially cheaper price than [employer] and has maintained [employer]. He was pending to see spine surgery however canceled and has done a series of injections which has alleviated his cervical discomfort. 4/23/21 Visit  1.  Atherosclerotic heart disease: Remote history of PTCA stenting, reporting overall increased fatigue upcoming C-spine surgery we will proceed with Lexiscan nuclear stress test to evaluate for ischemia. Low-dose beta-blocker added for CAD guideline management. 2.  Hypertension: Stable  105/65 continue current medications most recent GFR 62  3.  Hyperlipidemia:  Statin intolerance, currently on Nexlatol/Zetia and welchol LDL 81, will be transitioning to Questran due to formulary coverage.   Repeat labs in 6 months patient reports he will have this done by primary care  PCP Provider  Emily Dial MD  Past Medical History:   Diagnosis Date    Asthma     hx asthma - no attack in 40+ yrs    Back pain     CAD (coronary artery disease)     Chest pain, unspecified     Chest pain, unspecified     Chest pain, unspecified     Coronary atherosclerosis of native coronary artery 07/17/2012    stent    Coronary atherosclerosis of native coronary artery     Essential hypertension     Essential hypertension, benign     Lumbar spinal stenosis 08/03/2013    SPINE LUMBAR LAMINECTOMY WITH INSTRUMENTATION - p tamiko    Mixed hyperlipidemia     Other ill-defined conditions(799.89)     HIGH CHOLESTEROL    Other malaise and fatigue     Pure hypercholesterolemia     S/P colonoscopy 01/17/2018    md hanh tics      Past Surgical History:   Procedure Laterality Date    COLONOSCOPY N/A 1/17/2018    COL[facility] performed [facility] Leslie Kong MD at [facility] ENDOSCOPY    HX [address]  X2    HX GI      COLONOSCOPY    HX HEART CATHETERIZATION      STENT (DR LARSON)    HX LUMBAR LAMINECTOMY  8/2013    HX PTCA       Allergies   Allergen Reactions    Statins-Hmg-Coa Reductase Inhibitors Other (comments)     Body aches      Family History   Problem Relation Age of Onset    Cancer Mother         female   Ardyth Moritz Anesth Problems Neg Hx       Current Outpatient Medications   Medication Sig    lisinopril-hydroCHLOROthiazide (PRINZIDE, ZESTORETIC) 20-12.5 mg per tablet Take 1 Tablet by mouth daily.  colesevelam (WelChoL) 625 mg tablet Take 3 Tablets by mouth two (2) times a day. Take 3 tabs twice a day with meals.  ezetimibe (ZETIA) 10 mg tablet TAKE 1 TABLET BY MOUTH ONCE DAILY    metoprolol succinate (TOPROL-XL) 25 mg XL tablet Take 0.5 Tabs by mouth nightly.  bempedoic acid (Nexletol) 180 mg tab Take 1 Tab by mouth daily.  cholecalciferol, vitamin D3, (Vitamin D3) 50 mcg (2,000 unit) tab Take 2,000 Units by mouth.  ascorbic acid, vitamin C, (Vitamin C) 500 mg tablet Take  by mouth daily.  lansoprazole (PREVACID) 15 mg capsule Take  by mouth Daily (before breakfast).  DOCUSATE CALCIUM (STOOL SOFTENER PO) Take 100 mg by mouth as needed.  aspirin delayed-release 81 mg tablet Take 81 mg by mouth daily.  flaxseed 1,000 mg cap Take 1 Cap by mouth daily. No current facility-administered medications for this visit.       Vitals:    10/25/21 1046 10/25/21 1118   BP: 95/66 118/70   Pulse: 70    Resp: 18    SpO2: 99%    Weight: 144 lb (65.3 kg)    Height: 5' 6\" (1.676 m)      Social History     Socioeconomic History    Marital status:      Spouse name: Not on file    Number of children: Not on file    Years of education: Not on file    Highest education level: Not on file   Occupational History    Not on file   Tobacco Use    Smoking status: Never Smoker    Smokeless tobacco: Never Used   Vaping Use    Vaping Use: Never used   Substance and Sexual Activity    Alcohol use: No    Drug use: No    Sexual activity: Not Currently     Partners: Female   Other Topics Concern    Not on file   Social History Narrative    Medical History: BPHEDelevated PSACholesterolHypertensionMRI 2011 leftw bonnie convexity along the mid to low er lumbar spine    and a rightw bonnie convexity along the thoracolumbar junction. 2 the patient has multilevel degenerative disc disease. 3 there is moderate spinal    stenosis at L3-4 and L4-5 levels. In addition there is facet arthropathy and asymmetrical disc protrusions at L3-4 and L4-5 levels. 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 [id number] quiles md [id number] S1 laminectomy 2013    Hospitalization/Major Diagnostic Procedure: prolonged syncop'e 06    Family History: Mother: , cancerFather: , unknow nSister(s): aliveBrother(s): aliveDaughter(s): aliveGrandmother:    , natural causesGrandfather: , natural causes1 sister(s) - healthy. 3 son(s) , 3 daughter(s) . Social History: Alcohol Use Patient does not use alcohol. Smoking Status Patient is a never smoker. Caffeine: per day, coffee, soda, tea. Marital Status: . Lives w ith: alone. Children: yes, children.  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 Social Determinants of Health     Financial Resource Strain:     Difficulty of Paying Living Expenses:    Food Insecurity:     Worried About Running Out of Food in the Last Year:     [address] in the Last Year:    Transportation Needs:     Lack of Transportation (Medical):  Lack of Transportation (Non-Medical):    Physical Activity:     Days of Exercise per Week:     Minutes of Exercise per Session:    Stress:     Feeling of Stress :    Social Connections:     Frequency of Communication with Friends and Family:     Frequency of Social Gatherings with Friends and Family:     Attends Catholic Services:     Active Member of Clubs or Organizations:     Attends Club or Organization Meetings:     Marital Status:    Intimate Partner Violence:     Fear of Current or Ex-Partner:     Emotionally Abused:     Physically Abused:     Sexually Abused:        I have reviewed the nurses notes, vitals, problem list, allergy list, medical history, family, social history and medications. Review of Symptoms  11 systems reviewed, negative other than as stated in the HPI      Physical Exam:      General: Well developed, in no acute distress, cooperative and alert  HEENT: No carotid bruits, no JVD, trach is midline. Neck Supple, PERRL, EOM intact. Heart:  Normal S1/S2 negative S3 or S4. Regular, no murmur, gallop or rub. Respiratory: Clear bilaterally x 4, no wheezing or rales  Abdomen:   Soft, non-tender, no masses, bowel sounds are active. Extremities:  No edema, normal cap refill, no cyanosis, atraumatic. Neuro: A&Ox3, speech clear, gait stable. Skin: Skin color is normal. No rashes or lesions.  Non diaphoretic  Vascular: 2+ pulses symmetric in all extremities    Cardiographics    ECG        Cardiology Labs:  Lab Results   Component Value Date/Time    Cholesterol, total 144 04/17/2021 08:45 AM    HDL Cholesterol 52 04/17/2021 08:45 AM    LDL, calculated 81 04/17/2021 08:45 AM    LDL, calculated 116 (H) 09/04/2019 08:39 AM    Triglyceride 48 04/17/2021 08:45 AM    CHOL/HDL Ratio 3.2 08/07/2013 04:24 AM       Lab Results   Component Value Date/Time    Sodium 141 04/17/2021 08:45 AM    Potassium 4.2 04/17/2021 08:45 AM    Chloride 102 04/17/2021 08:45 AM    CO2 23 04/17/2021 08:45 AM    Anion gap 7 01/10/2014 05:17 PM    Glucose 97 04/17/2021 08:45 AM    BUN 13 04/17/2021 08:45 AM    Creatinine 1.31 (H) 04/17/2021 08:45 AM    BUN/Creatinine ratio 10 04/17/2021 08:45 AM    GFR est AA 60 04/17/2021 08:45 AM    GFR est non-AA 52 (L) 04/17/2021 08:45 AM    Calcium 9.7 04/17/2021 08:45 AM    Bilirubin, total 0.3 04/17/2021 08:45 AM    Alk. phosphatase 65 04/17/2021 08:45 AM    Protein, total 7.1 04/17/2021 08:45 AM    Albumin 4.5 04/17/2021 08:45 AM    Globulin 3.5 01/10/2014 05:17 PM    A-G Ratio 1.7 04/17/2021 08:45 AM    ALT (SGPT) 14 04/17/2021 08:45 AM           Assessment:     Assessment:     Diagnoses and all orders for this visit:    1. Atherosclerosis of native coronary artery of native heart without angina pectoris  -     METABOLIC PANEL, COMPREHENSIVE; Future  -     CBC WITH AUTOMATED DIFF; Future  -     LIPID PANEL; Future    2. Essential hypertension, benign  -     METABOLIC PANEL, COMPREHENSIVE; Future  -     CBC WITH AUTOMATED DIFF; Future  -     LIPID PANEL; Future    3. Dyslipidemia  -     METABOLIC PANEL, COMPREHENSIVE; Future  -     CBC WITH AUTOMATED DIFF; Future  -     LIPID PANEL; Future    4. Postsurgical percutaneous transluminal coronary angioplasty status  -     METABOLIC PANEL, COMPREHENSIVE; Future  -     CBC WITH AUTOMATED DIFF; Future  -     LIPID PANEL; Future        ICD-10-CM ICD-9-CM    1. Atherosclerosis of native coronary artery of native heart without angina pectoris  T28.55 816.26 METABOLIC PANEL, COMPREHENSIVE      CBC WITH AUTOMATED DIFF      LIPID PANEL   2.  Essential hypertension, benign  X67 161.9 METABOLIC PANEL, COMPREHENSIVE CBC WITH AUTOMATED DIFF      LIPID PANEL   3. Dyslipidemia  X71.2 464.7 METABOLIC PANEL, COMPREHENSIVE      CBC WITH AUTOMATED DIFF      LIPID PANEL   4. Postsurgical percutaneous transluminal coronary angioplasty status  A08.94 R83.11 METABOLIC PANEL, COMPREHENSIVE      CBC WITH AUTOMATED DIFF      LIPID PANEL     Orders Placed This Encounter    METABOLIC PANEL, COMPREHENSIVE     Standing Status:   Future     Standing Expiration Date:   4/25/2022    CBC WITH AUTOMATED DIFF     Standing Status:   Future     Standing Expiration Date:   4/25/2022    LIPID PANEL     Standing Status:   Future     Standing Expiration Date:   4/25/2022        Plan:     1.  Atherosclerotic heart disease: Remote history of PTCA stenting,  clinically asymptomatic. Continue aspirin and beta-blocker  2.  Hypertension: Repeat at end of visit 118/70 with regular size cuff. Continue current antihypertensive therapy  3.  Hyperlipidemia:  Statin intolerance, LDL 81 while  Nexlatol/Zetia and welchol LDL 81 4/21, has remained on current lipid management, will recheck labs prior to the end of the year. Stable from cardiac standpoint follow-up in 6 months. Paz Hou NP      Please note that this dictation was completed with [employer], the [employer] voice recognition software. Quite often unanticipated grammatical, syntax, homophones, and other interpretive errors are inadvertently transcribed by the computer software. Please disregard these errors. Please excuse any errors that have escaped final proofreading. Thank you.

## 2021-10-25 ENCOUNTER — OFFICE VISIT (OUTPATIENT)
Dept: CARDIOLOGY CLINIC | Age: 76
End: 2021-10-25
Payer: MEDICARE

## 2021-10-25 VITALS
HEART RATE: 70 BPM | BODY MASS INDEX: 23.14 KG/M2 | DIASTOLIC BLOOD PRESSURE: 70 MMHG | RESPIRATION RATE: 18 BRPM | HEIGHT: 66 IN | WEIGHT: 144 LBS | SYSTOLIC BLOOD PRESSURE: 118 MMHG | OXYGEN SATURATION: 99 %

## 2021-10-25 DIAGNOSIS — E78.5 DYSLIPIDEMIA: ICD-10-CM

## 2021-10-25 DIAGNOSIS — Z98.61 POSTSURGICAL PERCUTANEOUS TRANSLUMINAL CORONARY ANGIOPLASTY STATUS: ICD-10-CM

## 2021-10-25 DIAGNOSIS — I25.10 ATHEROSCLEROSIS OF NATIVE CORONARY ARTERY OF NATIVE HEART WITHOUT ANGINA PECTORIS: Primary | ICD-10-CM

## 2021-10-25 DIAGNOSIS — I10 ESSENTIAL HYPERTENSION, BENIGN: ICD-10-CM

## 2021-10-25 PROCEDURE — G8752 SYS BP LESS 140: HCPCS | Performed by: NURSE PRACTITIONER

## 2021-10-25 PROCEDURE — 1101F PT FALLS ASSESS-DOCD LE1/YR: CPT | Performed by: NURSE PRACTITIONER

## 2021-10-25 PROCEDURE — G8427 DOCREV CUR MEDS BY ELIG CLIN: HCPCS | Performed by: NURSE PRACTITIONER

## 2021-10-25 PROCEDURE — G8754 DIAS BP LESS 90: HCPCS | Performed by: NURSE PRACTITIONER

## 2021-10-25 PROCEDURE — G8420 CALC BMI NORM PARAMETERS: HCPCS | Performed by: NURSE PRACTITIONER

## 2021-10-25 PROCEDURE — 99214 OFFICE O/P EST MOD 30 MIN: CPT | Performed by: NURSE PRACTITIONER

## 2021-10-25 PROCEDURE — G8432 DEP SCR NOT DOC, RNG: HCPCS | Performed by: NURSE PRACTITIONER

## 2021-10-25 PROCEDURE — G8536 NO DOC ELDER MAL SCRN: HCPCS | Performed by: NURSE PRACTITIONER

## 2021-10-25 NOTE — PROGRESS NOTES
1. Have you been to the ER, urgent care clinic since your last visit? Hospitalized since your last visit? No    2. Have you seen or consulted any other health care providers outside of the [address] [place] [address] since your last visit? Include any pap smears or colon screening. No      Chief Complaint   Patient presents with    Follow-up     6 mo appt. Reported no cardiac symptoms today.

## 2021-10-27 ENCOUNTER — TELEPHONE (OUTPATIENT)
Dept: CARDIOLOGY CLINIC | Age: 76
End: 2021-10-27

## 2021-10-27 LAB
ALBUMIN SERPL-MCNC: 4.7 G/DL (ref 3.7–4.7)
ALBUMIN/GLOB SERPL: 2 {RATIO} (ref 1.2–2.2)
ALP SERPL-CCNC: 54 IU/L (ref 44–121)
ALT SERPL-CCNC: 15 IU/L (ref 0–44)
AST SERPL-CCNC: 20 IU/L (ref 0–40)
BASOPHILS # BLD AUTO: 0 X10E3/UL (ref 0–0.2)
BASOPHILS NFR BLD AUTO: 1 %
BILIRUB SERPL-MCNC: 0.7 MG/DL (ref 0–1.2)
BUN SERPL-MCNC: 9 MG/DL (ref 8–27)
BUN/CREAT SERPL: 8 (ref 10–24)
CALCIUM SERPL-MCNC: 9.9 MG/DL (ref 8.6–10.2)
CHLORIDE SERPL-SCNC: 104 MMOL/L (ref 96–106)
CHOLEST SERPL-MCNC: 134 MG/DL (ref 100–199)
CO2 SERPL-SCNC: 26 MMOL/L (ref 20–29)
CREAT SERPL-MCNC: 1.19 MG/DL (ref 0.76–1.27)
EOSINOPHIL # BLD AUTO: 0.3 X10E3/UL (ref 0–0.4)
EOSINOPHIL NFR BLD AUTO: 5 %
ERYTHROCYTE [DISTWIDTH] IN BLOOD BY AUTOMATED COUNT: 14.5 % (ref 11.6–15.4)
GLOBULIN SER CALC-MCNC: 2.3 G/DL (ref 1.5–4.5)
GLUCOSE SERPL-MCNC: 87 MG/DL (ref 65–99)
HCT VFR BLD AUTO: 42.5 % (ref 37.5–51)
HDLC SERPL-MCNC: 51 MG/DL
HGB BLD-MCNC: 14.4 G/DL (ref 13–17.7)
IMM GRANULOCYTES # BLD AUTO: 0 X10E3/UL (ref 0–0.1)
IMM GRANULOCYTES NFR BLD AUTO: 0 %
LDLC SERPL CALC-MCNC: 68 MG/DL (ref 0–99)
LYMPHOCYTES # BLD AUTO: 2.8 X10E3/UL (ref 0.7–3.1)
LYMPHOCYTES NFR BLD AUTO: 46 %
MCH RBC QN AUTO: 30.4 PG (ref 26.6–33)
MCHC RBC AUTO-ENTMCNC: 33.9 G/DL (ref 31.5–35.7)
MCV RBC AUTO: 90 FL (ref 79–97)
MONOCYTES # BLD AUTO: 0.3 X10E3/UL (ref 0.1–0.9)
MONOCYTES NFR BLD AUTO: 5 %
MORPHOLOGY BLD-IMP: NORMAL
NEUTROPHILS # BLD AUTO: 2.7 X10E3/UL (ref 1.4–7)
NEUTROPHILS NFR BLD AUTO: 43 %
PLATELET # BLD AUTO: 269 X10E3/UL (ref 150–450)
POTASSIUM SERPL-SCNC: 4.5 MMOL/L (ref 3.5–5.2)
PROT SERPL-MCNC: 7 G/DL (ref 6–8.5)
RBC # BLD AUTO: 4.73 X10E6/UL (ref 4.14–5.8)
SODIUM SERPL-SCNC: 142 MMOL/L (ref 134–144)
TRIGL SERPL-MCNC: 76 MG/DL (ref 0–149)
VLDLC SERPL CALC-MCNC: 15 MG/DL (ref 5–40)
WBC # BLD AUTO: 6.2 X10E3/UL (ref 3.4–10.8)

## 2021-10-27 NOTE — PROGRESS NOTES
Please call patient let him know lab work looks excellent, finally got his bad cholesterol below 70 which is ideal, continue current medications.

## 2021-10-27 NOTE — TELEPHONE ENCOUNTER
I called and spoke with the patient. Patient's two identifiers verified. I discussed this in detail with the patient:    ----- Message from Pal Canela NP sent at 10/27/2021  9:35 AM EDT -----  Please call patient let him know lab work looks excellent, finally got his bad cholesterol below 70 which is ideal, continue current medications.

## 2021-11-05 RX ORDER — ROSUVASTATIN CALCIUM 5 MG/1
TABLET, COATED ORAL
Qty: 90 TABLET | Refills: 3 | Status: SHIPPED | OUTPATIENT
Start: 2021-11-05

## 2021-11-11 RX ORDER — EZETIMIBE 10 MG/1
TABLET ORAL
Qty: 90 TABLET | Refills: 3 | Status: SHIPPED | OUTPATIENT
Start: 2021-11-11

## 2022-03-18 PROBLEM — Z98.890 S/P COLONOSCOPY: Status: ACTIVE | Noted: 2018-01-17

## 2022-03-19 PROBLEM — M54.40 CHRONIC LEFT-SIDED LOW BACK PAIN WITH SCIATICA: Status: ACTIVE | Noted: 2018-03-20

## 2022-03-19 PROBLEM — G89.29 CHRONIC LEFT-SIDED LOW BACK PAIN WITH SCIATICA: Status: ACTIVE | Noted: 2018-03-20

## 2022-03-30 NOTE — PROGRESS NOTES
[facility] @ [address], [place]  Subjective/HPI:     Byron Sierra is a 64 y.o. male remote history of PTCA stenting for atherosclerotic heart disease, hypertension, hyperlipidemia/statin intolerance  is here for routine f/u. The patient denies chest pain/ shortness of breath, orthopnea, PND, LE edema, palpitations, syncope, presyncope. Patient reports he has been having some intermittent episodes of lightheadedness with positional change. Discussed his weight loss, he had multiple dental extractions now has dentures with some sore spots he has been limiting his oral intake but improving. Consuming boost or Ensure, increasing his fish intake. Tolerating low-dose statin without arthralgias or malaise.       PCP Provider  Abel Cohen MD  Past Medical History:   Diagnosis Date    Asthma     hx asthma - no attack in 40+ yrs    Back pain     CAD (coronary artery disease)     Chest pain, unspecified     Chest pain, unspecified     Chest pain, unspecified     Coronary atherosclerosis of native coronary artery 07/17/2012    stent    Coronary atherosclerosis of native coronary artery     Essential hypertension     Essential hypertension, benign     Lumbar spinal stenosis 08/03/2013    SPINE LUMBAR LAMINECTOMY WITH INSTRUMENTATION - p tamiko    Mixed hyperlipidemia     Other ill-defined conditions(799.89)     HIGH CHOLESTEROL    Other malaise and fatigue     Pure hypercholesterolemia     S/P colonoscopy 01/17/2018    md hanh tics      Past Surgical History:   Procedure Laterality Date    COLONOSCOPY N/A 1/17/2018    [facility]COPY    HX BACK SURGERY      THORACIC SPINE SURGERY  X2    HX GI      COLONOSCOPY    HX HEART CATHETERIZATION      STENT (DR LARSON)    HX LUMBAR LAMINECTOMY  8/2013    HX PTCA       Allergies   Allergen Reactions    Statins-Hmg-Coa Reductase Inhibitors Other (comments)     Body aches      Family History   Problem Relation Age of Onset    Cancer Mother         female   Ann Marie Tim Anesth Problems Neg Hx       Current Outpatient Medications   Medication Sig    ezetimibe (ZETIA) 10 mg tablet TAKE 1 TABLET EVERY DAY    rosuvastatin (CRESTOR) 5 mg tablet TAKE 1 TABLET EVERY NIGHT    lisinopril-hydroCHLOROthiazide (PRINZIDE, ZESTORETIC) 20-12.5 mg per tablet Take 1 Tablet by mouth daily.  colesevelam (WelChoL) 625 mg tablet Take 3 Tablets by mouth two (2) times a day. Take 3 tabs twice a day with meals.  metoprolol succinate (TOPROL-XL) 25 mg XL tablet Take 0.5 Tabs by mouth nightly.  bempedoic acid (Nexletol) 180 mg tab Take 1 Tab by mouth daily.  cholecalciferol, vitamin D3, (Vitamin D3) 50 mcg (2,000 unit) tab Take 2,000 Units by mouth.  ascorbic acid, vitamin C, (Vitamin C) 500 mg tablet Take  by mouth daily.  lansoprazole (PREVACID) 15 mg capsule Take  by mouth Daily (before breakfast).  DOCUSATE CALCIUM (STOOL SOFTENER PO) Take 100 mg by mouth as needed.  aspirin delayed-release 81 mg tablet Take 81 mg by mouth daily.  flaxseed 1,000 mg cap Take 1 Cap by mouth daily. No current facility-administered medications for this visit. There were no vitals filed for this visit.   Social History     Socioeconomic History    Marital status:      Spouse name: Not on file    Number of children: Not on file    Years of education: Not on file    Highest education level: Not on file   Occupational History    Not on file   Tobacco Use    Smoking status: Never Smoker    Smokeless tobacco: Never Used   Vaping Use    Vaping Use: Never used   Substance and Sexual Activity    Alcohol use: No    Drug use: No    Sexual activity: Not Currently     Partners: Female   Other Topics Concern    Not on file   Social History Narrative    Medical History: BPHEDelevated PSACholesterolHypertensionMRI September 2, 2011 leftw bonnie convexity along the mid to low er lumbar spine    and a rightw bonnie convexity along the thoracolumbar junction. 2 the patient has multilevel degenerative disc disease. 3 there is moderate spinal    stenosis at L3-4 and L4-5 levels. In addition there is facet arthropathy and asymmetrical disc protrusions at L3-4 and L4-5 levels. 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 [id number] quiles md [id number] S1 laminectomy 2013    Hospitalization/Major Diagnostic Procedure: prolonged syncop'e 06    Family History: Mother: , cancerFather: , unknow nSister(s): aliveBrother(s): aliveDaughter(s): aliveGrandmother:    , natural causesGrandfather: , natural causes1 sister(s) - healthy. 3 son(s) , 3 daughter(s) . Social History: Alcohol Use Patient does not use alcohol. Smoking Status Patient is a never smoker. Caffeine: per day, coffee, soda, tea. Marital Status: . Lives w ith: alone. Children: yes, children. 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 Social Determinants of Health     Financial Resource Strain:     Difficulty of Paying Living Expenses: Not on file   Food Insecurity:     Worried About Running Out of Food in the Last Year: Not on file    [place] of Food in the Last Year: Not on file   Transportation Needs:     Lack of Transportation (Medical): Not on file    Lack of Transportation (Non-Medical):  Not on file   Physical Activity:     Days of Exercise per Week: Not on file    Minutes of Exercise per Session: Not on file   Stress:     Feeling of Stress : Not on file   Social Connections:     Frequency of Communication with Friends and Family: Not on file    Frequency of Social Gatherings with Friends and Family: Not on file    Attends Confucianist Services: Not on file    Active Member of Clubs or Organizations: Not on file    Attends Club or Organization Meetings: Not on file    Marital Status: Not on file   Intimate Partner Violence:     Fear of Current or Ex-Partner: Not on file    Emotionally Abused: Not on file    Physically Abused: Not on file    Sexually Abused: Not on file   Housing Stability:     Unable to Pay for Housing in the Last Year: Not on file    Number of [place] in the Last Year: Not on file    Unstable Housing in the Last Year: Not on file       I have reviewed the nurses notes, vitals, problem list, allergy list, medical history, family, social history and medications. Review of Symptoms  11 systems reviewed, negative other than as stated in the HPI      Physical Exam:      General: Well developed, in no acute distress, cooperative and alert  HEENT: No carotid bruits, no JVD, trach is midline. Neck Supple, PERRL, EOM intact. Heart:  Normal S1/S2 negative S3 or S4. Regular, no murmur, gallop or rub. Respiratory: Clear bilaterally x 4, no wheezing or rales  Abdomen:   Soft, non-tender, no masses, bowel sounds are active. Extremities:  No edema, normal cap refill, no cyanosis, atraumatic. Neuro: A&Ox3, speech clear, gait stable. Skin: Skin color is normal. No rashes or lesions.  Non diaphoretic  Vascular: 2+ pulses symmetric in all extremities    Cardiographics    ECG sinus rhythm        Cardiology Labs:  Lab Results   Component Value Date/Time    Cholesterol, total 134 10/26/2021 09:07 AM    HDL Cholesterol 51 10/26/2021 09:07 AM    LDL, calculated 68 10/26/2021 09:07 AM    LDL, calculated 116 (H) 09/04/2019 08:39 AM    Triglyceride 76 10/26/2021 09:07 AM    CHOL/HDL Ratio 3.2 08/07/2013 04:24 AM       Lab Results   Component Value Date/Time    Sodium 142 10/26/2021 09:07 AM    Potassium 4.5 10/26/2021 09:07 AM    Chloride 104 10/26/2021 09:07 AM    CO2 26 10/26/2021 09:07 AM    Anion gap 7 01/10/2014 05:17 PM    Glucose 87 10/26/2021 09:07 AM    BUN 9 10/26/2021 09:07 AM    Creatinine 1.19 10/26/2021 09:07 AM    BUN/Creatinine ratio 8 (L) 10/26/2021 09:07 AM    GFR est AA 67 10/26/2021 09:07 AM    GFR est non-AA 58 (L) 10/26/2021 09:07 AM    Calcium 9.9 10/26/2021 09:07 AM    Bilirubin, total 0.7 10/26/2021 09:07 AM    Alk. phosphatase 54 10/26/2021 09:07 AM    Protein, total 7.0 10/26/2021 09:07 AM    Albumin 4.7 10/26/2021 09:07 AM    Globulin 3.5 01/10/2014 05:17 PM    A-G Ratio 2.0 10/26/2021 09:07 AM    ALT (SGPT) 15 10/26/2021 09:07 AM           Assessment:     Assessment:     Diagnoses and all orders for this visit:    1. Atherosclerosis of native coronary artery of native heart without angina pectoris    2. Essential hypertension, benign    3. Dyslipidemia    4. S/P PTCA (percutaneous transluminal coronary angioplasty)        ICD-10-CM ICD-9-CM    1. Atherosclerosis of native coronary artery of native heart without angina pectoris  I25.10 414.01    2. Essential hypertension, benign  I10 401.1    3. Dyslipidemia  E78.5 272.4    4. S/P PTCA (percutaneous transluminal coronary angioplasty)  Z98.61 V45.82      No orders of the defined types were placed in this encounter. Plan:     1.  Atherosclerotic heart disease: Remote history of PTCA stenting,  clinically asymptomatic. Continue aspirin and beta-blocker. 2.  Hypertension: Mildly hypotensive, he has been losing weight due to oral/dental issues, discontinue lisinopril/hydrochlorothiazide, start lisinopril 10 mg daily. 3.  Hyperlipidemia: Tolerating low-dose Crestor 5 mg without arthralgias or malaise., LDL 68 (10/2021) on Nexlatol/Zetia/ low dose crestor 5mg  and welchol LDL  4. Weight loss: Secondary to dietary change from oral issues, dentures fitting with some discomfort.   Will have patient consume to [employer] protein drinks daily, increase caloric intake without excessive saturated fat. Stable from cardiac standpoint follow-up with me [place] office in 6 months, blood pressure check at [facility] [place] office 2 weeks. Scott Chisholm NP      Please note that this dictation was completed with [employer], the computer voice recognition software. Quite often unanticipated grammatical, syntax, homophones, and other interpretive errors are inadvertently transcribed by the computer software. Please disregard these errors. Please excuse any errors that have escaped final proofreading. Thank you.

## 2022-03-31 ENCOUNTER — OFFICE VISIT (OUTPATIENT)
Dept: CARDIOLOGY CLINIC | Age: 77
End: 2022-03-31
Payer: MEDICARE

## 2022-03-31 VITALS
SYSTOLIC BLOOD PRESSURE: 92 MMHG | HEIGHT: 66 IN | WEIGHT: 140 LBS | BODY MASS INDEX: 22.5 KG/M2 | RESPIRATION RATE: 18 BRPM | OXYGEN SATURATION: 99 % | DIASTOLIC BLOOD PRESSURE: 63 MMHG | HEART RATE: 72 BPM

## 2022-03-31 DIAGNOSIS — Z98.61 S/P PTCA (PERCUTANEOUS TRANSLUMINAL CORONARY ANGIOPLASTY): ICD-10-CM

## 2022-03-31 DIAGNOSIS — I10 ESSENTIAL HYPERTENSION, BENIGN: ICD-10-CM

## 2022-03-31 DIAGNOSIS — E78.5 DYSLIPIDEMIA: ICD-10-CM

## 2022-03-31 DIAGNOSIS — I25.10 ATHEROSCLEROSIS OF NATIVE CORONARY ARTERY OF NATIVE HEART WITHOUT ANGINA PECTORIS: Primary | ICD-10-CM

## 2022-03-31 PROCEDURE — 93000 ELECTROCARDIOGRAM COMPLETE: CPT | Performed by: NURSE PRACTITIONER

## 2022-03-31 PROCEDURE — G8427 DOCREV CUR MEDS BY ELIG CLIN: HCPCS | Performed by: NURSE PRACTITIONER

## 2022-03-31 PROCEDURE — 1101F PT FALLS ASSESS-DOCD LE1/YR: CPT | Performed by: NURSE PRACTITIONER

## 2022-03-31 PROCEDURE — G8420 CALC BMI NORM PARAMETERS: HCPCS | Performed by: NURSE PRACTITIONER

## 2022-03-31 PROCEDURE — G8536 NO DOC ELDER MAL SCRN: HCPCS | Performed by: NURSE PRACTITIONER

## 2022-03-31 PROCEDURE — G8754 DIAS BP LESS 90: HCPCS | Performed by: NURSE PRACTITIONER

## 2022-03-31 PROCEDURE — G8752 SYS BP LESS 140: HCPCS | Performed by: NURSE PRACTITIONER

## 2022-03-31 PROCEDURE — G8432 DEP SCR NOT DOC, RNG: HCPCS | Performed by: NURSE PRACTITIONER

## 2022-03-31 PROCEDURE — 99214 OFFICE O/P EST MOD 30 MIN: CPT | Performed by: NURSE PRACTITIONER

## 2022-03-31 RX ORDER — LISINOPRIL 10 MG/1
10 TABLET ORAL DAILY
Qty: 30 TABLET | Refills: 3 | Status: SHIPPED | OUTPATIENT
Start: 2022-03-31

## 2022-03-31 NOTE — PROGRESS NOTES
Chief Complaint   Patient presents with    Coronary Artery Disease     6 months follow up- Denies cardiax sx. 1. Have you been to the ER, urgent care clinic since your last visit? Hospitalized since your last visit? No    2. Have you seen or consulted any other health care providers outside of the [address] [place] [address] since your last visit?   No

## 2022-04-28 DIAGNOSIS — R53.83 FATIGUE, UNSPECIFIED TYPE: ICD-10-CM

## 2022-04-28 DIAGNOSIS — E78.00 HIGH CHOLESTEROL: Primary | ICD-10-CM

## 2022-05-03 ENCOUNTER — TELEPHONE (OUTPATIENT)
Dept: CARDIOLOGY CLINIC | Age: 77
End: 2022-05-03

## 2022-05-03 LAB
ALBUMIN SERPL-MCNC: 4.4 G/DL (ref 3.7–4.7)
ALBUMIN/GLOB SERPL: 2.2 {RATIO} (ref 1.2–2.2)
ALP SERPL-CCNC: 44 IU/L (ref 44–121)
ALT SERPL-CCNC: 20 IU/L (ref 0–44)
AST SERPL-CCNC: 22 IU/L (ref 0–40)
BASOPHILS # BLD AUTO: 0 X10E3/UL (ref 0–0.2)
BASOPHILS NFR BLD AUTO: 1 %
BILIRUB SERPL-MCNC: 0.4 MG/DL (ref 0–1.2)
BUN SERPL-MCNC: 11 MG/DL (ref 8–27)
BUN/CREAT SERPL: 10 (ref 10–24)
CALCIUM SERPL-MCNC: 9 MG/DL (ref 8.6–10.2)
CHLORIDE SERPL-SCNC: 107 MMOL/L (ref 96–106)
CHOLEST SERPL-MCNC: 136 MG/DL (ref 100–199)
CO2 SERPL-SCNC: 25 MMOL/L (ref 20–29)
CREAT SERPL-MCNC: 1.05 MG/DL (ref 0.76–1.27)
EGFR: 73 ML/MIN/1.73
EOSINOPHIL # BLD AUTO: 0.4 X10E3/UL (ref 0–0.4)
EOSINOPHIL NFR BLD AUTO: 9 %
ERYTHROCYTE [DISTWIDTH] IN BLOOD BY AUTOMATED COUNT: 13.2 % (ref 11.6–15.4)
GLOBULIN SER CALC-MCNC: 2 G/DL (ref 1.5–4.5)
GLUCOSE SERPL-MCNC: 94 MG/DL (ref 65–99)
HCT VFR BLD AUTO: 36.8 % (ref 37.5–51)
HDLC SERPL-MCNC: 62 MG/DL
HGB BLD-MCNC: 12.5 G/DL (ref 13–17.7)
IMM GRANULOCYTES # BLD AUTO: 0 X10E3/UL (ref 0–0.1)
IMM GRANULOCYTES NFR BLD AUTO: 0 %
IMP & REVIEW OF LAB RESULTS: NORMAL
LDLC SERPL CALC-MCNC: 63 MG/DL (ref 0–99)
LYMPHOCYTES # BLD AUTO: 2 X10E3/UL (ref 0.7–3.1)
LYMPHOCYTES NFR BLD AUTO: 46 %
MCH RBC QN AUTO: 31.3 PG (ref 26.6–33)
MCHC RBC AUTO-ENTMCNC: 34 G/DL (ref 31.5–35.7)
MCV RBC AUTO: 92 FL (ref 79–97)
MONOCYTES # BLD AUTO: 0.3 X10E3/UL (ref 0.1–0.9)
MONOCYTES NFR BLD AUTO: 6 %
NEUTROPHILS # BLD AUTO: 1.6 X10E3/UL (ref 1.4–7)
NEUTROPHILS NFR BLD AUTO: 38 %
PLATELET # BLD AUTO: 239 X10E3/UL (ref 150–450)
POTASSIUM SERPL-SCNC: 4.3 MMOL/L (ref 3.5–5.2)
PROT SERPL-MCNC: 6.4 G/DL (ref 6–8.5)
RBC # BLD AUTO: 4 X10E6/UL (ref 4.14–5.8)
SODIUM SERPL-SCNC: 143 MMOL/L (ref 134–144)
TRIGL SERPL-MCNC: 45 MG/DL (ref 0–149)
VLDLC SERPL CALC-MCNC: 11 MG/DL (ref 5–40)
WBC # BLD AUTO: 4.3 X10E3/UL (ref 3.4–10.8)

## 2022-05-03 NOTE — TELEPHONE ENCOUNTER
I called and spoke with the patient, two identifiers verified. Patient is notified about this:  ----- Message from Emre Caicedo NP sent at 5/3/2022 11:49 AM EDT -----  Please call patient labs look good, continue current medications.

## 2022-09-22 ENCOUNTER — TRANSCRIBE ORDER (OUTPATIENT)
Dept: SCHEDULING | Age: 77
End: 2022-09-22

## 2022-09-22 DIAGNOSIS — M47.9 SPONDYLOSIS, UNSPECIFIED: Primary | ICD-10-CM

## 2022-09-23 ENCOUNTER — TRANSCRIBE ORDER (OUTPATIENT)
Dept: SCHEDULING | Age: 77
End: 2022-09-23

## 2022-09-23 DIAGNOSIS — M47.9 SPONDYLOSIS, UNSPECIFIED: Primary | ICD-10-CM

## 2022-10-02 ENCOUNTER — HOSPITAL ENCOUNTER (OUTPATIENT)
Dept: MRI IMAGING | Age: 77
Discharge: HOME | End: 2022-10-02
Attending: PHYSICIAN ASSISTANT
Payer: MEDICARE

## 2022-10-02 DIAGNOSIS — M47.9 SPONDYLOSIS, UNSPECIFIED: ICD-10-CM

## 2022-10-02 PROCEDURE — 74011250636 HC RX REV CODE- 250/636

## 2022-10-02 PROCEDURE — A9576 INJ PROHANCE MULTIPACK: HCPCS

## 2022-10-02 PROCEDURE — 72158 MRI LUMBAR SPINE W/O & W/DYE: CPT

## 2022-10-02 RX ORDER — SODIUM CHLORIDE 0.9 % (FLUSH) 0.9 %
10 SYRINGE (ML) INJECTION
Status: DISCONTINUED | OUTPATIENT
Start: 2022-10-02 | End: 2022-10-03 | Stop reason: HOSPADM

## 2022-10-02 RX ADMIN — GADOTERIDOL 13 ML: 279.3 INJECTION, SOLUTION INTRAVENOUS at 15:59

## 2023-04-21 DIAGNOSIS — M47.9 SPONDYLOSIS, UNSPECIFIED: Primary | ICD-10-CM

## 2023-04-23 RX ORDER — COLESEVELAM 180 1/1
TABLET ORAL
Qty: 180 TABLET | Refills: 0 | OUTPATIENT
Start: 2023-04-23

## 2024-05-25 ENCOUNTER — HOSPITAL ENCOUNTER (EMERGENCY)
Facility: HOSPITAL | Age: 79
Discharge: HOME | End: 2024-05-25
Attending: EMERGENCY MEDICINE

## 2024-05-25 ENCOUNTER — APPOINTMENT (OUTPATIENT)
Facility: HOSPITAL | Age: 79
End: 2024-05-25

## 2024-05-25 VITALS
BODY MASS INDEX: 21.97 KG/M2 | HEIGHT: 66 IN | RESPIRATION RATE: 18 BRPM | SYSTOLIC BLOOD PRESSURE: 152 MMHG | HEART RATE: 59 BPM | TEMPERATURE: 98 F | OXYGEN SATURATION: 96 % | DIASTOLIC BLOOD PRESSURE: 71 MMHG | WEIGHT: 136.69 LBS

## 2024-05-25 DIAGNOSIS — R10.13 ABDOMINAL PAIN, EPIGASTRIC: Primary | ICD-10-CM

## 2024-05-25 LAB
ALBUMIN SERPL-MCNC: 4 G/DL (ref 3.5–5)
ALBUMIN/GLOB SERPL: 1 (ref 1.1–2.2)
ALP SERPL-CCNC: 53 U/L (ref 45–117)
ALT SERPL-CCNC: 37 U/L (ref 12–78)
ANION GAP SERPL CALC-SCNC: 4 MMOL/L (ref 5–15)
AST SERPL-CCNC: 23 U/L (ref 15–37)
BASOPHILS # BLD: 0 K/UL (ref 0–0.1)
BASOPHILS NFR BLD: 0 % (ref 0–1)
BILIRUB SERPL-MCNC: 1.1 MG/DL (ref 0.2–1)
BUN SERPL-MCNC: 10 MG/DL (ref 6–20)
BUN/CREAT SERPL: 8 (ref 12–20)
CALCIUM SERPL-MCNC: 9.8 MG/DL (ref 8.5–10.1)
CHLORIDE SERPL-SCNC: 109 MMOL/L (ref 97–108)
CO2 SERPL-SCNC: 27 MMOL/L (ref 21–32)
COMMENT:: NORMAL
CREAT SERPL-MCNC: 1.2 MG/DL (ref 0.7–1.3)
DIFFERENTIAL METHOD BLD: ABNORMAL
EOSINOPHIL # BLD: 0.1 K/UL (ref 0–0.4)
EOSINOPHIL NFR BLD: 1 % (ref 0–7)
ERYTHROCYTE [DISTWIDTH] IN BLOOD BY AUTOMATED COUNT: 13.8 % (ref 11.5–14.5)
GLOBULIN SER CALC-MCNC: 4 G/DL (ref 2–4)
GLUCOSE SERPL-MCNC: 103 MG/DL (ref 65–100)
HCT VFR BLD AUTO: 40.1 % (ref 36.6–50.3)
HGB BLD-MCNC: 13.9 G/DL (ref 12.1–17)
IMM GRANULOCYTES # BLD AUTO: 0 K/UL (ref 0–0.04)
IMM GRANULOCYTES NFR BLD AUTO: 1 % (ref 0–0.5)
LIPASE SERPL-CCNC: 26 U/L (ref 13–75)
LYMPHOCYTES # BLD: 2.5 K/UL (ref 0.8–3.5)
LYMPHOCYTES NFR BLD: 40 % (ref 12–49)
MCH RBC QN AUTO: 31.2 PG (ref 26–34)
MCHC RBC AUTO-ENTMCNC: 34.7 G/DL (ref 30–36.5)
MCV RBC AUTO: 89.9 FL (ref 80–99)
MONOCYTES # BLD: 0.4 K/UL (ref 0–1)
MONOCYTES NFR BLD: 6 % (ref 5–13)
NEUTS SEG # BLD: 3.3 K/UL (ref 1.8–8)
NEUTS SEG NFR BLD: 52 % (ref 32–75)
NRBC # BLD: 0 K/UL (ref 0–0.01)
NRBC BLD-RTO: 0 PER 100 WBC
PLATELET # BLD AUTO: 241 K/UL (ref 150–400)
PMV BLD AUTO: 10.1 FL (ref 8.9–12.9)
POTASSIUM SERPL-SCNC: 4.6 MMOL/L (ref 3.5–5.1)
PROT SERPL-MCNC: 8 G/DL (ref 6.4–8.2)
RBC # BLD AUTO: 4.46 M/UL (ref 4.1–5.7)
SODIUM SERPL-SCNC: 140 MMOL/L (ref 136–145)
SPECIMEN HOLD: NORMAL
TROPONIN I SERPL HS-MCNC: 6 NG/L (ref 0–76)
WBC # BLD AUTO: 6.4 K/UL (ref 4.1–11.1)

## 2024-05-25 PROCEDURE — 99285 EMERGENCY DEPT VISIT HI MDM: CPT

## 2024-05-25 PROCEDURE — 6360000004 HC RX CONTRAST MEDICATION: Performed by: RADIOLOGY

## 2024-05-25 PROCEDURE — 6360000002 HC RX W HCPCS: Performed by: EMERGENCY MEDICINE

## 2024-05-25 PROCEDURE — 85025 COMPLETE CBC W/AUTO DIFF WBC: CPT

## 2024-05-25 PROCEDURE — 80053 COMPREHEN METABOLIC PANEL: CPT

## 2024-05-25 PROCEDURE — 6370000000 HC RX 637 (ALT 250 FOR IP): Performed by: EMERGENCY MEDICINE

## 2024-05-25 PROCEDURE — 83690 ASSAY OF LIPASE: CPT

## 2024-05-25 PROCEDURE — 74177 CT ABD & PELVIS W/CONTRAST: CPT

## 2024-05-25 PROCEDURE — 84484 ASSAY OF TROPONIN QUANT: CPT

## 2024-05-25 PROCEDURE — 36415 COLL VENOUS BLD VENIPUNCTURE: CPT

## 2024-05-25 PROCEDURE — 96374 THER/PROPH/DIAG INJ IV PUSH: CPT

## 2024-05-25 PROCEDURE — 2580000003 HC RX 258

## 2024-05-25 RX ORDER — ONDANSETRON 2 MG/ML
4 INJECTION INTRAMUSCULAR; INTRAVENOUS
Status: COMPLETED | OUTPATIENT
Start: 2024-05-25 | End: 2024-05-25

## 2024-05-25 RX ORDER — FAMOTIDINE 20 MG/1
20 TABLET, FILM COATED ORAL
Status: COMPLETED | OUTPATIENT
Start: 2024-05-25 | End: 2024-05-25

## 2024-05-25 RX ORDER — 0.9 % SODIUM CHLORIDE 0.9 %
1000 INTRAVENOUS SOLUTION INTRAVENOUS ONCE
Status: COMPLETED | OUTPATIENT
Start: 2024-05-25 | End: 2024-05-25

## 2024-05-25 RX ADMIN — ONDANSETRON 4 MG: 2 INJECTION INTRAMUSCULAR; INTRAVENOUS at 21:56

## 2024-05-25 RX ADMIN — SODIUM CHLORIDE 1000 ML: 9 INJECTION, SOLUTION INTRAVENOUS at 21:43

## 2024-05-25 RX ADMIN — FAMOTIDINE 20 MG: 20 TABLET, FILM COATED ORAL at 21:56

## 2024-05-25 RX ADMIN — IOPAMIDOL 100 ML: 755 INJECTION, SOLUTION INTRAVENOUS at 21:00

## 2024-05-25 RX ADMIN — ALUMINUM HYDROXIDE, MAGNESIUM HYDROXIDE, AND SIMETHICONE 40 ML: 1200; 120; 1200 SUSPENSION ORAL at 22:08

## 2024-05-25 ASSESSMENT — PAIN DESCRIPTION - ONSET: ONSET: PROGRESSIVE

## 2024-05-25 ASSESSMENT — PAIN DESCRIPTION - ORIENTATION: ORIENTATION: MID

## 2024-05-25 ASSESSMENT — LIFESTYLE VARIABLES
HOW MANY STANDARD DRINKS CONTAINING ALCOHOL DO YOU HAVE ON A TYPICAL DAY: PATIENT DOES NOT DRINK
HOW OFTEN DO YOU HAVE A DRINK CONTAINING ALCOHOL: NEVER

## 2024-05-25 ASSESSMENT — PAIN - FUNCTIONAL ASSESSMENT: PAIN_FUNCTIONAL_ASSESSMENT: PREVENTS OR INTERFERES SOME ACTIVE ACTIVITIES AND ADLS

## 2024-05-25 ASSESSMENT — PAIN DESCRIPTION - LOCATION: LOCATION: ABDOMEN

## 2024-05-25 ASSESSMENT — PAIN DESCRIPTION - FREQUENCY: FREQUENCY: CONTINUOUS

## 2024-05-25 ASSESSMENT — PAIN SCALES - GENERAL: PAINLEVEL_OUTOF10: 8

## 2024-05-25 ASSESSMENT — PAIN DESCRIPTION - PAIN TYPE: TYPE: ACUTE PAIN

## 2024-05-25 ASSESSMENT — PAIN DESCRIPTION - DESCRIPTORS: DESCRIPTORS: ACHING;CRAMPING

## 2024-05-25 NOTE — ED NOTES
77 yo M, periumbilical pain woke him up at 2am this morning with 6 back to back episodes of vomiting.  Pain and nausea enduring.  Periumbilical tenderness to palpation.      7:19 PM  I have evaluated the patient as the Provider in Rapid Medical Evaluation (RME). I have reviewed his vital signs and the triage nurse assessment. I have talked with the patient and any available family and advised that I am the provider in triage and have ordered the appropriate study to initiate their work up based on the clinical presentation during my assessment. I have advised that the patient will be accommodated in the Main ED as soon as possible. I have also requested to contact the triage nurse or myself immediately if the patient experiences any changes in their condition during this brief waiting period.  AISLINN Abbasi, Mario RHOADES PA-C  05/25/24 1921

## 2024-05-25 NOTE — ED TRIAGE NOTES
Pt arrived ambulatory to the ER with CC mid abdominal pain since 0200 with N/V. +tenderness. Took mylanta and pepto bismol but it has not helped.

## 2024-05-26 LAB
EKG ATRIAL RATE: 61 BPM
EKG DIAGNOSIS: NORMAL
EKG P AXIS: 64 DEGREES
EKG P-R INTERVAL: 196 MS
EKG Q-T INTERVAL: 422 MS
EKG QRS DURATION: 138 MS
EKG QTC CALCULATION (BAZETT): 424 MS
EKG R AXIS: 26 DEGREES
EKG T AXIS: 44 DEGREES
EKG VENTRICULAR RATE: 61 BPM

## (undated) DEVICE — SYR 50ML SLIP TIP NSAF LF STRL --

## (undated) DEVICE — SYRINGE MED 20ML STD CLR PLAS LUERLOCK TIP N CTRL DISP

## (undated) DEVICE — CONNECTOR TBNG AUX H2O JET DISP FOR OLY 160/180 SER

## (undated) DEVICE — NEEDLE HYPO 18GA L1.5IN PNK S STL HUB POLYPR SHLD REG BVL

## (undated) DEVICE — CUFF BLD PRSS CHILD SZ 9 FOR 15-21CM LIMB VYN SFT W/O TB

## (undated) DEVICE — 1200 GUARD II KIT W/5MM TUBE W/O VAC TUBE: Brand: GUARDIAN

## (undated) DEVICE — SET ADMIN 16ML TBNG L100IN 2 Y INJ SITE IV PIGGY BK DISP

## (undated) DEVICE — KENDALL RADIOLUCENT FOAM MONITORING ELECTRODE -RECTANGULAR SHAPE: Brand: KENDALL

## (undated) DEVICE — CATH IV AUTOGRD BC BLU 22GA 25 -- INSYTE

## (undated) DEVICE — BAG SPEC BIOHZD LF 2MIL 6X10IN -- CONVERT TO ITEM 357326

## (undated) DEVICE — NEONATAL-ADULT SPO2 SENSOR: Brand: NELLCOR

## (undated) DEVICE — AIRLIFE™ U/CONNECT-IT OXYGEN TUBING 7 FEET (2.1 M) CRUSH-RESISTANT OXYGEN TUBING, VINYL TIPPED: Brand: AIRLIFE™

## (undated) DEVICE — FORCEPS BX L240CM JAW DIA2.8MM L CAP W/ NDL MIC MESH TOOTH

## (undated) DEVICE — BITE BLK ENDOSCP AD 54FR GRN POLYETH ENDOSCP W STRP SLD

## (undated) DEVICE — BW-412T DISP COMBO CLEANING BRUSH: Brand: SINGLE USE COMBINATION CLEANING BRUSH

## (undated) DEVICE — KIT IV STRT W CHLORAPREP PD 1ML

## (undated) DEVICE — Z DISCONTINUED NO SUB IDED SET EXTN W/ 4 W STPCOCK M SPIN LOK 36IN

## (undated) DEVICE — SOLIDIFIER FLUID 3000 CC ABSORB

## (undated) DEVICE — CANN NASAL O2 CAPNOGRAPHY AD -- FILTERLINE

## (undated) DEVICE — Z DISCONTINUED USE 2751540 TUBING IRRIG L10IN DISP PMP ENDOGATOR

## (undated) DEVICE — BAG BELONG PT PERS CLEAR HANDL

## (undated) DEVICE — CONTAINER SPEC 20 ML LID NEUT BUFF FORMALIN 10 % POLYPR STS

## (undated) DEVICE — QUILTED PREMIUM COMFORT UNDERPAD,EXTRA HEAVY: Brand: WINGS

## (undated) DEVICE — ENDO CARRY-ON PROCEDURE KIT INCLUDES ENZYMATIC SPONGE, GAUZE, BIOHAZARD LABEL, TRAY, LUBRICANT, DIRTY SCOPE LABEL, WATER LABEL, TRAY, DRAWSTRING PAD, AND DEFENDO 4-PIECE KIT.: Brand: ENDO CARRY-ON PROCEDURE KIT